# Patient Record
Sex: FEMALE | Race: WHITE | Employment: UNEMPLOYED | URBAN - METROPOLITAN AREA
[De-identification: names, ages, dates, MRNs, and addresses within clinical notes are randomized per-mention and may not be internally consistent; named-entity substitution may affect disease eponyms.]

---

## 2018-03-22 ENCOUNTER — HOSPITAL ENCOUNTER (OUTPATIENT)
Age: 53
Discharge: HOME | End: 2018-03-24
Payer: COMMERCIAL

## 2018-03-22 PROCEDURE — 87075 CULTR BACTERIA EXCEPT BLOOD: CPT

## 2018-03-22 PROCEDURE — 87070 CULTURE OTHR SPECIMN AEROBIC: CPT

## 2018-03-24 LAB
ANAEROBIC CULTURE: NORMAL
ORGANISM: ABNORMAL
WOUND/ABSCESS: ABNORMAL
WOUND/ABSCESS: ABNORMAL

## 2021-04-09 ENCOUNTER — INITIAL CONSULT (OUTPATIENT)
Dept: SURGERY | Age: 56
End: 2021-04-09
Payer: COMMERCIAL

## 2021-04-09 VITALS
TEMPERATURE: 97.9 F | HEART RATE: 57 BPM | WEIGHT: 214 LBS | BODY MASS INDEX: 42.01 KG/M2 | DIASTOLIC BLOOD PRESSURE: 76 MMHG | HEIGHT: 60 IN | SYSTOLIC BLOOD PRESSURE: 137 MMHG

## 2021-04-09 DIAGNOSIS — K59.09 OTHER CONSTIPATION: ICD-10-CM

## 2021-04-09 PROCEDURE — G8419 CALC BMI OUT NRM PARAM NOF/U: HCPCS | Performed by: SURGERY

## 2021-04-09 PROCEDURE — 99244 OFF/OP CNSLTJ NEW/EST MOD 40: CPT | Performed by: SURGERY

## 2021-04-09 PROCEDURE — G8427 DOCREV CUR MEDS BY ELIG CLIN: HCPCS | Performed by: SURGERY

## 2021-04-09 RX ORDER — LISINOPRIL 10 MG/1
TABLET ORAL
COMMUNITY
Start: 2021-04-07

## 2021-04-09 RX ORDER — ATORVASTATIN CALCIUM 40 MG/1
TABLET, FILM COATED ORAL
COMMUNITY
Start: 2021-03-12

## 2021-04-09 RX ORDER — FAMOTIDINE 20 MG/1
TABLET, FILM COATED ORAL
COMMUNITY
Start: 2021-04-06

## 2021-04-09 RX ORDER — OMEPRAZOLE 20 MG/1
CAPSULE, DELAYED RELEASE ORAL
COMMUNITY
Start: 2021-03-23

## 2021-04-09 RX ORDER — SODIUM CHLORIDE 9 MG/ML
INJECTION, SOLUTION INTRAVENOUS CONTINUOUS
Status: CANCELLED | OUTPATIENT
Start: 2021-04-09

## 2021-04-09 RX ORDER — MOMETASONE FUROATE AND FORMOTEROL FUMARATE DIHYDRATE 200; 5 UG/1; UG/1
AEROSOL RESPIRATORY (INHALATION)
COMMUNITY
Start: 2021-03-21

## 2021-04-09 NOTE — PROGRESS NOTES
Ben Gee  4/9/2021      [place] Office Consult    CHIEF COMPLAINT:  Abd pain, nausea    HISTORY OF PRESENT ILLNESS:  Ben Gee is a 63 y.o.  female had a ventral hernia repair by Dr. Darell Siu in 2015, CT scan 2017 for abdominal pain showed the hernia was well repaired, no recurrence, mesh in good position. She says she has chronic constipation pains, worse after eating. Says she throws up frequently, usually after taking pills. Takes Omeprazole daily for gastritis and acid reflux. Past Medical History: She has a past medical history of Arthritis, Carotid stenosis, left, COPD (chronic obstructive pulmonary disease) ([facility] H/O: pneumonia, Hyperlipidemia, Hypertension, Lupus ([facility] [address] Osteoporosis, PONV (postoperative nausea and vomiting), and Sleep apnea. Past Surgical History: She has a past surgical history that includes Hysterectomy; Incisional hernia repair; joint replacement; Tonsillectomy; Cholecystectomy; Colonoscopy; Endoscopy, colon, diagnostic; Cardiac surgery (2009); and hernia repair (2015). Home Medications  Prior to Visit Medications    Medication Sig Taking?  Authorizing Provider   atorvastatin (LIPITOR) 40 MG tablet  Yes Historical Provider, MD   diclofenac sodium (VOLTAREN) 1 % GEL USE AS DIRECTED THREE TIMES A DAY--TRANSDERMAL FOR 14 DAYS Yes Historical Provider, MD   famotidine (PEPCID) 20 MG tablet  Yes Historical Provider, MD   lisinopril (PRINIVIL;ZESTRIL) 10 MG tablet  Yes Historical Provider, MD Sayra Corcoran 200-5 MCG/ACT inhaler  Yes Historical Provider, MD   omeprazole (PRILOSEC) 20 MG delayed release capsule take 1 capsule by mouth once daily Yes Historical Provider, MD   pantoprazole (PROTONIX) 40 MG tablet Take 1 tablet by mouth daily Yes Zara Martinez,    vitamin D (ERGOCALCIFEROL) 72336 UNITS CAPS capsule Take 50,000 Units by mouth once a week Yes Historical Provider, MD   docusate sodium (COLACE) 100 MG capsule Take 100 mg by mouth 2 times daily Yes Historical Constipation, abdominal pain. Had a colonoscopy a few years ago. PLAN:   Fiber gummy 6 per day to prevent constipation pains. Colonoscopy    Signed: Dr. Daniela Sylvester M.D.     Send copy of consult to PCP, Jose Juan Sanders DO

## 2021-04-12 ENCOUNTER — TELEPHONE (OUTPATIENT)
Dept: SURGERY | Age: 56
End: 2021-04-12

## 2021-04-12 NOTE — TELEPHONE ENCOUNTER
Per Dr. Darshana Goddard, patient is scheduled for Colonoscopy at [facility] on 2021. Surgery scheduling form faxed with confirmation, surgeon's calendar updated. Dr. Darshana Goddard to enter orders. Follow up appointment scheduled. Miralax bowel prep instructions provided & discussed. Will check if pre-cert is required. Prior Authorization Form:      DEMOGRAPHICS:                     Patient Name:  Bindu Becerra  Patient :  1965            Insurance:  Payor: Tim Yeh / Plan: Jelena Alberto / Product Type: *No Product type* /   Insurance ID Number:    Payor/Plan Subscr  Sex Relation Sub.  Ins. ID Effective Group Num   1. [facility] 1965 Female Self [id number] 1/1/15 [facility] [address]         DIAGNOSIS & PROCEDURE:                       Procedure/Operation: Colonoscopy            CPT Code: 70487    Diagnosis:  Constipation    ICD10 Code: K59.09    Location:  [facility]    Surgeon:  Dr. Lyn Luis INFORMATION:                          Date: 2021    Time: TBD              Anesthesia:                                                         Status:  Outpatient        Special Comments:         Electronically signed by Conner Carbone on 2021 at 11:30 AM

## 2021-04-20 NOTE — PROGRESS NOTES
[facility] [facility]                                                                                                                    PRE OP INSTRUCTIONS FOR  Ben Soto        Date: 4/20/2021    Date of surgery: 4/21/2021   Arrival Time: Hospital will call you between 5pm and 7pm with your final arrival time for surgery    1. Do not eat or drink anything after 12 prior to surgery. This includes no water, chewing gum, mints or ice chips. 2. Take the following medications with a small sip of water on the morning of Surgery: inhalers, atenolol     3. Diabetics may take evening dose of insulin but none after midnight. If you feel symptomatic or low blood sugar morning of surgery drink 1-2 ounces of apple juice only. 4. Aspirin, Ibuprofen, Advil, Naproxen, Vitamin E and other Anti-inflammatory products should be stopped  before surgery  as directed by your physician. Take Tylenol only unless instructed otherwise by your surgeon. 5. Check with your Doctor regarding stopping Plavix, Coumadin, Lovenox, Eliquis, Effient, or other blood thinners. 6. Do not smoke,use illicit drugs and do not drink any alcoholic beverages 24 hours prior to surgery. 7. You may brush your teeth the morning of surgery. DO NOT SWALLOW WATER    8. You MUST make arrangements for a responsible adult to take you home after your surgery. You will not be allowed to leave alone or drive yourself home. It is strongly suggested someone stay with you the first 24 hrs. Your surgery will be cancelled if you do not have a ride home. 9. PEDIATRIC PATIENTS ONLY:  A parent/legal guardian must accompany a child scheduled for surgery and plan to stay at the hospital until the child is discharged. Please do not bring other children with you.     10. Please wear simple, loose fitting clothing to the hospital.  Peg Jungling not bring valuables (money, credit cards, checkbooks, etc.) Do not wear any makeup (including no eye makeup) or nail polish on your fingers or toes. 11. DO NOT wear any jewelry or piercings on day of surgery. All body piercing jewelry must be removed. 12. Shower the night before surgery with ___Antibacterial soap /KAYE WIPES________    13. TOTAL JOINT REPLACEMENT/HYSTERECTOMY PATIENTS ONLY---Remember to bring Blood Bank bracelet to the hospital on the day of surgery. 14. If you have a Living Will and Durable Power of  for Healthcare, please bring in a copy. 15. If appropriate bring crutches, inspirex, WALKER, CANE etc... 12. Notify your Surgeon if you develop any illness between now and surgery time, cough, cold, fever, sore throat, nausea, vomiting, etc.  Please notify your surgeon if you experience dizziness, shortness of breath or blurred vision between now & the time of your surgery. 17. If you have ___dentures, they will be removed before going to the OR; we will provide you a container. If you wear ___contact lenses or ___glasses, they will be removed; please bring a case for them. 18. To provide excellent care visitors will be limited to 2 in the room at any given time. 19. Please bring picture ID and insurance card. 20. Sleep apnea patients need to bring CPAP AND SETTINGS to hospital on day of surgery. 21. During flu season no children under the age of 15 are permitted in the hospital for the safety of all patients. 22. Other                  Please call AMBULATORY CARE if you have any further questions.    [facility]     [address]

## 2021-04-21 ENCOUNTER — HOSPITAL ENCOUNTER (OUTPATIENT)
Age: 56
Setting detail: OUTPATIENT SURGERY
Discharge: HOME | End: 2021-04-21
Attending: SURGERY | Admitting: SURGERY
Payer: COMMERCIAL

## 2021-04-21 ENCOUNTER — ANESTHESIA (OUTPATIENT)
Dept: ENDOSCOPY | Age: 56
End: 2021-04-21
Payer: COMMERCIAL

## 2021-04-21 ENCOUNTER — ANESTHESIA EVENT (OUTPATIENT)
Dept: ENDOSCOPY | Age: 56
End: 2021-04-21
Payer: COMMERCIAL

## 2021-04-21 VITALS
OXYGEN SATURATION: 96 % | SYSTOLIC BLOOD PRESSURE: 150 MMHG | RESPIRATION RATE: 17 BRPM | DIASTOLIC BLOOD PRESSURE: 85 MMHG

## 2021-04-21 VITALS
HEIGHT: 60 IN | OXYGEN SATURATION: 96 % | WEIGHT: 214 LBS | RESPIRATION RATE: 16 BRPM | TEMPERATURE: 97.8 F | BODY MASS INDEX: 42.01 KG/M2 | SYSTOLIC BLOOD PRESSURE: 114 MMHG | DIASTOLIC BLOOD PRESSURE: 70 MMHG | HEART RATE: 65 BPM

## 2021-04-21 DIAGNOSIS — Z01.818 PREOP TESTING: Primary | ICD-10-CM

## 2021-04-21 PROCEDURE — 7100000011 HC PHASE II RECOVERY - ADDTL 15 MIN: Performed by: SURGERY

## 2021-04-21 PROCEDURE — 3609027000 HC COLONOSCOPY: Performed by: SURGERY

## 2021-04-21 PROCEDURE — 2709999900 HC NON-CHARGEABLE SUPPLY: Performed by: SURGERY

## 2021-04-21 PROCEDURE — 45380 COLONOSCOPY AND BIOPSY: CPT | Performed by: SURGERY

## 2021-04-21 PROCEDURE — 2580000003 HC RX 258: Performed by: SURGERY

## 2021-04-21 PROCEDURE — 3700000001 HC ADD 15 MINUTES (ANESTHESIA): Performed by: SURGERY

## 2021-04-21 PROCEDURE — 6360000002 HC RX W HCPCS: Performed by: NURSE ANESTHETIST, CERTIFIED REGISTERED

## 2021-04-21 PROCEDURE — 7100000010 HC PHASE II RECOVERY - FIRST 15 MIN: Performed by: SURGERY

## 2021-04-21 PROCEDURE — 3700000000 HC ANESTHESIA ATTENDED CARE: Performed by: SURGERY

## 2021-04-21 RX ORDER — PROPOFOL 10 MG/ML
INJECTION, EMULSION INTRAVENOUS CONTINUOUS PRN
Status: DISCONTINUED | OUTPATIENT
Start: 2021-04-21 | End: 2021-04-21 | Stop reason: SDUPTHER

## 2021-04-21 RX ORDER — SODIUM CHLORIDE 9 MG/ML
INJECTION, SOLUTION INTRAVENOUS CONTINUOUS
Status: DISCONTINUED | OUTPATIENT
Start: 2021-04-21 | End: 2021-04-21 | Stop reason: HOSPADM

## 2021-04-21 RX ADMIN — PROPOFOL 120 MCG/KG/MIN: 10 INJECTION, EMULSION INTRAVENOUS at 14:37

## 2021-04-21 RX ADMIN — SODIUM CHLORIDE: 9 INJECTION, SOLUTION INTRAVENOUS at 14:32

## 2021-04-21 ASSESSMENT — PAIN - FUNCTIONAL ASSESSMENT: PAIN_FUNCTIONAL_ASSESSMENT: 0-10

## 2021-04-21 ASSESSMENT — LIFESTYLE VARIABLES: SMOKING_STATUS: 1

## 2021-04-21 NOTE — H&P
Ben Hopkins  4/21/2021      H&P    CHIEF COMPLAINT:  Abd pain, nausea    HISTORY OF PRESENT ILLNESS:  Ben Hopkins is a 63 y.o.  female had a ventral hernia repair by Dr. Jovita Griffin in 2015, CT scan 2017 for abdominal pain showed the hernia was well repaired, no recurrence, mesh in good position. She says she has chronic constipation pains, worse after eating. Says she throws up frequently, usually after taking pills. Takes Omeprazole daily for gastritis and acid reflux. Past Medical History: She has a past medical history of Arthritis, Carotid stenosis, left, COPD (chronic obstructive pulmonary disease) ([facility] [address] H/O: pneumonia, Hyperlipidemia, Hypertension, Lupus ([facility] [address] Osteoporosis, PONV (postoperative nausea and vomiting), and Sleep apnea. Past Surgical History: She has a past surgical history that includes Hysterectomy; Incisional hernia repair; joint replacement; Tonsillectomy; Cholecystectomy; Colonoscopy; Endoscopy, colon, diagnostic; Cardiac surgery (2009); and hernia repair (2015). Home Medications  Prior to Visit Medications    Medication Sig Taking?  Authorizing Provider   atorvastatin (LIPITOR) 40 MG tablet  Yes Historical Provider, MD   diclofenac sodium (VOLTAREN) 1 % GEL USE AS DIRECTED THREE TIMES A DAY--TRANSDERMAL FOR 14 DAYS Yes Historical Provider, MD   famotidine (PEPCID) 20 MG tablet  Yes Historical Provider, MD   lisinopril (PRINIVIL;ZESTRIL) 10 MG tablet  Yes Historical Provider, MD Larala Spark 200-5 MCG/ACT inhaler  Yes Historical Provider, MD   omeprazole (PRILOSEC) 20 MG delayed release capsule take 1 capsule by mouth once daily Yes Historical Provider, MD   pantoprazole (PROTONIX) 40 MG tablet Take 1 tablet by mouth daily Yes Odin Rodriguez,    vitamin D (ERGOCALCIFEROL) 43902 UNITS CAPS capsule Take 50,000 Units by mouth once a week Yes Historical Provider, MD   docusate sodium (COLACE) 100 MG capsule Take 100 mg by mouth 2 times daily Yes Historical Provider, MD budesonide-formoterol (SYMBICORT) 80-4.5 MCG/ACT AERO Inhale 2 puffs into the lungs 2 times daily Yes Historical Provider, MD   atenolol (TENORMIN) 25 MG tablet Take 25 mg by mouth daily. Yes Historical Provider, MD   albuterol (PROVENTIL HFA;VENTOLIN HFA) 108 (90 BASE) MCG/ACT inhaler Inhale 2 puffs into the lungs every 6 hours as needed. Yes Historical Provider, MD   alendronate (FOSAMAX) 70 MG tablet Take 70 mg by mouth every 7 days. Yes Historical Provider, MD   calcium-vitamin D (OSCAL) 250-125 MG-UNIT per tablet Take 1 tablet by mouth daily. Yes Historical Provider, MD       Allergies: Other   Social History:   TOBACCO:   reports that she has quit smoking. She smoked 1.00 pack per day. She quit smokeless tobacco use about 6 years ago. All smokers should join the free smoking cessation program and stop smoking before having surgery. ETOH:    reports no history of alcohol use. Problem Relation Age of Onset    Diabetes Mother     Cancer Father         Bone cancer       Review of Systems:  Psychiatric:  depression and anxiety  Respiratory: negative  Cardiovascular: negative  Gastrointestinal: negative  Musculoskeletal:negative  All others reviewed, negative    Physical Exam:   VITALS: Blood pressure 130/67, pulse 68, temperature 97.8 °F (36.6 °C), temperature source Temporal, resp. rate 16, height 5' (1.524 m), weight 214 lb (97.1 kg), SpO2 97 %, not currently breastfeeding.   General appearance: alert, appears stated age and cooperative, does not ambulate easily  Head: Normocephalic, without obvious abnormality, atraumatic  Eyes: PERRL  Ears/mouth/throat:  Ears clear, mouth normal, throat no redness  Neck: no adenopathy, no JVD, supple, symmetrical, trachea midline and thyroid not enlarged  Lungs: clear to auscultation bilaterally  Heart: regular rate and rhythm  Abdomen: obese, firm, no hernia palpable  Extremities: extremities normal, atraumatic, no cyanosis or edema  Skin: no open

## 2021-04-21 NOTE — ANESTHESIA POSTPROCEDURE EVALUATION
Department of Anesthesiology  Postprocedure Note    Patient: Cande Trujillo  MRN: [id number]  YOB: 1965  Date of evaluation: 4/21/2021  Time:  6:49 PM     Procedure Summary     Date: 04/21/21 Room / Location: [facility] / [facility]    Anesthesia Start: 3480 Anesthesia Stop: 1903    Procedure: COLONOSCOPY **DO NOT CHANGE TIME** TRANSPORTATION (N/A ) Diagnosis: (CONSTIPATION)    Surgeons: Estefany Walker MD Responsible Provider: Wallace Esparza MD    Anesthesia Type: MAC ASA Status: 3          Anesthesia Type: MAC    Narciso Phase I: Narciso Score: 10    Narciso Phase II: Narciso Score: 10    Last vitals: Reviewed and per EMR flowsheets.        Anesthesia Post Evaluation    Patient location during evaluation: bedside  Patient participation: complete - patient participated  Level of consciousness: awake and alert  Pain score: 0  Airway patency: patent  Nausea & Vomiting: no nausea and no vomiting  Complications: no  Cardiovascular status: hemodynamically stable  Respiratory status: acceptable  Hydration status: euvolemic

## 2021-04-21 NOTE — ANESTHESIA PRE PROCEDURE
Department of Anesthesiology  Preprocedure Note       Name:  Ben Real   Age:  63 y.o.  :  1965                                          MRN:  [id number]         Date:  2021      Surgeon: Angela Hebert):  Kay Goltz, MD    Procedure: Procedure(s):  COLONOSCOPY **DO NOT CHANGE TIME** TRANSPORTATION    Medications prior to admission:   Prior to Admission medications    Medication Sig Start Date End Date Taking? Authorizing Provider   atorvastatin (LIPITOR) 40 MG tablet  3/12/21  Yes Historical Provider, MD   diclofenac sodium (VOLTAREN) 1 % GEL USE AS DIRECTED THREE TIMES A DAY--TRANSDERMAL FOR 14 DAYS 3/19/21  Yes Historical Provider, MD   famotidine (PEPCID) 20 MG tablet  21  Yes Historical Provider, MD   lisinopril (PRINIVIL;ZESTRIL) 10 MG tablet  21  Yes Historical Provider, MD Sylvia Quintero 200-5 MCG/ACT inhaler  3/21/21  Yes Historical Provider, MD   omeprazole (PRILOSEC) 20 MG delayed release capsule take 1 capsule by mouth once daily 3/23/21  Yes Historical Provider, MD   pantoprazole (PROTONIX) 40 MG tablet Take 1 tablet by mouth daily 16  Yes Link Plush, DO   vitamin D (ERGOCALCIFEROL) 81122 UNITS CAPS capsule Take 50,000 Units by mouth once a week   Yes Historical Provider, MD   docusate sodium (COLACE) 100 MG capsule Take 100 mg by mouth 2 times daily   Yes Historical Provider, MD   budesonide-formoterol (SYMBICORT) 80-4.5 MCG/ACT AERO Inhale 2 puffs into the lungs 2 times daily   Yes Historical Provider, MD   atenolol (TENORMIN) 25 MG tablet Take 25 mg by mouth daily. Yes Historical Provider, MD   albuterol (PROVENTIL HFA;VENTOLIN HFA) 108 (90 BASE) MCG/ACT inhaler Inhale 2 puffs into the lungs every 6 hours as needed. Yes Historical Provider, MD   alendronate (FOSAMAX) 70 MG tablet Take 70 mg by mouth every 7 days. Yes Historical Provider, MD   calcium-vitamin D (OSCAL) 250-125 MG-UNIT per tablet Take 1 tablet by mouth daily.    Yes Historical Provider, MD       Current History:        Procedure Laterality Date    CARDIAC SURGERY  2009    heart catheterization    CHOLECYSTECTOMY      COLONOSCOPY      ENDOSCOPY, COLON, DIAGNOSTIC      HERNIA REPAIR  2015    HYSTERECTOMY      partial    INCISIONAL HERNIA REPAIR      laparoscopic repair of incisonal hernia with mesh    JOINT REPLACEMENT      right knee    TONSILLECTOMY         Social History:    Social History     Tobacco Use    Smoking status: Former Smoker     Packs/day: 1.00    Smokeless tobacco: Former User     Quit date: 2015   Substance Use Topics    Alcohol use: No                                Counseling given: Not Answered      Vital Signs (Current):   Vitals:    04/20/21 1255   Weight: 214 lb (97.1 kg)   Height: 5' (1.524 m)                                              BP Readings from Last 3 Encounters:   04/09/21 137/76   08/16/16 96/62   10/11/13 128/80       NPO Status:                                                                                 BMI:   Wt Readings from Last 3 Encounters:   04/09/21 214 lb (97.1 kg)   08/16/16 230 lb 2 oz (104.4 kg)   10/11/13 255 lb (115.7 kg)     Body mass index is 41.79 kg/m². CBC:   Lab Results   Component Value Date    WBC 5.3 01/04/2018    RBC 4.57 01/04/2018    HGB 13.4 01/04/2018    HCT 42.0 01/04/2018    MCV 91.9 01/04/2018    RDW 13.9 01/04/2018     01/04/2018       CMP:   Lab Results   Component Value Date     01/04/2018    K 4.5 01/04/2018     01/04/2018    CO2 25 01/04/2018    BUN 17 01/04/2018    CREATININE 0.9 01/04/2018    GFRAA >60 01/04/2018    LABGLOM >60 01/04/2018    GLUCOSE 90 01/04/2018    GLUCOSE 82 11/14/2011    PROT 7.3 01/04/2018    CALCIUM 9.3 01/04/2018    BILITOT 0.5 01/04/2018    ALKPHOS 82 01/04/2018    AST 15 01/04/2018    ALT 14 01/04/2018       POC Tests: No results for input(s): POCGLU, POCNA, POCK, POCCL, POCBUN, POCHEMO, POCHCT in the last 72 hours.     Coags: No results found for: PROTIME, INR, APTT    HCG (If Applicable): No results found for: PREGTESTUR, PREGSERUM, HCG, HCGQUANT     ABGs: No results found for: PHART, PO2ART, JBW2CWR, VFK7EQW, BEART, [id number]     Type & Screen (If Applicable):  No results found for: LABABO, LABRH    Drug/Infectious Status (If Applicable):  No results found for: HIV, HEPCAB    COVID-19 Screening (If Applicable): No results found for: COVID19        Anesthesia Evaluation  Patient summary reviewed   history of anesthetic complications: PONV. Airway: Mallampati: III  TM distance: >3 FB   Neck ROM: full  Mouth opening: > = 3 FB Dental:      Comment: Dentition intact, patient denies any loose teeth. Pulmonary:   (+) pneumonia:  COPD:  sleep apnea:  rhonchi,  decreased breath sounds,  wheezes ( slight wheezing),  current smoker ( 0.5 - 1.5 PPD x 40 years )          Patient smoked on day of surgery. Cardiovascular:  Exercise tolerance: poor (<4 METS),   (+) hypertension:, hyperlipidemia        Rhythm: regular  Rate: normal                    Neuro/Psych:   Negative Neuro/Psych ROS              GI/Hepatic/Renal:   (+) morbid obesity          Endo/Other:    (+) : arthritis: OA., .                 Abdominal:   (+) obese ( Super morbid obesity),         Vascular: negative vascular ROS.  + PVD, aortic or cerebral ( Carotid stenosis), . Anesthesia Plan      MAC     ASA 3       Induction: intravenous. Anesthetic plan and risks discussed with patient. Plan discussed with CRNA.                   Edita David MD   4/21/2021

## 2021-04-21 NOTE — OP NOTE
Ben Zimmerman    Operative Report    DATE OF PROCEDURE: 4/21/2021    SURGEON: Dr. Farhad Farias: none    PREOPERATIVE DIAGNOSES:   Constipation    POSTOPERATIVE DIAGNOSES:   Colonoscopy 4/21/21 showed large, soft external hemorrhoids and extensive diverticula from the rectum to the cecum. No polyps or tumors. OPERATION:   Colonoscopy to the cecum    ANESTHESIA: LMAC. BLOOD LOSS: none  SPECIMEN: none    History and consent: This is a 63y.o. year old female with constipation pains. I have discussed with the patient the indication, alternatives, and the possible risks and/or complications of the colonoscopy and the anesthesia. The patient appears to understand and agrees to proceed. Mag Citrate was given two days ago to start the bowels moving, then a Myralax bowel prep was done yesterday until the bowels were clear. Monitoring and Safety:    The patient was placed on a cardiac monitor and vital signs, pulse oximetry and level of consciousness were continuously evaluated throughout the procedure. The patient was closely monitored until recovery from the medications was complete and the patient had returned to baseline status. Anaesthesia was present at all times during the procedure. OPERATIONS:  The patient was placed on the table and sedated by anaesthesia. An anal exam was done, large, soft external hemorrhoidal complexes were present, no thrombosis or bleeding. The lubricated scope was passed into the rectum and retroflexed which showed small internal hemorrhoids. The scope was passed up through the sigmoid which had extensive large diverticula. The scope was pass all the way around to the cecum where diverticula present everywhere. The scope was slowly withdrawn, each area was examined again on the way out. No polyps were seen. The scope was removed. The patient tolerated the procedure well.      Complications: none    Plan:  Keep the bowels soft with fiber. Will need a repeat colonoscopy in 10 years.       Electronically signed by Dr. Fran Alonso M.D.

## 2021-05-07 ENCOUNTER — OFFICE VISIT (OUTPATIENT)
Dept: SURGERY | Age: 56
End: 2021-05-07
Payer: COMMERCIAL

## 2021-05-07 VITALS
WEIGHT: 214 LBS | BODY MASS INDEX: 42.01 KG/M2 | DIASTOLIC BLOOD PRESSURE: 69 MMHG | HEIGHT: 60 IN | TEMPERATURE: 98.3 F | SYSTOLIC BLOOD PRESSURE: 119 MMHG | HEART RATE: 63 BPM

## 2021-05-07 DIAGNOSIS — K59.04 CHRONIC IDIOPATHIC CONSTIPATION: Primary | ICD-10-CM

## 2021-05-07 PROCEDURE — G8417 CALC BMI ABV UP PARAM F/U: HCPCS | Performed by: SURGERY

## 2021-05-07 PROCEDURE — 3017F COLORECTAL CA SCREEN DOC REV: CPT | Performed by: SURGERY

## 2021-05-07 PROCEDURE — G8427 DOCREV CUR MEDS BY ELIG CLIN: HCPCS | Performed by: SURGERY

## 2021-05-07 PROCEDURE — 1036F TOBACCO NON-USER: CPT | Performed by: SURGERY

## 2021-05-07 PROCEDURE — 99213 OFFICE O/P EST LOW 20 MIN: CPT | Performed by: SURGERY

## 2021-05-07 NOTE — PROGRESS NOTES
Ben Stafford  5/7/2021      [facility] Office visit    Ben Stafford is a 63 y.o.  female post Colonoscopy 4/21/21 which showed large, soft external hemorrhoids and extensive diverticula from the rectum to the cecum. No polyps or tumors. History:  ventral hernia repair by Dr. General Lockwood in 2015, CT scan 2017 for abdominal pain showed the hernia was well repaired, no recurrence, mesh in good position. She says she has chronic constipation pains, worse after eating. Says she throws up frequently, usually after taking pills. Takes Omeprazole daily for gastritis and acid reflux. Past Medical History: She has a past medical history of Arthritis, Carotid stenosis, left, COPD (chronic obstructive pulmonary disease) ([facility] [address] H/O: pneumonia, Hyperlipidemia, Hypertension, Lupus ([facility] [address] Osteoporosis, PONV (postoperative nausea and vomiting), and Sleep apnea. Past Surgical History: She has a past surgical history that includes Hysterectomy; Incisional hernia repair; joint replacement; Tonsillectomy; Cholecystectomy; Colonoscopy; Endoscopy, colon, diagnostic; Cardiac surgery (2009); hernia repair (2015); and Colonoscopy (N/A, 4/21/2021). Home Medications  Prior to Visit Medications    Medication Sig Taking?  Authorizing Provider   atorvastatin (LIPITOR) 40 MG tablet  Yes Historical Provider, MD   diclofenac sodium (VOLTAREN) 1 % GEL USE AS DIRECTED THREE TIMES A DAY--TRANSDERMAL FOR 14 DAYS Yes Historical Provider, MD   famotidine (PEPCID) 20 MG tablet  Yes Historical Provider, MD   lisinopril (PRINIVIL;ZESTRIL) 10 MG tablet  Yes Historical Provider, MD Yuly Arenas 52 200-5 MCG/ACT inhaler  Yes Historical Provider, MD   omeprazole (PRILOSEC) 20 MG delayed release capsule take 1 capsule by mouth once daily Yes Historical Provider, MD   pantoprazole (PROTONIX) 40 MG tablet Take 1 tablet by mouth daily Yes Finesse Parents, DO   vitamin D (ERGOCALCIFEROL) 63684 UNITS CAPS capsule Take 50,000 Units by mouth once a week Yes Historical Provider, MD   docusate sodium (COLACE) 100 MG capsule Take 100 mg by mouth 2 times daily Yes Historical Provider, MD   budesonide-formoterol (SYMBICORT) 80-4.5 MCG/ACT AERO Inhale 2 puffs into the lungs 2 times daily Yes Historical Provider, MD   atenolol (TENORMIN) 25 MG tablet Take 25 mg by mouth daily. Yes Historical Provider, MD   albuterol (PROVENTIL HFA;VENTOLIN HFA) 108 (90 BASE) MCG/ACT inhaler Inhale 2 puffs into the lungs every 6 hours as needed. Yes Historical Provider, MD   alendronate (FOSAMAX) 70 MG tablet Take 70 mg by mouth every 7 days. Yes Historical Provider, MD   calcium-vitamin D (OSCAL) 250-125 MG-UNIT per tablet Take 1 tablet by mouth daily. Yes Historical Provider, MD       Allergies: Other   Social History:   TOBACCO:   reports that she has quit smoking. She smoked 1.00 pack per day. She quit smokeless tobacco use about 6 years ago. All smokers should join the free smoking cessation program and stop smoking before having surgery. ETOH:    reports no history of alcohol use. Problem Relation Age of Onset    Diabetes Mother     Cancer Father         Bone cancer       Review of Systems:  Psychiatric:  depression and anxiety  Respiratory: negative  Cardiovascular: negative  Gastrointestinal: negative  Musculoskeletal:negative  All others reviewed, negative    Physical Exam:   VITALS: Blood pressure 119/69, pulse 63, temperature 98.3 °F (36.8 °C), temperature source Temporal, height 5' (1.524 m), weight 214 lb (97.1 kg), not currently breastfeeding.   General appearance: alert, appears stated age and cooperative, does not ambulate easily  Head: Normocephalic, without obvious abnormality, atraumatic  Eyes: PERRL  Ears/mouth/throat:  Ears clear, mouth normal, throat no redness  Neck: no adenopathy, no JVD, supple, symmetrical, trachea midline and thyroid not enlarged  Lungs: clear to auscultation bilaterally  Heart: regular rate and rhythm  Abdomen: obese, firm, no hernia palpable  Extremities: extremities normal, atraumatic, no cyanosis or edema  Skin: no open wounds    ASSESSMENT:   Diverticulosis from cecum to rectum, no infection. Chronic constipation and gas causing abdominal pain. No hernias. PLAN:   Fiber gummy 6 per day and Myralax to prevent constipation pains, keep the bowels soft and moving daily. Signed: Dr. Zarina Mederos M.D.     Send copy of consult to PCP, Adam Carmona DO

## 2023-12-14 ENCOUNTER — HOSPITAL ENCOUNTER (OUTPATIENT)
Dept: RADIOLOGY | Facility: HOSPITAL | Age: 58
Discharge: HOME | End: 2023-12-14
Payer: COMMERCIAL

## 2023-12-14 DIAGNOSIS — Z96.651 PRESENCE OF RIGHT ARTIFICIAL KNEE JOINT: ICD-10-CM

## 2023-12-14 DIAGNOSIS — M17.11 UNILATERAL PRIMARY OSTEOARTHRITIS, RIGHT KNEE: ICD-10-CM

## 2023-12-14 DIAGNOSIS — M25.561 PAIN IN RIGHT KNEE: ICD-10-CM

## 2023-12-14 PROCEDURE — 73564 X-RAY EXAM KNEE 4 OR MORE: CPT | Mod: RIGHT SIDE | Performed by: RADIOLOGY

## 2023-12-14 PROCEDURE — 73564 X-RAY EXAM KNEE 4 OR MORE: CPT | Mod: RT

## 2024-03-06 ENCOUNTER — APPOINTMENT (OUTPATIENT)
Dept: RADIOLOGY | Facility: HOSPITAL | Age: 59
End: 2024-03-06
Payer: COMMERCIAL

## 2024-03-06 ENCOUNTER — HOSPITAL ENCOUNTER (EMERGENCY)
Facility: HOSPITAL | Age: 59
Discharge: HOME | End: 2024-03-06
Attending: EMERGENCY MEDICINE
Payer: COMMERCIAL

## 2024-03-06 ENCOUNTER — HOSPITAL ENCOUNTER (OUTPATIENT)
Dept: CARDIOLOGY | Facility: HOSPITAL | Age: 59
Discharge: HOME | End: 2024-03-06
Payer: COMMERCIAL

## 2024-03-06 VITALS
HEIGHT: 60 IN | TEMPERATURE: 98 F | HEART RATE: 93 BPM | DIASTOLIC BLOOD PRESSURE: 65 MMHG | OXYGEN SATURATION: 96 % | SYSTOLIC BLOOD PRESSURE: 118 MMHG | RESPIRATION RATE: 16 BRPM | WEIGHT: 216 LBS | BODY MASS INDEX: 42.41 KG/M2

## 2024-03-06 DIAGNOSIS — R10.31 RIGHT LOWER QUADRANT ABDOMINAL PAIN: Primary | ICD-10-CM

## 2024-03-06 DIAGNOSIS — J18.9 PNEUMONIA OF RIGHT LUNG DUE TO INFECTIOUS ORGANISM, UNSPECIFIED PART OF LUNG: ICD-10-CM

## 2024-03-06 LAB
ALBUMIN SERPL BCP-MCNC: 4.4 G/DL (ref 3.4–5)
ALP SERPL-CCNC: 61 U/L (ref 33–110)
ALT SERPL W P-5'-P-CCNC: 19 U/L (ref 7–45)
ANION GAP SERPL CALC-SCNC: 16 MMOL/L (ref 10–20)
AST SERPL W P-5'-P-CCNC: 13 U/L (ref 9–39)
BILIRUB SERPL-MCNC: 0.7 MG/DL (ref 0–1.2)
BUN SERPL-MCNC: 20 MG/DL (ref 6–23)
CALCIUM SERPL-MCNC: 9.3 MG/DL (ref 8.6–10.3)
CHLORIDE SERPL-SCNC: 103 MMOL/L (ref 98–107)
CO2 SERPL-SCNC: 23 MMOL/L (ref 21–32)
CREAT SERPL-MCNC: 0.8 MG/DL (ref 0.5–1.05)
CRP SERPL-MCNC: 0.67 MG/DL
EGFRCR SERPLBLD CKD-EPI 2021: 85 ML/MIN/1.73M*2
ERYTHROCYTE [DISTWIDTH] IN BLOOD BY AUTOMATED COUNT: 14.6 % (ref 11.5–14.5)
ERYTHROCYTE [SEDIMENTATION RATE] IN BLOOD BY WESTERGREN METHOD: 19 MM/H (ref 0–30)
GLUCOSE SERPL-MCNC: 143 MG/DL (ref 74–99)
HCT VFR BLD AUTO: 49.4 % (ref 36–46)
HGB BLD-MCNC: 15.9 G/DL (ref 12–16)
MCH RBC QN AUTO: 31 PG (ref 26–34)
MCHC RBC AUTO-ENTMCNC: 32.2 G/DL (ref 32–36)
MCV RBC AUTO: 96 FL (ref 80–100)
NRBC BLD-RTO: 0 /100 WBCS (ref 0–0)
PLATELET # BLD AUTO: 171 X10*3/UL (ref 150–450)
POTASSIUM SERPL-SCNC: 3.9 MMOL/L (ref 3.5–5.3)
PROT SERPL-MCNC: 7.6 G/DL (ref 6.4–8.2)
RBC # BLD AUTO: 5.13 X10*6/UL (ref 4–5.2)
SODIUM SERPL-SCNC: 138 MMOL/L (ref 136–145)
WBC # BLD AUTO: 12.5 X10*3/UL (ref 4.4–11.3)

## 2024-03-06 PROCEDURE — 2500000004 HC RX 250 GENERAL PHARMACY W/ HCPCS (ALT 636 FOR OP/ED): Mod: SE

## 2024-03-06 PROCEDURE — 96375 TX/PRO/DX INJ NEW DRUG ADDON: CPT | Mod: 59

## 2024-03-06 PROCEDURE — 2500000004 HC RX 250 GENERAL PHARMACY W/ HCPCS (ALT 636 FOR OP/ED): Mod: SE | Performed by: EMERGENCY MEDICINE

## 2024-03-06 PROCEDURE — 85652 RBC SED RATE AUTOMATED: CPT | Performed by: EMERGENCY MEDICINE

## 2024-03-06 PROCEDURE — 2550000001 HC RX 255 CONTRASTS: Mod: SE | Performed by: EMERGENCY MEDICINE

## 2024-03-06 PROCEDURE — 96376 TX/PRO/DX INJ SAME DRUG ADON: CPT | Mod: 59

## 2024-03-06 PROCEDURE — 74177 CT ABD & PELVIS W/CONTRAST: CPT

## 2024-03-06 PROCEDURE — 93005 ELECTROCARDIOGRAM TRACING: CPT

## 2024-03-06 PROCEDURE — 71260 CT THORAX DX C+: CPT | Performed by: RADIOLOGY

## 2024-03-06 PROCEDURE — 96365 THER/PROPH/DIAG IV INF INIT: CPT | Mod: 59

## 2024-03-06 PROCEDURE — 85027 COMPLETE CBC AUTOMATED: CPT | Performed by: EMERGENCY MEDICINE

## 2024-03-06 PROCEDURE — 74177 CT ABD & PELVIS W/CONTRAST: CPT | Performed by: RADIOLOGY

## 2024-03-06 PROCEDURE — 86140 C-REACTIVE PROTEIN: CPT | Performed by: EMERGENCY MEDICINE

## 2024-03-06 PROCEDURE — 36415 COLL VENOUS BLD VENIPUNCTURE: CPT | Performed by: EMERGENCY MEDICINE

## 2024-03-06 PROCEDURE — 99285 EMERGENCY DEPT VISIT HI MDM: CPT | Mod: 25

## 2024-03-06 PROCEDURE — 2500000002 HC RX 250 W HCPCS SELF ADMINISTERED DRUGS (ALT 637 FOR MEDICARE OP, ALT 636 FOR OP/ED): Mod: SE | Performed by: EMERGENCY MEDICINE

## 2024-03-06 PROCEDURE — 80053 COMPREHEN METABOLIC PANEL: CPT | Performed by: EMERGENCY MEDICINE

## 2024-03-06 RX ORDER — ONDANSETRON 4 MG/1
4 TABLET, ORALLY DISINTEGRATING ORAL EVERY 8 HOURS PRN
Qty: 30 TABLET | Refills: 0 | Status: SHIPPED | OUTPATIENT
Start: 2024-03-06

## 2024-03-06 RX ORDER — ONDANSETRON HYDROCHLORIDE 2 MG/ML
4 INJECTION, SOLUTION INTRAVENOUS ONCE
Status: COMPLETED | OUTPATIENT
Start: 2024-03-06 | End: 2024-03-06

## 2024-03-06 RX ORDER — AMOXICILLIN AND CLAVULANATE POTASSIUM 875; 125 MG/1; MG/1
1 TABLET, FILM COATED ORAL EVERY 12 HOURS
Qty: 20 TABLET | Refills: 0 | Status: SHIPPED | OUTPATIENT
Start: 2024-03-06 | End: 2024-03-16

## 2024-03-06 RX ORDER — ONDANSETRON HYDROCHLORIDE 2 MG/ML
INJECTION, SOLUTION INTRAVENOUS
Status: COMPLETED
Start: 2024-03-06 | End: 2024-03-06

## 2024-03-06 RX ORDER — CEFTRIAXONE 1 G/50ML
1 INJECTION, SOLUTION INTRAVENOUS ONCE
Status: COMPLETED | OUTPATIENT
Start: 2024-03-06 | End: 2024-03-06

## 2024-03-06 RX ORDER — AZITHROMYCIN 250 MG/1
500 TABLET, FILM COATED ORAL ONCE
Status: COMPLETED | OUTPATIENT
Start: 2024-03-06 | End: 2024-03-06

## 2024-03-06 RX ORDER — KETOROLAC TROMETHAMINE 10 MG/1
10 TABLET, FILM COATED ORAL EVERY 6 HOURS PRN
Qty: 20 TABLET | Refills: 0 | Status: SHIPPED | OUTPATIENT
Start: 2024-03-06 | End: 2024-03-11

## 2024-03-06 RX ORDER — KETOROLAC TROMETHAMINE 30 MG/ML
30 INJECTION, SOLUTION INTRAMUSCULAR; INTRAVENOUS ONCE
Status: COMPLETED | OUTPATIENT
Start: 2024-03-06 | End: 2024-03-06

## 2024-03-06 RX ADMIN — CEFTRIAXONE 1 G: 1 INJECTION, SOLUTION INTRAVENOUS at 04:23

## 2024-03-06 RX ADMIN — KETOROLAC TROMETHAMINE 30 MG: 30 INJECTION, SOLUTION INTRAMUSCULAR; INTRAVENOUS at 02:12

## 2024-03-06 RX ADMIN — ONDANSETRON 4 MG: 2 INJECTION INTRAMUSCULAR; INTRAVENOUS at 02:12

## 2024-03-06 RX ADMIN — AZITHROMYCIN DIHYDRATE 500 MG: 250 TABLET, FILM COATED ORAL at 04:23

## 2024-03-06 RX ADMIN — ONDANSETRON HYDROCHLORIDE 4 MG: 2 INJECTION, SOLUTION INTRAVENOUS at 05:22

## 2024-03-06 RX ADMIN — ONDANSETRON 4 MG: 2 INJECTION INTRAMUSCULAR; INTRAVENOUS at 05:22

## 2024-03-06 RX ADMIN — IOHEXOL 75 ML: 350 INJECTION, SOLUTION INTRAVENOUS at 03:38

## 2024-03-06 ASSESSMENT — PAIN DESCRIPTION - DESCRIPTORS
DESCRIPTORS: ACHING
DESCRIPTORS: ACHING

## 2024-03-06 ASSESSMENT — PAIN DESCRIPTION - ORIENTATION
ORIENTATION: RIGHT
ORIENTATION: RIGHT

## 2024-03-06 ASSESSMENT — COLUMBIA-SUICIDE SEVERITY RATING SCALE - C-SSRS
6. HAVE YOU EVER DONE ANYTHING, STARTED TO DO ANYTHING, OR PREPARED TO DO ANYTHING TO END YOUR LIFE?: NO
1. IN THE PAST MONTH, HAVE YOU WISHED YOU WERE DEAD OR WISHED YOU COULD GO TO SLEEP AND NOT WAKE UP?: NO
2. HAVE YOU ACTUALLY HAD ANY THOUGHTS OF KILLING YOURSELF?: NO

## 2024-03-06 ASSESSMENT — PAIN - FUNCTIONAL ASSESSMENT
PAIN_FUNCTIONAL_ASSESSMENT: 0-10
PAIN_FUNCTIONAL_ASSESSMENT: 0-10

## 2024-03-06 ASSESSMENT — PAIN DESCRIPTION - PAIN TYPE: TYPE: ACUTE PAIN

## 2024-03-06 ASSESSMENT — PAIN DESCRIPTION - LOCATION
LOCATION: ABDOMEN
LOCATION: ABDOMEN

## 2024-03-06 ASSESSMENT — PAIN SCALES - GENERAL
PAINLEVEL_OUTOF10: 8
PAINLEVEL_OUTOF10: 10 - WORST POSSIBLE PAIN

## 2024-03-06 NOTE — ED PROVIDER NOTES
HPI   Chief Complaint   Patient presents with    Abdominal Pain     Pt states she has had abdominal pain for 2-3 days, nausea and vomiting, denies any diarrhea. Pt states she has a history of hernias.        Patient is tearful on exam.  She says that she has had abdominal pain for 3 days and tonight she started vomiting.  She denies any diarrhea frequency urgency or dysuria.  No recent illness.  Her abdomen is hurting her in the suprapubic area in the right lower quadrant.  She has no gallbladder but still has her appendix.  She has had 8 hernia surgery on her abdomen.                        Jany Coma Scale Score: 15                     Patient History   No past medical history on file.  No past surgical history on file.  No family history on file.  Social History     Tobacco Use    Smoking status: Not on file    Smokeless tobacco: Not on file   Substance Use Topics    Alcohol use: Not on file    Drug use: Not on file       Physical Exam   ED Triage Vitals [03/06/24 0159]   Temperature Heart Rate Respirations BP   36.7 °C (98 °F) 93 16 122/52      Pulse Ox Temp Source Heart Rate Source Patient Position   98 % Temporal Monitor Sitting      BP Location FiO2 (%)     Right arm --       Physical Exam  Vitals and nursing note reviewed.   HENT:      Head: Normocephalic and atraumatic.      Right Ear: Tympanic membrane and ear canal normal.      Left Ear: Tympanic membrane and ear canal normal.      Nose: Nose normal.      Mouth/Throat:      Mouth: Mucous membranes are moist.   Cardiovascular:      Rate and Rhythm: Normal rate.   Pulmonary:      Effort: Pulmonary effort is normal.      Breath sounds: Normal breath sounds.   Abdominal:      General: Abdomen is flat.      Palpations: Abdomen is soft.   Musculoskeletal:         General: Normal range of motion.      Cervical back: Normal range of motion.   Skin:     General: Skin is warm and dry.   Neurological:      General: No focal deficit present.      Mental Status: She  is alert.   Psychiatric:      Comments: Tearful         ED Course & MDM   ED Course as of 03/06/24 0501   Wed Mar 06, 2024   0204 EKG interpreted by me: Sinus rhythm with rate of 86.  Intervals and axes are normal. [KW]      ED Course User Index  [KW] Dung Carter MD         Diagnoses as of 03/06/24 0501   Right lower quadrant abdominal pain   Pneumonia of right lung due to infectious organism, unspecified part of lung       Medical Decision Making  We worked the patient up with a battery of lab test as well as scans of her chest abdomen and pelvis.  There was no intra-abdominal pathology but CT chest does demonstrate a right upper lobe infiltrate that looks to be pneumonia.  We have started her on ceftriaxone and first dose of Zithromax.  She should follow-up with her doctor in the next 2 to 3 days if not improving.        Procedure  Procedures     Dung Carter MD  03/06/24 0501

## 2024-03-10 LAB
ATRIAL RATE: 86 BPM
P AXIS: 67 DEGREES
P OFFSET: 189 MS
P ONSET: 137 MS
PR INTERVAL: 168 MS
Q ONSET: 221 MS
QRS COUNT: 14 BEATS
QRS DURATION: 74 MS
QT INTERVAL: 358 MS
QTC CALCULATION(BAZETT): 428 MS
QTC FREDERICIA: 403 MS
R AXIS: 55 DEGREES
T AXIS: -65 DEGREES
T OFFSET: 400 MS
VENTRICULAR RATE: 86 BPM

## 2024-03-15 ENCOUNTER — OFFICE VISIT (OUTPATIENT)
Dept: SURGERY | Age: 59
End: 2024-03-15

## 2024-03-15 ENCOUNTER — TELEPHONE (OUTPATIENT)
Dept: SURGERY | Age: 59
End: 2024-03-15

## 2024-03-15 VITALS
WEIGHT: 214 LBS | HEIGHT: 60 IN | SYSTOLIC BLOOD PRESSURE: 142 MMHG | HEART RATE: 65 BPM | BODY MASS INDEX: 42.01 KG/M2 | DIASTOLIC BLOOD PRESSURE: 89 MMHG | TEMPERATURE: 97.7 F

## 2024-03-15 DIAGNOSIS — K59.09 OTHER CONSTIPATION: Primary | ICD-10-CM

## 2024-03-15 NOTE — PROGRESS NOTES
Ben Manzanares  3/15/2024      [place] Office visit    Ben Manzanares is a 58 y.o.  female with chronic abdominal pain, CT scan in [place], CT scan showed diverticulosis and constipation, no hernias, no infections. It also showed possible pneumonia.  Colonoscopy 4/21/21 showed large, soft external hemorrhoids and extensive diverticula from the rectum to the cecum. No polyps or tumors.    History:  ventral hernia repair by Dr. Valle in 2015, CT scan 2017 for abdominal pain showed the hernia was well repaired, no recurrence, mesh in good position. She says she has chronic constipation pains, worse after eating. Says she throws up frequently, usually after taking pills. Takes Omeprazole daily for gastritis and acid reflux.     Past Medical History: She has a past medical history of Arthritis, Carotid stenosis, left, COPD (chronic obstructive pulmonary disease) ([facility]ly [facility]), H/O: pneumonia, Hyperlipidemia, Hypertension, Lupus (Former[facility]), Osteoporosis, PONV (postoperative nausea and vomiting), and Sleep apnea.     Past Surgical History: She has a past surgical history that includes Hysterectomy; Incisional hernia repair; joint replacement; Tonsillectomy; Cholecystectomy; Colonoscopy; Endoscopy, colon, diagnostic; Cardiac surgery (2009); hernia repair (2015); and Colonoscopy (N/A, 4/21/2021).     Home Medications  Prior to Visit Medications    Medication Sig Taking? Authorizing Provider   atorvastatin (LIPITOR) 40 MG tablet   Demario Isabel MD   diclofenac sodium (VOLTAREN) 1 % GEL USE AS DIRECTED THREE TIMES A DAY--TRANSDERMAL FOR 14 DAYS  Demario Isabel MD   famotidine (PEPCID) 20 MG tablet   Demario Isabel MD   lisinopril (PRINIVIL;ZESTRIL) 10 MG tablet   Demario Isabel MD   DULERA 200-5 MCG/ACT inhaler   Demario Isabel MD   omeprazole (PRILOSEC) 20 MG delayed release capsule take 1 capsule by mouth once daily  Demario Isabel MD   pantoprazole (PROTONIX) 40 MG tablet Take 1  Detail Level: Generalized Detail Level: Detailed

## 2024-03-15 NOTE — PATIENT INSTRUCTIONS
Fiber gummy 6 per day and Myralax to prevent constipation pains, keep the bowels soft and moving daily.

## 2024-03-15 NOTE — TELEPHONE ENCOUNTER
Patient to call the office to schedule 2 year follow up with Dr. Solis.   Office contact information provided.  Electronically signed by Paz Villa RN on 3/15/2024 at 10:28 AM

## 2024-09-13 ENCOUNTER — APPOINTMENT (OUTPATIENT)
Dept: RADIOLOGY | Facility: HOSPITAL | Age: 59
End: 2024-09-13
Payer: COMMERCIAL

## 2024-09-13 ENCOUNTER — HOSPITAL ENCOUNTER (INPATIENT)
Facility: HOSPITAL | Age: 59
LOS: 2 days | Discharge: HOME HEALTH | End: 2024-09-15
Attending: EMERGENCY MEDICINE | Admitting: STUDENT IN AN ORGANIZED HEALTH CARE EDUCATION/TRAINING PROGRAM
Payer: COMMERCIAL

## 2024-09-13 DIAGNOSIS — R53.1 GENERALIZED WEAKNESS: ICD-10-CM

## 2024-09-13 DIAGNOSIS — M25.552 PAIN OF LEFT HIP: Primary | ICD-10-CM

## 2024-09-13 DIAGNOSIS — M54.50 ACUTE LEFT-SIDED LOW BACK PAIN WITHOUT SCIATICA: ICD-10-CM

## 2024-09-13 LAB
ALBUMIN SERPL BCP-MCNC: 4.2 G/DL (ref 3.4–5)
ALP SERPL-CCNC: 56 U/L (ref 33–136)
ALT SERPL W P-5'-P-CCNC: 35 U/L (ref 7–45)
ANION GAP SERPL CALC-SCNC: 11 MMOL/L (ref 10–20)
APPEARANCE UR: ABNORMAL
AST SERPL W P-5'-P-CCNC: 18 U/L (ref 9–39)
BACTERIA #/AREA URNS AUTO: ABNORMAL /HPF
BASOPHILS # BLD AUTO: 0.04 X10*3/UL (ref 0–0.1)
BASOPHILS NFR BLD AUTO: 0.5 %
BILIRUB SERPL-MCNC: 0.8 MG/DL (ref 0–1.2)
BILIRUB UR STRIP.AUTO-MCNC: NEGATIVE MG/DL
BUN SERPL-MCNC: 16 MG/DL (ref 6–23)
CALCIUM SERPL-MCNC: 9.2 MG/DL (ref 8.6–10.3)
CHLORIDE SERPL-SCNC: 106 MMOL/L (ref 98–107)
CO2 SERPL-SCNC: 26 MMOL/L (ref 21–32)
COLOR UR: ABNORMAL
CREAT SERPL-MCNC: 0.72 MG/DL (ref 0.5–1.05)
CRP SERPL-MCNC: 0.63 MG/DL
EGFRCR SERPLBLD CKD-EPI 2021: >90 ML/MIN/1.73M*2
EOSINOPHIL # BLD AUTO: 0.26 X10*3/UL (ref 0–0.7)
EOSINOPHIL NFR BLD AUTO: 3.5 %
ERYTHROCYTE [DISTWIDTH] IN BLOOD BY AUTOMATED COUNT: 13.5 % (ref 11.5–14.5)
ERYTHROCYTE [SEDIMENTATION RATE] IN BLOOD BY WESTERGREN METHOD: 16 MM/H (ref 0–30)
GLUCOSE SERPL-MCNC: 93 MG/DL (ref 74–99)
GLUCOSE UR STRIP.AUTO-MCNC: NEGATIVE MG/DL
HCT VFR BLD AUTO: 41.9 % (ref 36–46)
HGB BLD-MCNC: 14.1 G/DL (ref 12–16)
HOLD SPECIMEN: NORMAL
IMM GRANULOCYTES # BLD AUTO: 0.02 X10*3/UL (ref 0–0.7)
IMM GRANULOCYTES NFR BLD AUTO: 0.3 % (ref 0–0.9)
KETONES UR STRIP.AUTO-MCNC: NEGATIVE MG/DL
LEUKOCYTE ESTERASE UR QL STRIP.AUTO: NEGATIVE
LYMPHOCYTES # BLD AUTO: 1.44 X10*3/UL (ref 1.2–4.8)
LYMPHOCYTES NFR BLD AUTO: 19.4 %
MCH RBC QN AUTO: 31.5 PG (ref 26–34)
MCHC RBC AUTO-ENTMCNC: 33.7 G/DL (ref 32–36)
MCV RBC AUTO: 94 FL (ref 80–100)
MONOCYTES # BLD AUTO: 0.54 X10*3/UL (ref 0.1–1)
MONOCYTES NFR BLD AUTO: 7.3 %
NEUTROPHILS # BLD AUTO: 5.13 X10*3/UL (ref 1.2–7.7)
NEUTROPHILS NFR BLD AUTO: 69 %
NITRITE UR QL STRIP.AUTO: NEGATIVE
NRBC BLD-RTO: 0 /100 WBCS (ref 0–0)
PH UR STRIP.AUTO: 7 [PH]
PLATELET # BLD AUTO: 142 X10*3/UL (ref 150–450)
POTASSIUM SERPL-SCNC: 4.2 MMOL/L (ref 3.5–5.3)
PROT SERPL-MCNC: 7 G/DL (ref 6.4–8.2)
PROT UR STRIP.AUTO-MCNC: NEGATIVE MG/DL
RBC # BLD AUTO: 4.48 X10*6/UL (ref 4–5.2)
RBC # UR STRIP.AUTO: ABNORMAL /UL
RBC #/AREA URNS AUTO: ABNORMAL /HPF
SODIUM SERPL-SCNC: 139 MMOL/L (ref 136–145)
SP GR UR STRIP.AUTO: 1
SQUAMOUS #/AREA URNS AUTO: ABNORMAL /HPF
UROBILINOGEN UR STRIP.AUTO-MCNC: <2 MG/DL
WBC # BLD AUTO: 7.4 X10*3/UL (ref 4.4–11.3)
WBC #/AREA URNS AUTO: ABNORMAL /HPF

## 2024-09-13 PROCEDURE — G0378 HOSPITAL OBSERVATION PER HR: HCPCS

## 2024-09-13 PROCEDURE — 2500000001 HC RX 250 WO HCPCS SELF ADMINISTERED DRUGS (ALT 637 FOR MEDICARE OP): Mod: IPSPLIT | Performed by: STUDENT IN AN ORGANIZED HEALTH CARE EDUCATION/TRAINING PROGRAM

## 2024-09-13 PROCEDURE — 85652 RBC SED RATE AUTOMATED: CPT | Performed by: EMERGENCY MEDICINE

## 2024-09-13 PROCEDURE — 86140 C-REACTIVE PROTEIN: CPT | Performed by: EMERGENCY MEDICINE

## 2024-09-13 PROCEDURE — 80053 COMPREHEN METABOLIC PANEL: CPT | Performed by: EMERGENCY MEDICINE

## 2024-09-13 PROCEDURE — 36415 COLL VENOUS BLD VENIPUNCTURE: CPT | Performed by: EMERGENCY MEDICINE

## 2024-09-13 PROCEDURE — 1210000001 HC SEMI-PRIVATE ROOM DAILY: Mod: IPSPLIT

## 2024-09-13 PROCEDURE — 9420000001 HC RT PATIENT EDUCATION 5 MIN: Mod: IPSPLIT

## 2024-09-13 PROCEDURE — 85025 COMPLETE CBC W/AUTO DIFF WBC: CPT | Performed by: EMERGENCY MEDICINE

## 2024-09-13 PROCEDURE — 99285 EMERGENCY DEPT VISIT HI MDM: CPT

## 2024-09-13 PROCEDURE — 72131 CT LUMBAR SPINE W/O DYE: CPT

## 2024-09-13 PROCEDURE — 2500000004 HC RX 250 GENERAL PHARMACY W/ HCPCS (ALT 636 FOR OP/ED): Mod: IPSPLIT | Performed by: STUDENT IN AN ORGANIZED HEALTH CARE EDUCATION/TRAINING PROGRAM

## 2024-09-13 PROCEDURE — 2500000005 HC RX 250 GENERAL PHARMACY W/O HCPCS: Mod: SE | Performed by: EMERGENCY MEDICINE

## 2024-09-13 PROCEDURE — 2500000005 HC RX 250 GENERAL PHARMACY W/O HCPCS: Mod: IPSPLIT | Performed by: STUDENT IN AN ORGANIZED HEALTH CARE EDUCATION/TRAINING PROGRAM

## 2024-09-13 PROCEDURE — 73700 CT LOWER EXTREMITY W/O DYE: CPT | Mod: LEFT SIDE | Performed by: RADIOLOGY

## 2024-09-13 PROCEDURE — 81001 URINALYSIS AUTO W/SCOPE: CPT | Performed by: EMERGENCY MEDICINE

## 2024-09-13 PROCEDURE — 2500000001 HC RX 250 WO HCPCS SELF ADMINISTERED DRUGS (ALT 637 FOR MEDICARE OP): Mod: SE | Performed by: EMERGENCY MEDICINE

## 2024-09-13 PROCEDURE — 94760 N-INVAS EAR/PLS OXIMETRY 1: CPT | Mod: IPSPLIT

## 2024-09-13 PROCEDURE — 72131 CT LUMBAR SPINE W/O DYE: CPT | Mod: FOREIGN READ | Performed by: RADIOLOGY

## 2024-09-13 PROCEDURE — 2500000002 HC RX 250 W HCPCS SELF ADMINISTERED DRUGS (ALT 637 FOR MEDICARE OP, ALT 636 FOR OP/ED): Mod: IPSPLIT | Performed by: STUDENT IN AN ORGANIZED HEALTH CARE EDUCATION/TRAINING PROGRAM

## 2024-09-13 PROCEDURE — 73700 CT LOWER EXTREMITY W/O DYE: CPT | Mod: LT

## 2024-09-13 RX ORDER — TRAMADOL HYDROCHLORIDE 50 MG/1
50 TABLET ORAL EVERY 6 HOURS PRN
Status: DISCONTINUED | OUTPATIENT
Start: 2024-09-13 | End: 2024-09-13

## 2024-09-13 RX ORDER — NYSTATIN 100000 [USP'U]/G
1 POWDER TOPICAL 3 TIMES DAILY PRN
Status: DISCONTINUED | OUTPATIENT
Start: 2024-09-13 | End: 2024-09-15 | Stop reason: HOSPADM

## 2024-09-13 RX ORDER — POLYETHYLENE GLYCOL 3350, SODIUM SULFATE, SODIUM CHLORIDE, POTASSIUM CHLORIDE, ASCORBIC ACID, SODIUM ASCORBATE 140-9-5.2G
1 KIT ORAL DAILY PRN
COMMUNITY

## 2024-09-13 RX ORDER — IBUPROFEN 400 MG/1
400 TABLET ORAL 3 TIMES DAILY
COMMUNITY
Start: 2024-09-11

## 2024-09-13 RX ORDER — ACETAMINOPHEN 325 MG/1
650 TABLET ORAL EVERY 4 HOURS PRN
Status: DISCONTINUED | OUTPATIENT
Start: 2024-09-13 | End: 2024-09-14

## 2024-09-13 RX ORDER — FLUTICASONE PROPIONATE 50 MCG
2 SPRAY, SUSPENSION (ML) NASAL NIGHTLY
COMMUNITY

## 2024-09-13 RX ORDER — ENOXAPARIN SODIUM 100 MG/ML
40 INJECTION SUBCUTANEOUS EVERY 24 HOURS
Status: DISCONTINUED | OUTPATIENT
Start: 2024-09-13 | End: 2024-09-15 | Stop reason: HOSPADM

## 2024-09-13 RX ORDER — ATORVASTATIN CALCIUM 40 MG/1
40 TABLET, FILM COATED ORAL DAILY
Status: DISCONTINUED | OUTPATIENT
Start: 2024-09-14 | End: 2024-09-15 | Stop reason: HOSPADM

## 2024-09-13 RX ORDER — FERROUS SULFATE, DRIED 160(50) MG
1 TABLET, EXTENDED RELEASE ORAL DAILY
COMMUNITY

## 2024-09-13 RX ORDER — TRAMADOL HYDROCHLORIDE 50 MG/1
50 TABLET ORAL EVERY 8 HOURS PRN
COMMUNITY

## 2024-09-13 RX ORDER — ERGOCALCIFEROL 1.25 MG/1
1.25 CAPSULE ORAL WEEKLY
COMMUNITY

## 2024-09-13 RX ORDER — OMEPRAZOLE 20 MG/1
20 CAPSULE, DELAYED RELEASE ORAL DAILY
COMMUNITY

## 2024-09-13 RX ORDER — LISINOPRIL 10 MG/1
10 TABLET ORAL DAILY
COMMUNITY

## 2024-09-13 RX ORDER — TAMSULOSIN HYDROCHLORIDE 0.4 MG/1
0.4 CAPSULE ORAL DAILY
COMMUNITY

## 2024-09-13 RX ORDER — DICYCLOMINE HYDROCHLORIDE 20 MG/1
20 TABLET ORAL
COMMUNITY

## 2024-09-13 RX ORDER — ALENDRONATE SODIUM 70 MG/1
70 TABLET ORAL
COMMUNITY

## 2024-09-13 RX ORDER — LISINOPRIL 10 MG/1
10 TABLET ORAL DAILY
Status: DISCONTINUED | OUTPATIENT
Start: 2024-09-14 | End: 2024-09-15 | Stop reason: HOSPADM

## 2024-09-13 RX ORDER — LIDOCAINE 560 MG/1
1 PATCH PERCUTANEOUS; TOPICAL; TRANSDERMAL DAILY
Status: DISCONTINUED | OUTPATIENT
Start: 2024-09-13 | End: 2024-09-15 | Stop reason: HOSPADM

## 2024-09-13 RX ORDER — TRIAMCINOLONE ACETONIDE 1 MG/G
1 CREAM TOPICAL 2 TIMES DAILY
COMMUNITY

## 2024-09-13 RX ORDER — FLUTICASONE FUROATE AND VILANTEROL 100; 25 UG/1; UG/1
1 POWDER RESPIRATORY (INHALATION)
Status: DISCONTINUED | OUTPATIENT
Start: 2024-09-14 | End: 2024-09-15 | Stop reason: HOSPADM

## 2024-09-13 RX ORDER — ATORVASTATIN CALCIUM 40 MG/1
40 TABLET, FILM COATED ORAL DAILY
COMMUNITY

## 2024-09-13 RX ORDER — DOCUSATE SODIUM 100 MG/1
100 CAPSULE, LIQUID FILLED ORAL 2 TIMES DAILY
COMMUNITY

## 2024-09-13 RX ORDER — POLYETHYLENE GLYCOL 3350 17 G/17G
17 POWDER, FOR SOLUTION ORAL DAILY PRN
Status: DISCONTINUED | OUTPATIENT
Start: 2024-09-13 | End: 2024-09-15 | Stop reason: HOSPADM

## 2024-09-13 RX ORDER — MOMETASONE FUROATE AND FORMOTEROL FUMARATE DIHYDRATE 50; 5 UG/1; UG/1
2 AEROSOL RESPIRATORY (INHALATION)
COMMUNITY

## 2024-09-13 RX ORDER — ALBUTEROL SULFATE 0.83 MG/ML
2.5 SOLUTION RESPIRATORY (INHALATION) EVERY 6 HOURS PRN
Status: DISCONTINUED | OUTPATIENT
Start: 2024-09-13 | End: 2024-09-13

## 2024-09-13 RX ORDER — MORPHINE SULFATE 2 MG/ML
2 INJECTION, SOLUTION INTRAMUSCULAR; INTRAVENOUS
Status: DISCONTINUED | OUTPATIENT
Start: 2024-09-13 | End: 2024-09-15 | Stop reason: HOSPADM

## 2024-09-13 RX ORDER — FLUTICASONE PROPIONATE 50 MCG
2 SPRAY, SUSPENSION (ML) NASAL NIGHTLY
Status: DISCONTINUED | OUTPATIENT
Start: 2024-09-13 | End: 2024-09-15 | Stop reason: HOSPADM

## 2024-09-13 RX ORDER — ALBUTEROL SULFATE 90 UG/1
2 INHALANT RESPIRATORY (INHALATION) EVERY 2 HOUR PRN
Status: DISCONTINUED | OUTPATIENT
Start: 2024-09-13 | End: 2024-09-15 | Stop reason: HOSPADM

## 2024-09-13 RX ORDER — ONDANSETRON HYDROCHLORIDE 2 MG/ML
4 INJECTION, SOLUTION INTRAVENOUS EVERY 8 HOURS PRN
Status: DISCONTINUED | OUTPATIENT
Start: 2024-09-13 | End: 2024-09-15 | Stop reason: HOSPADM

## 2024-09-13 RX ORDER — PHENAZOPYRIDINE HYDROCHLORIDE 200 MG/1
200 TABLET, FILM COATED ORAL 2 TIMES DAILY
COMMUNITY

## 2024-09-13 RX ORDER — ARMODAFINIL 150 MG/1
150 TABLET ORAL DAILY
COMMUNITY

## 2024-09-13 RX ORDER — FAMOTIDINE 20 MG/1
20 TABLET, FILM COATED ORAL DAILY
COMMUNITY

## 2024-09-13 RX ORDER — TRAMADOL HYDROCHLORIDE 50 MG/1
50 TABLET ORAL EVERY 6 HOURS PRN
Status: DISCONTINUED | OUTPATIENT
Start: 2024-09-13 | End: 2024-09-14

## 2024-09-13 RX ORDER — ONDANSETRON 4 MG/1
4 TABLET, FILM COATED ORAL EVERY 6 HOURS PRN
Status: DISCONTINUED | OUTPATIENT
Start: 2024-09-13 | End: 2024-09-15 | Stop reason: HOSPADM

## 2024-09-13 RX ORDER — ACETAMINOPHEN 500 MG
1000 TABLET ORAL EVERY 6 HOURS PRN
COMMUNITY

## 2024-09-13 RX ORDER — TAMSULOSIN HYDROCHLORIDE 0.4 MG/1
0.4 CAPSULE ORAL DAILY
Status: DISCONTINUED | OUTPATIENT
Start: 2024-09-14 | End: 2024-09-15 | Stop reason: HOSPADM

## 2024-09-13 RX ORDER — ATENOLOL 25 MG/1
25 TABLET ORAL DAILY
Status: DISCONTINUED | OUTPATIENT
Start: 2024-09-14 | End: 2024-09-15 | Stop reason: HOSPADM

## 2024-09-13 RX ORDER — PANTOPRAZOLE SODIUM 40 MG/1
40 TABLET, DELAYED RELEASE ORAL
Status: DISCONTINUED | OUTPATIENT
Start: 2024-09-14 | End: 2024-09-15 | Stop reason: HOSPADM

## 2024-09-13 RX ORDER — ATENOLOL 25 MG/1
25 TABLET ORAL DAILY
COMMUNITY

## 2024-09-13 RX ADMIN — TRAMADOL HYDROCHLORIDE 50 MG: 50 TABLET, COATED ORAL at 21:45

## 2024-09-13 RX ADMIN — LIDOCAINE 1 PATCH: 4 PATCH TOPICAL at 11:20

## 2024-09-13 RX ADMIN — FLUTICASONE PROPIONATE 2 SPRAY: 50 SPRAY, METERED NASAL at 22:11

## 2024-09-13 RX ADMIN — TRAMADOL HYDROCHLORIDE 50 MG: 50 TABLET ORAL at 11:20

## 2024-09-13 RX ADMIN — Medication 2 L/MIN: at 21:58

## 2024-09-13 RX ADMIN — ENOXAPARIN SODIUM 40 MG: 40 INJECTION SUBCUTANEOUS at 22:11

## 2024-09-13 SDOH — SOCIAL STABILITY: SOCIAL INSECURITY: ARE YOU OR HAVE YOU BEEN THREATENED OR ABUSED PHYSICALLY, EMOTIONALLY, OR SEXUALLY BY ANYONE?: NO

## 2024-09-13 SDOH — SOCIAL STABILITY: SOCIAL INSECURITY: DO YOU FEEL UNSAFE GOING BACK TO THE PLACE WHERE YOU ARE LIVING?: NO

## 2024-09-13 SDOH — SOCIAL STABILITY: SOCIAL INSECURITY: ARE THERE ANY APPARENT SIGNS OF INJURIES/BEHAVIORS THAT COULD BE RELATED TO ABUSE/NEGLECT?: NO

## 2024-09-13 SDOH — SOCIAL STABILITY: SOCIAL INSECURITY: HAS ANYONE EVER THREATENED TO HURT YOUR FAMILY OR YOUR PETS?: NO

## 2024-09-13 SDOH — SOCIAL STABILITY: SOCIAL INSECURITY: HAVE YOU HAD ANY THOUGHTS OF HARMING ANYONE ELSE?: NO

## 2024-09-13 SDOH — SOCIAL STABILITY: SOCIAL INSECURITY: HAVE YOU HAD THOUGHTS OF HARMING ANYONE ELSE?: NO

## 2024-09-13 SDOH — SOCIAL STABILITY: SOCIAL INSECURITY: ABUSE: ADULT

## 2024-09-13 SDOH — SOCIAL STABILITY: SOCIAL INSECURITY: DO YOU FEEL ANYONE HAS EXPLOITED OR TAKEN ADVANTAGE OF YOU FINANCIALLY OR OF YOUR PERSONAL PROPERTY?: NO

## 2024-09-13 SDOH — SOCIAL STABILITY: SOCIAL INSECURITY: DOES ANYONE TRY TO KEEP YOU FROM HAVING/CONTACTING OTHER FRIENDS OR DOING THINGS OUTSIDE YOUR HOME?: NO

## 2024-09-13 ASSESSMENT — COGNITIVE AND FUNCTIONAL STATUS - GENERAL
DRESSING REGULAR LOWER BODY CLOTHING: A LITTLE
TURNING FROM BACK TO SIDE WHILE IN FLAT BAD: A LITTLE
WALKING IN HOSPITAL ROOM: A LOT
DRESSING REGULAR UPPER BODY CLOTHING: A LITTLE
MOVING FROM LYING ON BACK TO SITTING ON SIDE OF FLAT BED WITH BEDRAILS: A LITTLE
HELP NEEDED FOR BATHING: A LITTLE
PATIENT BASELINE BEDBOUND: NO
MOVING TO AND FROM BED TO CHAIR: A LITTLE
PERSONAL GROOMING: A LITTLE
MOBILITY SCORE: 16
STANDING UP FROM CHAIR USING ARMS: A LITTLE
DAILY ACTIVITIY SCORE: 19
PERSONAL GROOMING: A LITTLE
HELP NEEDED FOR BATHING: A LITTLE
MOVING TO AND FROM BED TO CHAIR: A LITTLE
DRESSING REGULAR LOWER BODY CLOTHING: A LITTLE
MOVING FROM LYING ON BACK TO SITTING ON SIDE OF FLAT BED WITH BEDRAILS: A LITTLE
MOBILITY SCORE: 15
CLIMB 3 TO 5 STEPS WITH RAILING: TOTAL
TOILETING: A LITTLE
STANDING UP FROM CHAIR USING ARMS: A LITTLE
DRESSING REGULAR UPPER BODY CLOTHING: A LITTLE
WALKING IN HOSPITAL ROOM: A LOT
CLIMB 3 TO 5 STEPS WITH RAILING: A LOT
TURNING FROM BACK TO SIDE WHILE IN FLAT BAD: A LITTLE
TOILETING: A LITTLE
DAILY ACTIVITIY SCORE: 19

## 2024-09-13 ASSESSMENT — ACTIVITIES OF DAILY LIVING (ADL)
GROOMING: NEEDS ASSISTANCE
HEARING - LEFT EAR: FUNCTIONAL
TOILETING: NEEDS ASSISTANCE
WALKS IN HOME: NEEDS ASSISTANCE
ADEQUATE_TO_COMPLETE_ADL: YES
PATIENT'S MEMORY ADEQUATE TO SAFELY COMPLETE DAILY ACTIVITIES?: YES
DRESSING YOURSELF: NEEDS ASSISTANCE
BATHING: NEEDS ASSISTANCE
FEEDING YOURSELF: NEEDS ASSISTANCE
JUDGMENT_ADEQUATE_SAFELY_COMPLETE_DAILY_ACTIVITIES: YES
HEARING - RIGHT EAR: FUNCTIONAL
LACK_OF_TRANSPORTATION: NO

## 2024-09-13 ASSESSMENT — PATIENT HEALTH QUESTIONNAIRE - PHQ9
2. FEELING DOWN, DEPRESSED OR HOPELESS: NOT AT ALL
1. LITTLE INTEREST OR PLEASURE IN DOING THINGS: NOT AT ALL
SUM OF ALL RESPONSES TO PHQ9 QUESTIONS 1 & 2: 0

## 2024-09-13 ASSESSMENT — PAIN - FUNCTIONAL ASSESSMENT
PAIN_FUNCTIONAL_ASSESSMENT: 0-10

## 2024-09-13 ASSESSMENT — LIFESTYLE VARIABLES
HOW OFTEN DO YOU HAVE A DRINK CONTAINING ALCOHOL: NEVER
AUDIT-C TOTAL SCORE: 0
SKIP TO QUESTIONS 9-10: 1
AUDIT-C TOTAL SCORE: 0
HOW OFTEN DO YOU HAVE 6 OR MORE DRINKS ON ONE OCCASION: NEVER
HOW MANY STANDARD DRINKS CONTAINING ALCOHOL DO YOU HAVE ON A TYPICAL DAY: PATIENT DOES NOT DRINK

## 2024-09-13 ASSESSMENT — PAIN SCALES - GENERAL
PAINLEVEL_OUTOF10: 10 - WORST POSSIBLE PAIN
PAINLEVEL_OUTOF10: 5 - MODERATE PAIN
PAINLEVEL_OUTOF10: 0 - NO PAIN

## 2024-09-13 ASSESSMENT — PAIN DESCRIPTION - DESCRIPTORS: DESCRIPTORS: ACHING;SHARP

## 2024-09-13 NOTE — ED NOTES
Patient assisted to bedside commode and back to bed after completion. Patient verbalizes severe weakness in left leg and pain.  She states that she does not feel she can care for herself at home and her  is incapable of helping care for her.  She also verbalizes that she was unable to get her prescriptions she received from another hospital the previous because she has no transportation.  I spoke with Dr. Trinidad in the ED and relayed the information I received from the patient.  Patient falls risk updated.     Chuy Villasenor RN  09/13/24 [id number]

## 2024-09-13 NOTE — ED TRIAGE NOTES
Low back pain into left hip and leg since Saturday.  To [facility] ER x2. Prescribed ibuprofen TID, did not  from the pharmacy yet. Took home tramadol today then called 911 to come to  for evaluation

## 2024-09-13 NOTE — CARE PLAN
The patient's goals for the shift include      The clinical goals for the shift include      Patient came from ED with request of placement. Labs, cultures, etc came back ok with platelets being 142 as an abnormal. Admitted from ER and is in bed with call light within reach.

## 2024-09-13 NOTE — ED PROVIDER NOTES
[facility]   ED  Provider Note  9/13/2024 10:22 AM  AC03/AC03      Chief Complaint   Patient presents with    Hip Pain        History of Present Illness:   Nataliia Pandya is a 59 y.o. female presenting to the ED for left lower back and left lateral hip pain, beginning 3 days ago.  The complaint has been persistent, severe in severity, and worsened by movement patient was discharged from [facility] yesterday.  She has ongoing pain that she had during her hospitalization there.  She was prescribed pain medications but has not filled the prescriptions.   The pain is described as sharp and radiates down to her left lateral hip.  She denies loss of bowel or bladder control.  She does not have a fever.  She did have a fall on Saturday with some injury to that area as well.  X-rays left femur  showed degenerative osteoarthritis with no evidence of fracture or dislocation.  CT abdomen pelvis showed diverticulosis with no evidence of diverticulitis.  I do not see imaging studies of the back.  She was discharged home prescriptions for tramadol and ibuprofen which has not been filled.  Patient is tearful and appears to be in pain.      Review of Systems:   Pertinent positives and review of systems as noted above.  Remaining 10 review of systems is negative or noncontributory to today's episode of care.  Review of Systems       --------------------------------------------- PAST HISTORY ---------------------------------------------  Past Medical History: COPD, hypertension, lupus, obstructive sleep apnea, GERD, recent fall, obesity    Past Surgical History: History reviewed. No pertinent surgical history.     Social History:   Social History     Social History Narrative    Not on file        Family History: family history is not on file. Unless otherwise noted, family history is non contributory    Patient's Medications   New Prescriptions    No medications on file   Previous Medications    ALBUTEROL 90 MCG/ACTUATION AEROSOL POWDR  BREATH ACTIVATED INHALER    Inhale 2 puffs every 4 hours if needed for wheezing.    ATENOLOL (TENORMIN) 25 MG TABLET    Take by mouth once daily.    ATORVASTATIN (LIPITOR) 40 MG TABLET    Take 1 tablet (40 mg) by mouth once daily.    IBUPROFEN 400 MG TABLET    Take 1 tablet (400 mg) by mouth 3 times a day.    LISINOPRIL 10 MG TABLET    Take 1 tablet (10 mg) by mouth once daily.    MOMETASONE-FORMOTEROL (DULERA) 50-5 MCG/ACTUATION HFA AEROSOL INHALER INHALER    Inhale 2 puffs 2 times a day.    ONDANSETRON ODT (ZOFRAN-ODT) 4 MG DISINTEGRATING TABLET    Take 1 tablet (4 mg) by mouth every 8 hours if needed for nausea or vomiting.   Modified Medications    No medications on file   Discontinued Medications    No medications on file      The patient’s home medications have been reviewed.    Allergies: Patient has no known allergies.    -------------------------------------------------- RESULTS -------------------------------------------------  All laboratory and radiology results have been personally reviewed by myself   LABS:  Labs Reviewed   URINALYSIS WITH REFLEX MICROSCOPIC - Abnormal       Result Value    Color, Urine Straw      Appearance, Urine Hazy (*)     Specific Gravity, Urine 1.005      pH, Urine 7.0      Protein, Urine NEGATIVE      Glucose, Urine NEGATIVE      Blood, Urine SMALL (1+) (*)     Ketones, Urine NEGATIVE      Bilirubin, Urine NEGATIVE      Urobilinogen, Urine <2.0      Nitrite, Urine NEGATIVE      Leukocyte Esterase, Urine NEGATIVE     MICROSCOPIC ONLY, URINE - Abnormal    WBC, Urine 1-5      RBC, Urine 1-2      Squamous Epithelial Cells, Urine 1-9 (SPARSE)      Bacteria, Urine 3+ (*)    CBC WITH AUTO DIFFERENTIAL   COMPREHENSIVE METABOLIC PANEL   SEDIMENTATION RATE, AUTOMATED   C-REACTIVE PROTEIN   GRAY TOP         RADIOLOGY:  Interpreted by Radiologist.  CT lumbar spine wo IV contrast   Final Result   Dextroscoliosis and multilevel degenerative changes as described.  No   acute fracture or  malalignment.  Findings would better evaluated with   MRI.   Signed by Chuy Jones DO      CT hip left wo IV contrast   Final Result   1.Mild degenerative arthrosis of the left hip and SI joint.   2.No evidence of fracture.   Signed by Alana Skelton MD          No results found for this or any previous visit (from the past 4464 hour(s)).  ------------------------- NURSING NOTES AND VITALS REVIEWED ---------------------------   The nursing notes within the ED encounter and vital signs as below have been reviewed.   /77   Pulse 75   Resp 20   Ht 1.524 m (5')   Wt 92.1 kg (203 lb)   SpO2 97%   BMI 39.65 kg/m²   Oxygen Saturation Interpretation: Normal      ---------------------------------------------------PHYSICAL EXAM--------------------------------------  Physical Exam   Constitutional/General: Alert,  well appearing, non toxic in NAD  Head: Normocephalic and atraumatic  Eyes: PERRL, EOMI, conjunctiva normal, sclera non icteric  Mouth: Oropharynx clear, handling secretions, no trismus, no asymmetry of the posterior oropharynx or uvular edema  Neck: Supple, full ROM, non tender to palpation in the midline, no stridor, no crepitus, no meningeal signs  Respiratory: Lungs clear to auscultation bilaterally, no wheezes, rales, or rhonchi. Not in respiratory distress  Cardiovascular:  Regular rate. Regular rhythm. No murmurs, gallops, or rubs. 2+ distal pulses  Chest: No chest wall tenderness  GI:  Abdomen Soft, Non tender, Non distended.  +BS. No organomegaly, no palpable masses,  No rebound, guarding, or rigidity.   Musculoskeletal: Moves all extremities x 4. Warm and well perfused, no clubbing, cyanosis, or edema. Capillary refill <3 seconds there is has palpation over the left lateral hip.  There is no obvious deformity or dislocation.  There is no erythema of the skin.  There is mild tenderness of the left sciatic notch region as well.  There is no central lumbar tenderness to  palpation.  Integument: skin warm and dry. No rashes.   Lymphatic: no lymphadenopathy noted  Neurologic: No focal deficits, symmetric strength 5/5 in the upper and lower extremities bilaterally  Psychiatric: Normal Affect    Procedures    ------------------------------ ED COURSE/MEDICAL DECISION MAKING----------------------  Diagnoses as of 09/13/24 1505   Pain of left hip   Acute left-sided low back pain without sciatica      Patient's x-rays do not show any acute fracture or dislocation of the lumbar spine or hip.  She has inability to walk due to the pain in these areas.  She was discharged from [facility] ago has not yet picked up her pain medications to control this pain.  Patient unable to walk or care for self at home due to this pain.  I will consult Dr. Keita and arrange for further care and treatment.  I will suggest consider nursing home placement if she cannot be improved with hospital care.      Medical Decision Making:   Admit, patient unable to care for self at home.  She will require nursing home long-term placement.  She require hospital mission in order to qualify for nursing of admission.    Diagnoses as of 09/13/24 1505   Pain of left hip   Acute left-sided low back pain without sciatica      Counseling:   The emergency provider has spoken with the patient and discussed today’s results, in addition to providing specific details for the plan of care and counseling regarding the diagnosis and prognosis.  Questions are answered at this time and they are agreeable with the plan.      --------------------------------- IMPRESSION AND DISPOSITION ---------------------------------        IMPRESSION  1. Pain of left hip    2. Acute left-sided low back pain without sciatica        DISPOSITION  Disposition: Admit to med/surg floor  Patient condition is fair      Billing Provider Critical Care Time: 0 minutes     Chavo Trinidad MD  09/13/24 1389

## 2024-09-14 LAB
ALBUMIN SERPL BCP-MCNC: 3.7 G/DL (ref 3.4–5)
ANION GAP SERPL CALC-SCNC: 12 MMOL/L (ref 10–20)
BUN SERPL-MCNC: 12 MG/DL (ref 6–23)
CALCIUM SERPL-MCNC: 9 MG/DL (ref 8.6–10.3)
CHLORIDE SERPL-SCNC: 104 MMOL/L (ref 98–107)
CO2 SERPL-SCNC: 27 MMOL/L (ref 21–32)
CREAT SERPL-MCNC: 0.64 MG/DL (ref 0.5–1.05)
EGFRCR SERPLBLD CKD-EPI 2021: >90 ML/MIN/1.73M*2
ERYTHROCYTE [DISTWIDTH] IN BLOOD BY AUTOMATED COUNT: 13.7 % (ref 11.5–14.5)
GLUCOSE SERPL-MCNC: 83 MG/DL (ref 74–99)
HCT VFR BLD AUTO: 41.1 % (ref 36–46)
HGB BLD-MCNC: 13.6 G/DL (ref 12–16)
MAGNESIUM SERPL-MCNC: 2.09 MG/DL (ref 1.6–2.4)
MCH RBC QN AUTO: 31.3 PG (ref 26–34)
MCHC RBC AUTO-ENTMCNC: 33.1 G/DL (ref 32–36)
MCV RBC AUTO: 95 FL (ref 80–100)
NRBC BLD-RTO: 0 /100 WBCS (ref 0–0)
PHOSPHATE SERPL-MCNC: 3.8 MG/DL (ref 2.5–4.9)
PLATELET # BLD AUTO: 154 X10*3/UL (ref 150–450)
POTASSIUM SERPL-SCNC: 3.8 MMOL/L (ref 3.5–5.3)
RBC # BLD AUTO: 4.34 X10*6/UL (ref 4–5.2)
SODIUM SERPL-SCNC: 139 MMOL/L (ref 136–145)
WBC # BLD AUTO: 8.1 X10*3/UL (ref 4.4–11.3)

## 2024-09-14 PROCEDURE — 99222 1ST HOSP IP/OBS MODERATE 55: CPT | Performed by: STUDENT IN AN ORGANIZED HEALTH CARE EDUCATION/TRAINING PROGRAM

## 2024-09-14 PROCEDURE — 83735 ASSAY OF MAGNESIUM: CPT | Mod: IPSPLIT | Performed by: STUDENT IN AN ORGANIZED HEALTH CARE EDUCATION/TRAINING PROGRAM

## 2024-09-14 PROCEDURE — 36415 COLL VENOUS BLD VENIPUNCTURE: CPT | Mod: IPSPLIT | Performed by: STUDENT IN AN ORGANIZED HEALTH CARE EDUCATION/TRAINING PROGRAM

## 2024-09-14 PROCEDURE — 80069 RENAL FUNCTION PANEL: CPT | Mod: IPSPLIT | Performed by: STUDENT IN AN ORGANIZED HEALTH CARE EDUCATION/TRAINING PROGRAM

## 2024-09-14 PROCEDURE — 1210000001 HC SEMI-PRIVATE ROOM DAILY: Mod: IPSPLIT

## 2024-09-14 PROCEDURE — 2500000005 HC RX 250 GENERAL PHARMACY W/O HCPCS: Mod: IPSPLIT | Performed by: STUDENT IN AN ORGANIZED HEALTH CARE EDUCATION/TRAINING PROGRAM

## 2024-09-14 PROCEDURE — 97165 OT EVAL LOW COMPLEX 30 MIN: CPT | Mod: GO,IPSPLIT

## 2024-09-14 PROCEDURE — 94664 DEMO&/EVAL PT USE INHALER: CPT | Mod: 59,IPSPLIT

## 2024-09-14 PROCEDURE — 85027 COMPLETE CBC AUTOMATED: CPT | Mod: IPSPLIT | Performed by: STUDENT IN AN ORGANIZED HEALTH CARE EDUCATION/TRAINING PROGRAM

## 2024-09-14 PROCEDURE — 94760 N-INVAS EAR/PLS OXIMETRY 1: CPT | Mod: MUEWO,IPSPLIT

## 2024-09-14 PROCEDURE — 2500000002 HC RX 250 W HCPCS SELF ADMINISTERED DRUGS (ALT 637 FOR MEDICARE OP, ALT 636 FOR OP/ED): Mod: IPSPLIT | Performed by: STUDENT IN AN ORGANIZED HEALTH CARE EDUCATION/TRAINING PROGRAM

## 2024-09-14 PROCEDURE — G0378 HOSPITAL OBSERVATION PER HR: HCPCS

## 2024-09-14 PROCEDURE — 2500000001 HC RX 250 WO HCPCS SELF ADMINISTERED DRUGS (ALT 637 FOR MEDICARE OP): Mod: IPSPLIT | Performed by: STUDENT IN AN ORGANIZED HEALTH CARE EDUCATION/TRAINING PROGRAM

## 2024-09-14 PROCEDURE — 94640 AIRWAY INHALATION TREATMENT: CPT | Mod: IPSPLIT

## 2024-09-14 PROCEDURE — 2500000004 HC RX 250 GENERAL PHARMACY W/ HCPCS (ALT 636 FOR OP/ED): Mod: IPSPLIT | Performed by: STUDENT IN AN ORGANIZED HEALTH CARE EDUCATION/TRAINING PROGRAM

## 2024-09-14 PROCEDURE — 97161 PT EVAL LOW COMPLEX 20 MIN: CPT | Mod: GP,IPSPLIT

## 2024-09-14 PROCEDURE — 9420000001 HC RT PATIENT EDUCATION 5 MIN: Mod: IPSPLIT

## 2024-09-14 RX ORDER — GABAPENTIN 300 MG/1
300 CAPSULE ORAL 3 TIMES DAILY
COMMUNITY

## 2024-09-14 RX ORDER — OXYCODONE HYDROCHLORIDE 5 MG/1
5 TABLET ORAL EVERY 4 HOURS PRN
Status: DISCONTINUED | OUTPATIENT
Start: 2024-09-14 | End: 2024-09-15 | Stop reason: HOSPADM

## 2024-09-14 RX ORDER — ACETAMINOPHEN 325 MG/1
975 TABLET ORAL 3 TIMES DAILY
Status: DISCONTINUED | OUTPATIENT
Start: 2024-09-14 | End: 2024-09-15 | Stop reason: HOSPADM

## 2024-09-14 RX ADMIN — PANTOPRAZOLE SODIUM 40 MG: 40 TABLET, DELAYED RELEASE ORAL at 06:03

## 2024-09-14 RX ADMIN — LIDOCAINE 1 PATCH: 4 PATCH TOPICAL at 08:24

## 2024-09-14 RX ADMIN — TAMSULOSIN HYDROCHLORIDE 0.4 MG: 0.4 CAPSULE ORAL at 08:24

## 2024-09-14 RX ADMIN — ATENOLOL 25 MG: 25 TABLET ORAL at 08:24

## 2024-09-14 RX ADMIN — FLUTICASONE PROPIONATE 2 SPRAY: 50 SPRAY, METERED NASAL at 08:42

## 2024-09-14 RX ADMIN — FLUTICASONE PROPIONATE 2 SPRAY: 50 SPRAY, METERED NASAL at 22:03

## 2024-09-14 RX ADMIN — ACETAMINOPHEN 650 MG: 325 TABLET ORAL at 08:24

## 2024-09-14 RX ADMIN — LISINOPRIL 10 MG: 10 TABLET ORAL at 08:24

## 2024-09-14 RX ADMIN — ATORVASTATIN CALCIUM 40 MG: 40 TABLET, FILM COATED ORAL at 08:24

## 2024-09-14 RX ADMIN — ACETAMINOPHEN 975 MG: 325 TABLET ORAL at 17:51

## 2024-09-14 RX ADMIN — ENOXAPARIN SODIUM 40 MG: 40 INJECTION SUBCUTANEOUS at 22:02

## 2024-09-14 RX ADMIN — FLUTICASONE FUROATE AND VILANTEROL TRIFENATATE 1 PUFF: 100; 25 POWDER RESPIRATORY (INHALATION) at 10:00

## 2024-09-14 ASSESSMENT — PAIN SCALES - GENERAL
PAINLEVEL_OUTOF10: 0 - NO PAIN
PAINLEVEL_OUTOF10: 0 - NO PAIN
PAINLEVEL_OUTOF10: 10 - WORST POSSIBLE PAIN
PAINLEVEL_OUTOF10: 10 - WORST POSSIBLE PAIN
PAINLEVEL_OUTOF10: 3

## 2024-09-14 ASSESSMENT — COGNITIVE AND FUNCTIONAL STATUS - GENERAL
TURNING FROM BACK TO SIDE WHILE IN FLAT BAD: A LITTLE
WALKING IN HOSPITAL ROOM: A LITTLE
DRESSING REGULAR UPPER BODY CLOTHING: A LITTLE
CLIMB 3 TO 5 STEPS WITH RAILING: A LOT
PERSONAL GROOMING: A LITTLE
HELP NEEDED FOR BATHING: A LITTLE
DRESSING REGULAR LOWER BODY CLOTHING: A LITTLE
CLIMB 3 TO 5 STEPS WITH RAILING: A LOT
TURNING FROM BACK TO SIDE WHILE IN FLAT BAD: A LITTLE
TOILETING: A LITTLE
TOILETING: A LITTLE
DRESSING REGULAR UPPER BODY CLOTHING: A LITTLE
DAILY ACTIVITIY SCORE: 19
HELP NEEDED FOR BATHING: A LITTLE
MOBILITY SCORE: 18
STANDING UP FROM CHAIR USING ARMS: A LITTLE
DRESSING REGULAR LOWER BODY CLOTHING: A LITTLE
PERSONAL GROOMING: A LITTLE
WALKING IN HOSPITAL ROOM: A LOT
MOVING TO AND FROM BED TO CHAIR: A LITTLE
STANDING UP FROM CHAIR USING ARMS: A LITTLE
DAILY ACTIVITIY SCORE: 19
MOVING FROM LYING ON BACK TO SITTING ON SIDE OF FLAT BED WITH BEDRAILS: A LITTLE
MOBILITY SCORE: 16
MOVING TO AND FROM BED TO CHAIR: A LITTLE

## 2024-09-14 ASSESSMENT — PAIN - FUNCTIONAL ASSESSMENT
PAIN_FUNCTIONAL_ASSESSMENT: 0-10

## 2024-09-14 ASSESSMENT — ACTIVITIES OF DAILY LIVING (ADL)
BATHING_ASSISTANCE: MINIMAL
ADL_ASSISTANCE: INDEPENDENT

## 2024-09-14 NOTE — PROGRESS NOTES
Occupational Therapy    Evaluation    Patient Name: Nataliia Pandya  MRN: [id number]  Department: [facility] 3  Room: [address] [place] [address]  Today's Date: 9/14/2024  Time Calculation  Start Time: 0900  Stop Time: 0930  Time Calculation (min): 30 min    Assessment  IP OT Assessment  OT Assessment: Pt is a 60 y/o female presenting for a skilled OT evaluation following admission to the hospital for deconditioning following fall. Pt displayed decreased endurance and safety with ADLs and transfers. Pt would benefit from OT at the low vs mod intensity to increase safety and return to PLOF. If pain managed well, patient should have no issues returning home. If pain unable to be managed and patient displaying inability to cope with symptoms, may benefit from short term rehab stay.  Prognosis: Good  Barriers to Discharge: Decreased caregiver support  Evaluation/Treatment Tolerance: Patient limited by fatigue, Patient limited by pain  End of Session Communication: Bedside nurse  End of Session Patient Position: Up in chair, Alarm on  Plan:  Treatment Interventions: ADL retraining, Functional transfer training, Endurance training, Neuromuscular reeducation  OT Frequency: 3 times per week  OT Discharge Recommendations: Low intensity level of continued care, Moderate intensity level of continued care  Equipment Recommended upon Discharge:  (TBD; most likely can return home using quad cane)  OT Recommended Transfer Status: Assist of 1  OT - OK to Discharge: Yes  Based on completed evaluation and care plan recommendations, no barriers to discharge to next site of care.    Subjective   Current Problem:  1. Pain of left hip        2. Acute left-sided low back pain without sciatica          General:  General  Reason for Referral: ADLs/safety following fall  Referred By: Franco Keita DO  Past Medical History Relevant to Rehab: Pt presented to ED wiuth increased L hip and low back pain. Pt has presented to [facility] ED x2 for the same complaint. Pt reports  fall approx 3 days ago with increased pain since. No fx malalignment found on imiging. (PMH: CPOD, HTN, lupus, CHRISTIAN, GERD obesity)  Prior to Session Communication: Bedside nurse  Patient Position Received: Up in chair, Alarm on (direct handoff from PT)  General Comment: Pt pleseant and cooperative throughout session though easily becoming emotional. Pt left with all needs in reach following session.  Precautions:  Medical Precautions: Fall precautions  Vital Sign (Past 2hrs)        Date/Time Vitals Session Patient Position Pulse Resp SpO2 BP MAP (mmHg)    09/14/24 0845 --  --  --  --  96 %  --  --                 Pain:  Pain Assessment  Pain Assessment: 0-10  0-10 (Numeric) Pain Score: 10 - Worst possible pain (Pt not displaying clinical signs of pain throughout session. Able to flex and extend hip; only reporting high amounts of pain.)  Pain Type: Acute pain, Chronic pain  Pain Location: Hip (low back)  Pain Orientation: Left  Pain Interventions: Repositioned, Relaxation technique  Response to Interventions: patient reporting no change in pain; nursing aware    Objective   Cognition:  Overall Cognitive Status: Within Functional Limits     Home Living:  Type of Home: House  Lives With: Spouse  Home Adaptive Equipment: Quad cane (rollator)  Home Layout: One level, Able to live on main level with bedroom/bathroom  Home Access: Ramped entrance  Bathroom Shower/Tub: Walk-in shower  Bathroom Toilet: Low-rise  Bathroom Equipment: Shower chair with back, Bedside commode  Home Living Comments: sleeps in regualar bed; reports she was using bedside commode at home d/t difficulty getting off of the commode.     Prior Function:  Level of [place]: Independent with ADLs and functional transfers, Independent with homemaking with ambulation  Receives Help From:  (spouse PRN though he has multiple health conditions himself)  ADL Assistance: Independent  Homemaking Assistance: Independent  Ambulatory Assistance: Independent (with  quad cane)  Vocational: Retired  Hand Dominance: Right    IADL History:  Homemaking Responsibilities: Yes (primary for all)   Responsibility: Secondary (sometimes babysits grandchildren)  Current License: No  Mode of Transportation:  ( drives; dtr currently has car to get to and from work)    ADL:  Eating Assistance: Independent  Grooming Assistance: Stand by  Bathing Assistance: Minimal  UE Dressing Assistance: Stand by  LE Dressing Assistance: Stand by  LE Dressing Deficit: Increased time to complete, Supervision/safety, Requires assistive device for steadying, Verbal cueing (educated on use of reacher for ease of donning over L foot. able to complete at EOB without issue; anticipate requiring increased assistance for socks and shoes at this time.)  Toileting Assistance with Device: Stand by  Functional Assistance:  (pt unable to complete IADLs at home at this time d/t decreased functional status d/t pain.)    Activity Tolerance:  Endurance: Tolerates less than 10 min exercise, no significant change in vital signs    Bed Mobility/Transfers:   Transfers  Transfer: Yes  Transfer 1  Transfer From 1: Chair with arms to  Transfer to 1: Stand, Sit  Technique 1: Sit to stand, Stand to sit  Transfer Device 1: Walker  Transfer Level of Assistance 1: Close supervision  Trials/Comments 1: slow to rise  Transfers 2  Transfer From 2: Bed to  Transfer to 2: Stand  Technique 2: Sit to stand, Stand to sit  Transfer Device 2: Walker  Transfer Level of Assistance 2: Contact guard  Trials/Comments 2: lowered surface requiring rocking technique to facilitate. Able to complete with no issues after emotions calmed.    Functional Mobility:  Functional Mobility  Functional Mobility Performed: Yes  Functional Mobility 1  Surface 1: Level tile  Device 1: Rolling walker  Assistance 1: Close supervision  Comments 1: pt requiring heavy VC d/t not being familiar with how to ambualte with FWW, will trial cane next visit though  this therapist beleives she would be safe to urilize cane at this time if used properly.    Sitting Balance:  Static Sitting Balance  Static Sitting-Level of Assistance: Modified Independent  Dynamic Sitting Balance  Dynamic Sitting-Level of Assistance: Close supervision    Standing Balance:  Static Standing Balance  Static Standing-Level of Assistance: Close supervision  Dynamic Standing Balance  Dynamic Standing-Level of Assistance: Contact guard     IADL's:   Homemaking Responsibilities: Yes (primary for all)   Responsibility: Secondary (sometimes babysits grandchildren)  Current License: No  Mode of Transportation:  ( drives; dtr currently has car to get to and from work)    Sensation:  Sensation Comment: pt reports carpel tunnel in R hand causing intermitant numbness and tingling in fingers, no other sensation deficits identified.     Coordination:  Movements are Fluid and Coordinated: Yes     Hand Function:  Hand Function  Gross Grasp: Functional  Coordination: Functional    Extremities:   RUE   RUE :  (pt becoming emotional during MMT and not participating fully; per PT report and visual functional assessment WFL) and LUE   LUE:  (pt becoming emotional during MMT and not participating fully; per PT report and visual functional assessment WFL)    Outcome Measures:   [facility] Daily Activity  Putting on and taking off regular lower body clothing: A little  Bathing (including washing, rinsing, drying): A little  Putting on and taking off regular upper body clothing: A little  Toileting, which includes using toilet, bedpan or urinal: A little  Taking care of personal grooming such as brushing teeth: A little  Eating Meals: None  Daily Activity - Total Score: 19    Education Documentation  ADL Training, taught by Jeannette Heaton OT at 9/14/2024 10:04 AM.  Learner: Patient  Readiness: Acceptance  Method: Explanation  Response: Verbalizes Understanding, Needs Reinforcement  Comment: safety with  ADLS, use of reacher; use of call bell    Education Comments  No comments found.      Goals:   Encounter Problems       Encounter Problems (Active)       ADLs       Patient will participate in bathing with modified independence while seated on shower chair using PRN adaptive equipment.         Start:  09/14/24    Expected End:  09/28/24            Patient will participate in all tolieting activities with modified independence using a regular toilet seat.        Start:  09/14/24    Expected End:  09/28/24            Patient will participate in lower body dressing with modified independence while sitting EOB using PRN adaptive equipment.          Start:  09/14/24    Expected End:  09/28/24               BALANCE       Patient will maintain balance MOD I with all ADL/IADL activities to increase safety at home.        Start:  09/14/24    Expected End:  09/28/24               MOBILITY       Patient will functionally ambulate household distances with modified independence using the least restrictive device to be able to participate safely in ADL/IADL tasks.         Start:  09/14/24    Expected End:  09/28/24               TRANSFERS       Patient will participate in bed mobility with modified independence to increase safety with mobility at home.         Start:  09/14/24    Expected End:  09/28/24            Patient will participate in sit to stand transfers with modified independence using least restrictive device to increase safety with ADLs.        Start:  09/14/24    Expected End:  09/28/24

## 2024-09-14 NOTE — CONSULTS
COPD Consult     [facility]  Room 314    1.Do you have any home inhalers?   Patient states yes, one everyday (Dulera) one as needed (Albuterol MDI). Patient knows to rinse mouth after use, and believes she gets relief after use.     2. When were you diagnosed with COPD?   Patient is unsure the exact date but knows she has had it for a while now.    3. Any previous PFTs?   Patient sates yes, she gets them every 6-12 months, her last one according to her charts was 4/10/24. Patient understands the important of PFTs.     4. Do you have a pulmonary doctor?   Patient states yes, her doctor is Waqas Leija MD ([phone]). Her last appointment was 8/30/24.     5. Do you currently smoke or vape or have you ever?  Patient states yes, she smokes 1ppd for a long time. Patient does not plan to quit at this time. Smoking education given and class information given.     6. Do you have a primary care doctor?   Patient states yes, Sandip Schmitz DO ([phone]). Patient does not know her last appointment.     7. Do you have any home O2 or CPAP/BiPAP?   Patient states yes, she uses CPAP every night. Her settings are auto pap 4-8. Patients machine is with [employer]. Patient also uses 2L bleed in for her home CPAP with [employer]. Patient said she has had a 6mwt recently and an ABG done, she is unsure the exact date.     This RT to see patient for a COPD consult. The patient was given a COPD booklet with educational materials regarding pulmonary issues. Smoking cessation education review with patient declining at this time. Materials given to patient if she decides she would like to quit. [employer] support group information given to patient. Patient was diagnosed with COPD a while ago and she states she is keeping up with her pulmonologist to manage her disease. We discussed the mediations she is on and how they help, patient has no further questions on medication. Patient states she uses her CPAP every night and it helps her  sleep much better. Her current settings are autopap 4-8 with [employer]. She is currently requiring O2 with her CPAP at night, 2L with [employer]. Patient was receptive to all information given.       Errol Gomez, RRT

## 2024-09-14 NOTE — CARE PLAN
The patient's goals for the shift include      The clinical goals for the shift include Pt will report decrease in pain this shift    Over the shift, the patient had an uneventful night  VSS  Afebrile  Medicated x 1 for c/o left hip pain with oral medication with good effect  Turns well in bed  Purewick with small amount out (+) urinary  incontinence  Home BiPap used overnight  Call light in reach  Safety measures maintained

## 2024-09-14 NOTE — CARE PLAN
The patient's goals for the shift include      The clinical goals for the shift include Pt will report decrease in pain this shift    The patient reported more pain during shift. Lower back area. Patient did transfer with standby assist to bathroom a couple of times. Patient visited by friend. In bed with call bell within reach.

## 2024-09-14 NOTE — PROGRESS NOTES
Physical Therapy    Physical Therapy Evaluation    Patient Name: Nataliia Pandya  MRN: [id number]  Department: [facility]  Room: [address] [place] [address]  Today's Date: 9/14/2024   Time Calculation  Start Time: 0845  Stop Time: 0911  Time Calculation (min): 26 min    Assessment/Plan   PT Assessment:The patient was evaluated by PT and presents with significant functional mobility deficits interfering with the ability to perform baseline mobility tasks. Recommendation is for skilled PT to address these deficits and work to return patient to prior level of function.   PT Assessment Results: Decreased range of motion, Obesity, Pain  Rehab Prognosis: Good  Evaluation/Treatment Tolerance: Patient limited by pain  Medical Staff Made Aware: Yes  Barriers to Participation: Comorbidities  End of Session Communication: Bedside nurse  End of Session Patient Position: Up in chair, Alarm on  IP OR SWING BED PT PLAN  Inpatient or Swing Bed: Inpatient  PT Plan  PT Plan: Ongoing PT  PT Frequency: 3 times per week  PT Discharge Recommendations: Low intensity level of continued care (Once pain under control)  PT Recommended Transfer Status: Assist x1  PT - OK to Discharge: Yes  Based on completed evaluation and care plan recommendations, no barriers to discharge to next site of care      Subjective   General Visit Information:  General  Reason for Referral: Assess mobility s/p admission woth a fall and L hip pain  Referred By: Dr. Keita  Past Medical History Relevant to Rehab: COPD, hypertension, hyperlipidemia, diverticulitis, osteoarthritis, lupus, sleep apnea, constipation, GERD (HPI: patient had fall at home, was seen in ER at [facility] and after fracture was ruled out, was discharged home with pain meds. The patient was not able to fill prescriptions and came [place] [facility] [place] continued L hip pain.)  Prior to Session Communication: Bedside nurse  Patient Position Received: Bed, 2 rail up, Alarm on  General Comment: Intermittently tearful 2/2 pain. Just  medicated per nursing.    Home Living:  Home Living  Type of Home: House  Lives With: Spouse  Home Adaptive Equipment: Quad cane (rollator)  Home Layout: Able to live on main level with bedroom/bathroom  Home Access: Ramped entrance  Bathroom Accessibility: No issues    Prior Level of Function:  Prior Function Per Pt/Caregiver Report  Level of Moran: Independent with ADLs and functional transfers, Independent with homemaking with ambulation    Precautions:  falls     Vital Signs (Past 2hrs)        Date/Time Vitals Session Patient Position Pulse Resp SpO2 BP MAP (mmHg)    09/14/24 0730 --  --  75  18  93 %  152/78  --     09/14/24 0800 --  --  --  --  93 %  --  --     09/14/24 0845 --  --  --  --  96 %  --  --                   Objective   Pain:  Pain Assessment  Pain Assessment: 0-10  0-10 (Numeric) Pain Score: 10 - Worst possible pain  Pain Location: Hip  Pain Orientation: Left, Outer    Cognition:  Cognition  Overall Cognitive Status: Within Functional Limits    General Assessments:   Activity Tolerance  Endurance: Tolerates less than 10 min exercise, no significant change in vital signs    Sensation  Light Touch: No apparent deficits    Strength  Strength Comments: B UE's and RLE WFL. L hip n/t 2/2 patient intolerance. NM intact distally.     Static Sitting Balance  Static Sitting-Level of Assistance: Modified independent  Dynamic Sitting Balance  Dynamic Sitting-Level of Assistance: Close supervision    Static Standing Balance  Static Standing-Level of Assistance: Close supervision  Dynamic Standing Balance  Dynamic Standing-Level of Assistance: Close supervision    Functional Assessments:  Bed Mobility  Bed Mobility: Yes  Bed Mobility 1  Bed Mobility 1: Supine to sitting  Level of Assistance 1: Contact guard    Transfers  Transfer: Yes  Transfer 1  Technique 1: Sit to stand, Stand to sit  Transfer Device 1: Walker  Transfer Level of Assistance 1: Close supervision    Ambulation/Gait  Training  Ambulation/Gait Training Performed: Yes  Ambulation/Gait Training 1  Surface 1: Level tile  Device 1: Rolling walker  Assistance 1: Close supervision  Quality of Gait 1: Diminished heel strike, Antalgic  Comments/Distance (ft) 1: 2x12 ft (to BR)    Outcome Measures:  [facility] Basic Mobility  Turning from your back to your side while in a flat bed without using bedrails: None  Moving from lying on your back to sitting on the side of a flat bed without using bedrails: A little  Moving to and from bed to chair (including a wheelchair): A little  Standing up from a chair using your arms (e.g. wheelchair or bedside chair): A little  To walk in hospital room: A little  Climbing 3-5 steps with railing: A lot  Basic Mobility - Total Score: 18    Encounter Problems       Encounter Problems (Active)       PT Problem       The patient will perform their required bed mobility tasks with Odin assist to allow increased ability to mobilize post discharge.        Start:  09/14/24    Expected End:  09/21/24            The patient will perform all functional transfers with Odin assist for improved completion of essential mobility tasks.        Start:  09/14/24    Expected End:  09/21/24            The patient will ambulate 50 ft with Rolling walker/rollator and Odin assist to restore ability to safely participate in household distance ambulation.        Start:  09/14/24    Expected End:  09/14/24               Pain - Adult              Education Documentation  Body Mechanics, taught by Trell Cortes, PT at 9/14/2024  9:26 AM.  Learner: Patient  Readiness: Acceptance  Method: Explanation  Response: Verbalizes Understanding  Comment: Patient taught proper mechanics for supine to sit fo pain avoidance.    Education Comments  No comments found.

## 2024-09-15 VITALS
TEMPERATURE: 98.2 F | BODY MASS INDEX: 40.12 KG/M2 | RESPIRATION RATE: 16 BRPM | OXYGEN SATURATION: 93 % | HEART RATE: 70 BPM | HEIGHT: 60 IN | WEIGHT: 204.37 LBS | DIASTOLIC BLOOD PRESSURE: 71 MMHG | SYSTOLIC BLOOD PRESSURE: 105 MMHG

## 2024-09-15 PROCEDURE — 99239 HOSP IP/OBS DSCHRG MGMT >30: CPT | Performed by: STUDENT IN AN ORGANIZED HEALTH CARE EDUCATION/TRAINING PROGRAM

## 2024-09-15 PROCEDURE — 9420000001 HC RT PATIENT EDUCATION 5 MIN: Mod: IPSPLIT

## 2024-09-15 PROCEDURE — 2500000002 HC RX 250 W HCPCS SELF ADMINISTERED DRUGS (ALT 637 FOR MEDICARE OP, ALT 636 FOR OP/ED): Mod: IPSPLIT | Performed by: STUDENT IN AN ORGANIZED HEALTH CARE EDUCATION/TRAINING PROGRAM

## 2024-09-15 PROCEDURE — G0378 HOSPITAL OBSERVATION PER HR: HCPCS

## 2024-09-15 PROCEDURE — 94760 N-INVAS EAR/PLS OXIMETRY 1: CPT | Mod: IPSPLIT

## 2024-09-15 PROCEDURE — 2500000001 HC RX 250 WO HCPCS SELF ADMINISTERED DRUGS (ALT 637 FOR MEDICARE OP): Mod: IPSPLIT | Performed by: STUDENT IN AN ORGANIZED HEALTH CARE EDUCATION/TRAINING PROGRAM

## 2024-09-15 PROCEDURE — 94640 AIRWAY INHALATION TREATMENT: CPT | Mod: IPSPLIT

## 2024-09-15 PROCEDURE — 2500000005 HC RX 250 GENERAL PHARMACY W/O HCPCS: Mod: IPSPLIT | Performed by: STUDENT IN AN ORGANIZED HEALTH CARE EDUCATION/TRAINING PROGRAM

## 2024-09-15 PROCEDURE — 94660 CPAP INITIATION&MGMT: CPT | Mod: IPSPLIT

## 2024-09-15 RX ADMIN — ACETAMINOPHEN 975 MG: 325 TABLET ORAL at 00:05

## 2024-09-15 RX ADMIN — TAMSULOSIN HYDROCHLORIDE 0.4 MG: 0.4 CAPSULE ORAL at 08:42

## 2024-09-15 RX ADMIN — ATORVASTATIN CALCIUM 40 MG: 40 TABLET, FILM COATED ORAL at 08:42

## 2024-09-15 RX ADMIN — PANTOPRAZOLE SODIUM 40 MG: 40 TABLET, DELAYED RELEASE ORAL at 06:18

## 2024-09-15 RX ADMIN — ACETAMINOPHEN 975 MG: 325 TABLET ORAL at 08:42

## 2024-09-15 RX ADMIN — ATENOLOL 25 MG: 25 TABLET ORAL at 08:42

## 2024-09-15 RX ADMIN — LIDOCAINE 1 PATCH: 4 PATCH TOPICAL at 08:41

## 2024-09-15 RX ADMIN — ACETAMINOPHEN 975 MG: 325 TABLET ORAL at 14:22

## 2024-09-15 RX ADMIN — LISINOPRIL 10 MG: 10 TABLET ORAL at 08:42

## 2024-09-15 RX ADMIN — FLUTICASONE FUROATE AND VILANTEROL TRIFENATATE 1 PUFF: 100; 25 POWDER RESPIRATORY (INHALATION) at 09:23

## 2024-09-15 ASSESSMENT — PAIN SCALES - GENERAL
PAINLEVEL_OUTOF10: 5 - MODERATE PAIN
PAINLEVEL_OUTOF10: 5 - MODERATE PAIN

## 2024-09-15 ASSESSMENT — PAIN - FUNCTIONAL ASSESSMENT: PAIN_FUNCTIONAL_ASSESSMENT: 0-10

## 2024-09-15 NOTE — CARE PLAN
The patient's goals for the shift include      The clinical goals for the shift include Patient will tolerate ambulating throughout shift and possible discharge.    Patient met goals for shift. Walked the halls with staff and walker. Received a shower. Ambulation was tolerated. Patient got the go ahead for discharge. Discussed education, appointments and medications with patient. Removed IV. Patient's  picked up patient and went home.

## 2024-09-15 NOTE — H&P
[facility]    Nataliia Pandya is a 59 y.o. female PMHx significant for COPD, HTN, HLD, diverticulitis, osteoarthritis, SLE, CHRISTIAN, chronic constipation, & GERDwho presented to [facility] ED due to worsening left hip pain and weakness. The patient reports that the symptoms began 4 days ago following a fall from her bed while turning over. She was evaluated in the ED on 9/7, where she was diagnosed with nephrolithiasis, which she believes has since passed after completing a full course of antibiotics. However, her hip pain has progressively worsened, exacerbated by ambulation and movement of the left leg, and currently rated as 8/10 in severity. The pain is relieved with rest. She denies any associated fever, chills, chest pain, dyspnea, syncope, loss of consciousness, upper respiratory symptoms, nausea, vomiting, abdominal pain, or changes in bowel habits.    Notably, the patient was discharged from [facility] yesterday after being evaluated for the same issue, at which time outpatient physical and occupational therapy was recommended. A femur X-ray taken at that time revealed no acute fractures.    12 Point ROS negative unless noted in above [facility]    ED Course   Vitals - /74 (BP Location: Left arm, Patient Position: Lying)   Pulse 64   Temp 36.1 °C (97 °F) (Temporal)   Resp 16   Wt 92.7 kg (204 lb 5.9 oz)   SpO2 98%   Labs - No results found for this or any previous visit (from the past 24 hour(s)).  Interventions -   Medications   lidocaine 4 % patch 1 patch (0 patches transdermal Medication Removed 9/14/24 2146)   nystatin (Mycostatin) 100,000 unit/gram powder 1 Application (has no administration in time range)   ondansetron (Zofran) tablet 4 mg (has no administration in time range)   morphine injection 2 mg (has no administration in time range)   atenolol (Tenormin) tablet 25 mg (25 mg oral Given 9/14/24 0824)   atorvastatin (Lipitor) tablet 40 mg (40 mg oral Given 9/14/24 0824)    fluticasone (Flonase) nasal spray 2 spray (2 sprays Each Nostril Given 9/14/24 2203)   lisinopril tablet 10 mg (10 mg oral Given 9/14/24 0824)   fluticasone furoate-vilanteroL (Breo Ellipta) 100-25 mcg/dose inhaler 1 puff (1 puff inhalation Given 9/14/24 1000)   pantoprazole (ProtoNix) EC tablet 40 mg (40 mg oral Given 9/15/24 0618)   tamsulosin (Flomax) 24 hr capsule 0.4 mg (0.4 mg oral Given 9/14/24 0824)   enoxaparin (Lovenox) syringe 40 mg (40 mg subcutaneous Given 9/14/24 2202)   ondansetron (Zofran) injection 4 mg (has no administration in time range)   polyethylene glycol (Glycolax, Miralax) packet 17 g (has no administration in time range)   oxygen (O2) therapy (2 L/min inhalation Start 9/13/24 2158)   albuterol 90 mcg/actuation inhaler 2 puff (has no administration in time range)   oxyCODONE (Roxicodone) immediate release tablet 5 mg (has no administration in time range)   acetaminophen (Tylenol) tablet 975 mg (975 mg oral Given 9/15/24 0005)       Past Medical History   History reviewed. No pertinent past medical history.     Surgical History   History reviewed. No pertinent surgical history.    Family History   No family history on file.    Social History   She reports that she has been smoking cigarettes. She has never used smokeless tobacco. She reports that she does not use drugs. No history on file for alcohol use.    Allergies   Patient has no known allergies.    Medications     Current Facility-Administered Medications:     acetaminophen (Tylenol) tablet 975 mg, 975 mg, oral, TID, Franco Keita DO, 975 mg at 09/15/24 0005    albuterol 90 mcg/actuation inhaler 2 puff, 2 puff, inhalation, q2h PRN, Franco Keita DO    atenolol (Tenormin) tablet 25 mg, 25 mg, oral, Daily, Franco Keita DO, 25 mg at 09/14/24 0824    atorvastatin (Lipitor) tablet 40 mg, 40 mg, oral, Daily, Franco Keita DO, 40 mg at 09/14/24 0824    enoxaparin (Lovenox) syringe 40 mg, 40 mg, subcutaneous, q24h, Franco Keita,  , 40 mg at 09/14/24 2202    fluticasone (Flonase) nasal spray 2 spray, 2 spray, Each Nostril, Nightly, Franco Keita DO, 2 spray at 09/14/24 2203    fluticasone furoate-vilanteroL (Breo Ellipta) 100-25 mcg/dose inhaler 1 puff, 1 puff, inhalation, Daily, Franco Keita DO, 1 puff at 09/14/24 1000    lidocaine 4 % patch 1 patch, 1 patch, transdermal, Daily, Franco Keita DO, 1 patch at 09/14/24 0824    lisinopril tablet 10 mg, 10 mg, oral, Daily, Franco Keita DO, 10 mg at 09/14/24 0824    morphine injection 2 mg, 2 mg, intravenous, q3h PRN, Franco Keita DO    nystatin (Mycostatin) 100,000 unit/gram powder 1 Application, 1 Application, Topical, TID PRN, Franco Keita DO    ondansetron (Zofran) injection 4 mg, 4 mg, intravenous, q8h PRN, Franco Keita DO    ondansetron (Zofran) tablet 4 mg, 4 mg, oral, q6h PRN, Franco Keita DO    oxyCODONE (Roxicodone) immediate release tablet 5 mg, 5 mg, oral, q4h PRN, Franco Keita DO    oxygen (O2) therapy, , inhalation, Continuous PRN - O2/gases, Franco Keita DO, 2 L/min at 09/13/24 2158    pantoprazole (ProtoNix) EC tablet 40 mg, 40 mg, oral, Daily before breakfast, Franco Keita DO, 40 mg at 09/15/24 0618    polyethylene glycol (Glycolax, Miralax) packet 17 g, 17 g, oral, Daily PRN, Franco Keita DO    tamsulosin (Flomax) 24 hr capsule 0.4 mg, 0.4 mg, oral, Daily, Franco Keita DO, 0.4 mg at 09/14/24 0824    Objective   Vital Signs:  Blood pressure 122/74, pulse 64, temperature 36.1 °C (97 °F), temperature source Temporal, resp. rate 16, height 1.524 m (5'), weight 92.7 kg (204 lb 5.9 oz), SpO2 98%.    Physical Exam  Vitals and nursing note reviewed.   Constitutional:       General: She is not in acute distress.     Appearance: She is obese.   HENT:      Head: Normocephalic and atraumatic.      Mouth/Throat:      Mouth: Mucous membranes are moist.   Eyes:      Extraocular Movements: Extraocular movements intact.      Conjunctiva/sclera: Conjunctivae  normal.   Cardiovascular:      Rate and Rhythm: Normal rate and regular rhythm.      Pulses: Normal pulses.      Heart sounds: Normal heart sounds.   Pulmonary:      Effort: Pulmonary effort is normal.      Breath sounds: Normal breath sounds.   Abdominal:      General: Abdomen is flat. Bowel sounds are normal.      Palpations: Abdomen is soft.   Musculoskeletal:         General: Tenderness present.   Skin:     General: Skin is warm.      Findings: No bruising.   Neurological:      Mental Status: She is alert and oriented to person, place, and time.      Motor: Weakness present.      Gait: Gait abnormal.   Psychiatric:         Mood and Affect: Mood normal.         Behavior: Behavior normal.         Thought Content: Thought content normal.         Judgment: Judgment normal.         Wt Readings from Last 6 Encounters:   09/13/24 92.7 kg (204 lb 5.9 oz)   03/06/24 98 kg (216 lb)       I/Os:    Intake/Output Summary (Last 24 hours) at 9/15/2024 0736  Last data filed at 9/14/2024 2000  Gross per 24 hour   Intake 1200 ml   Output --   Net 1200 ml       Labs:   No results found for this or any previous visit (from the past 24 hour(s)).    Imaging:  CT hip left wo IV contrast    Result Date: 9/13/2024  STUDY: CT Extremity; Completed Time:  09/13/2024, 11:58 AM. INDICATION: Left lateral hip pain status post fall. COMPARISON: CT AP: 05/14/21. ACCESSION NUMBER(S): [id number] ORDERING CLINICIAN: MANUEL FISHER TECHNIQUE:  Thin section axial images were obtained through the left hip without intravenous contrast.  Orthogonal reconstructed images were obtained and reviewed.  Automated mA/kV exposure control was utilized and patient examination was performed in strict accordance with principles of ALARA. FINDINGS:  There is mild degenerative arthrosis of the left hip joint.  There is mild degenerative arthrosis of the left SI joint.  There is no evidence of fracture.  The adductor muscles, the hamstring muscles, the quadriceps  muscle, the gluteus muscles show no definite abnormality.  There is no evidence of joint effusion.  There is no abnormal mass in the pelvis.    1.Mild degenerative arthrosis of the left hip and SI joint. 2.No evidence of fracture. Signed by Alana Skelton MD    CT lumbar spine wo IV contrast    Result Date: 9/13/2024  STUDY: CT Lumbar Spine without IV Contrast; 9/13/24 at 11:58 AM INDICATION: Back pain, lateral hip pain post fall. COMPARISON: None available. Correlation CT AP 5/14/21. ACCESSION NUMBER(S): [id number] ORDERING CLINICIAN: MANUEL FISHER TECHNIQUE:  CT of the lumbar spine was performed without intravenous or intrathecal contrast.  Sagittal and coronal reconstructions were generated.  Automated mA/kV exposure control was utilized and patient examination was performed in strict accordance with principles of ALARA. FINDINGS: The alignment is anatomic.  There is no fracture or traumatic subluxation.  There is dextroscoliosis of the lumbar spine.  There is no acute fracture or malalignment.  This multilevel disc space narrowing and endplate degenerative changes with moderate to advanced facet arthrosis greater at the lower lumbar spine which appears greater at L4-5.  There are small Schmorl's node deformities greater at the inferior endplate of L1.  There are anterior marginal osteophytes.  Posterior elements are intact. Evaluation of the central canal is limited due to lack of intrathecal contrast. At L1-2, there is left foraminal disc protrusion and facet arthrosis. Central canal is patent with advanced left neural foraminal stenosis. At L2-3, there is left paracentral foraminal disc protrusion that contributes to left lateral recess stenosis.  There is left greater than right facet arthrosis.  There is advanced left neural foraminal stenosis. At L3-4, there is mild annular disc bulge and left greater than right facet arthrosis.  There is mild narrowing of central canal with moderate left neural  foraminal stenosis. At L4-5, there is broad-based disc bulge and facet arthrosis.  Central canal is patent.  There is advanced right and moderate left neural foraminal stenosis. At L5-S1, there is mild annular disc bulge and mild facet arthrosis. There is moderate right and advanced left neural foraminal stenosis. Visualized portions of the sacrum and pelvis are intact with mild degenerative changes of the sacroiliac joints. There is fatty liver morphology.  Gallbladder surgically absent. There is sigmoid colon diverticulosis without evidence of diverticulitis. The paravertebral soft tissues are within normal limits.  The visualized abdomen is unremarkable.    Dextroscoliosis and multilevel degenerative changes as described.  No acute fracture or malalignment.  Findings would better evaluated with MRI. Signed by Chuy Jones DO     Assessment & Plan    Nataliia JOLANTA Pandya is a 59 y.o. female admitted to [facility] for management of:    Ambulatory dysfunction  Left hip pain 2/2 fall  - CT left hip with no acute findings  - PT/OT, fall precautions  - Pain control: Tylenol 975 mg q8h, Oxycodone 5 mg + Morphine 2 mg prn    Chronic Medical Conditions  #HTN: Lisinopril 10 mg, Atenolol 25 mg   #COPD: Dulera equivalent. Albuterol PRN  #CHRISTIAN: BIPAP at night. Armodafinil not available   #GERD: Protonix 40 mg  #HLD: Atorvastatin 40 mg  #Prior Left Ureteric Stone: urology follow up    Disposition: Patient will likely require placement due to ambulatory dysfunction. Estimated LOS > 2 midnights.    Franco Keita DO  Internal Medicine    I spent a total of 55 minutes on the date of the service which included preparing to see the patient, face-to-face patient care, completing clinical documentation, obtaining and/or reviewing separately obtained history, performing a medically appropriate examination, counseling and educating the patient/family/caregiver, ordering medications, tests, or procedures, independently interpreting  results (not separately reported), and communicating results to the patient/family/caregiver.

## 2024-09-16 ENCOUNTER — TELEPHONE (OUTPATIENT)
Dept: HOME HEALTH SERVICES | Facility: HOME HEALTH | Age: 59
End: 2024-09-16
Payer: COMMERCIAL

## 2024-09-16 ENCOUNTER — PATIENT OUTREACH (OUTPATIENT)
Dept: CARE COORDINATION | Facility: CLINIC | Age: 59
End: 2024-09-16
Payer: COMMERCIAL

## 2024-09-16 ENCOUNTER — DOCUMENTATION (OUTPATIENT)
Dept: HOME HEALTH SERVICES | Facility: HOME HEALTH | Age: 59
End: 2024-09-16
Payer: COMMERCIAL

## 2024-09-16 ENCOUNTER — HOME HEALTH ADMISSION (OUTPATIENT)
Dept: HOME HEALTH SERVICES | Facility: HOME HEALTH | Age: 59
End: 2024-09-16
Payer: COMMERCIAL

## 2024-09-16 SDOH — ECONOMIC STABILITY: GENERAL: WOULD YOU LIKE HELP WITH ANY OF THE FOLLOWING NEEDS?: I DONT NEED HELP WITH ANY OF THESE

## 2024-09-16 NOTE — SIGNIFICANT EVENT
09/16/24 1318   Social Determinants of Health- Help Requested   Would you like help with any of the following needs? I dont need help with any of these

## 2024-09-16 NOTE — PROGRESS NOTES
Outreach call to patient to support a smooth transition of care from recent admission.  Spoke with patient, reviewed discharge medications, discharge instructions, assessed social needs, and provided education on importance of follow-up appointment with provider.  Will continue to monitor through transition period.   Engagement  Call Start Time: 1317 (9/16/2024  1:17 PM)    Medications  Medications reviewed with patient/caregiver?: Yes (9/16/2024  1:17 PM)  Is the patient having any side effects they believe may be caused by any medication additions or changes?: No (9/16/2024  1:17 PM)  Does the patient have all medications ordered at discharge?: Yes (9/16/2024  1:17 PM)  Is the patient taking all medications as directed (includes completed medication regime)?: Yes (9/16/2024  1:17 PM)    Appointments  Does the patient have a primary care provider?: Yes (9/16/2024  1:17 PM)  Care Management Interventions: Educated patient on importance of making appointment; Advised patient to make appointment (9/16/2024  1:17 PM)    Patient Teaching  Does the patient have access to their discharge instructions?: Yes (9/16/2024  1:17 PM)  What is the patient's perception of their health status since discharge?: Improving (9/16/2024  1:17 PM)  Is the patient/caregiver able to teach back the hierarchy of who to call/visit for symptoms/problems? PCP, Specialist, Home Health nurse, Urgent Care, ED, 911: Yes (9/16/2024  1:17 PM)    Wrap Up  Call End Time: 1318 (9/16/2024  1:17 PM)      Federica Otero RN [facility]  [phone]

## 2024-09-16 NOTE — DISCHARGE SUMMARY
Discharge Diagnosis  Pain of left hip    Issues Requiring Follow-Up  PT/OT [facility]    Test Results Pending At Discharge  Pending Labs       No current pending labs.            Hospital Course  Nataliia Pandya is a 59 y.o. female PMHx significant for COPD, HTN, HLD, diverticulitis, osteoarthritis, SLE, CHRISTIAN, chronic constipation, & GERDwho presented to [facility] ED due to worsening left hip pain and weakness. The patient reports that the symptoms began 4 days ago following a fall from her bed while turning over. She was evaluated in the ED on 9/7, where she was diagnosed with nephrolithiasis, which she believes has since passed after completing a full course of antibiotics. However, her hip pain has progressively worsened, exacerbated by ambulation and movement of the left leg, and currently rated as 8/10 in severity. The pain is relieved with rest. She denies any associated fever, chills, chest pain, dyspnea, syncope, loss of consciousness, upper respiratory symptoms, nausea, vomiting, abdominal pain, or changes in bowel habits.     Notably, the patient was discharged from [facility] ([facility]) yesterday after being evaluated for the same issue, at which time outpatient physical and occupational therapy was recommended. A femur X-ray taken at that time revealed no acute fractures.    Nataliia Pandya is a 59 y.o. female admitted to [facility] for management of:     Ambulatory dysfunction  Left hip pain 2/2 fall  - CT left hip with no acute findings  - PT/OT, fall precautions  - Pain control: Tylenol 975 mg q8h, Oxycodone 5 mg + Morphine 2 mg prn    Patient discharged home with order for home care PT/OT.    More than 30 minutes were spent in coordinating patient discharge.    Pertinent Physical Exam At Time of Discharge  Physical Exam  Vitals and nursing note reviewed.   Constitutional:       General: She is not in acute distress.     Appearance: She is obese.   HENT:      Head: Normocephalic and  atraumatic.      Mouth/Throat:      Mouth: Mucous membranes are moist.   Eyes:      Extraocular Movements: Extraocular movements intact.      Conjunctiva/sclera: Conjunctivae normal.   Cardiovascular:      Rate and Rhythm: Normal rate and regular rhythm.      Pulses: Normal pulses.      Heart sounds: Normal heart sounds.   Pulmonary:      Effort: Pulmonary effort is normal.      Breath sounds: Normal breath sounds.   Abdominal:      General: Abdomen is flat. Bowel sounds are normal.      Palpations: Abdomen is soft.   Musculoskeletal:         General: Tenderness present.   Skin:     General: Skin is warm.      Findings: No bruising.   Neurological:      Mental Status: She is alert and oriented to person, place, and time.      Motor: Weakness present.      Gait: Gait abnormal.   Psychiatric:         Mood and Affect: Mood normal.         Behavior: Behavior normal.         Thought Content: Thought content normal.         Judgment: Judgment normal.     Home Medications     Medication List      CONTINUE taking these medications     acetaminophen 500 mg tablet; Commonly known as: Tylenol   albuterol 90 mcg/actuation aerosol powdr breath activated inhaler   alendronate 70 mg tablet; Commonly known as: Fosamax   armodafinil 150 mg tablet; Commonly known as: Nuvigil   atenolol 25 mg tablet; Commonly known as: Tenormin   atorvastatin 40 mg tablet; Commonly known as: Lipitor   dicyclomine 20 mg tablet; Commonly known as: Bentyl   docusate sodium 100 mg capsule; Commonly known as: Colace   Dulera 50-5 mcg/actuation HFA aerosol inhaler inhaler; Generic drug:   mometasone-formoterol   ergocalciferol 1.25 MG (70287 UT) capsule; Commonly known as: Vitamin   D-2   famotidine 20 mg tablet; Commonly known as: Pepcid   fluticasone 50 mcg/actuation nasal spray; Commonly known as: Flonase   gabapentin 300 mg capsule; Commonly known as: Neurontin   ibuprofen 400 mg tablet   lisinopril 10 mg tablet   omeprazole 20 mg DR capsule; Commonly  known as: PriLOSEC   ondansetron ODT 4 mg disintegrating tablet; Commonly known as:   Zofran-ODT; Take 1 tablet (4 mg) by mouth every 8 hours if needed for   nausea or vomiting.   Oyster Shell Calcium-Vit D3 500 mg-5 mcg (200 unit) tablet; Generic   drug: calcium carbonate-vitamin D3   phenazopyridine 200 mg tablet; Commonly known as: Pyridium   Plenvu 140-9-5.2 gram powder in packet, sequential; Generic drug:   [id number] [id number]-KCl-[id number]-C   tamsulosin 0.4 mg 24 hr capsule; Commonly known as: Flomax   traMADol 50 mg tablet; Commonly known as: Ultram   triamcinolone 0.1 % cream; Commonly known as: Kenalog       Outpatient Follow-Up  No future appointments.    Franco Keita DO

## 2024-09-16 NOTE — TELEPHONE ENCOUNTER
Good morning,     Received a [facility] referral for SN/PT/OT for patient. I am not seeing the need for SN? Please amend HHC referral for PT/OT only. If there is a SN need please reply to telephone encounter.     Thank you,   Intake department

## 2024-09-16 NOTE — HH CARE COORDINATION
Home Care received a Referral for Physical Therapy and Occupational Therapy. We have processed the referral for a Start of Care on 09/17-09/18.     If you have any questions or concerns, please feel free to contact us at [phone]. Follow the prompts, enter your five digit zip code, and you will be directed to your care team on E[facility] 2.

## 2024-09-18 ENCOUNTER — HOME CARE VISIT (OUTPATIENT)
Dept: HOME HEALTH SERVICES | Facility: HOME HEALTH | Age: 59
End: 2024-09-18
Payer: COMMERCIAL

## 2024-09-18 PROCEDURE — G0152 HHCP-SERV OF OT,EA 15 MIN: HCPCS

## 2024-09-18 PROCEDURE — G0299 HHS/HOSPICE OF RN EA 15 MIN: HCPCS

## 2024-09-18 SDOH — ECONOMIC STABILITY: HOUSING INSECURITY: EVIDENCE OF SMOKING MATERIAL: 1

## 2024-09-18 SDOH — HEALTH STABILITY: MENTAL HEALTH: SMOKING IN HOME: 1

## 2024-09-18 ASSESSMENT — ENCOUNTER SYMPTOMS
PAIN LOCATION - PAIN QUALITY: ACHY
LOWEST PAIN SEVERITY IN PAST 24 HOURS: 1/10
HIGHEST PAIN SEVERITY IN PAST 24 HOURS: 10/10
SUBJECTIVE PAIN PROGRESSION: UNCHANGED
PAIN LOCATION - PAIN FREQUENCY: CONSTANT
PERSON REPORTING PAIN: PATIENT
PAIN: 1
PAIN LOCATION: LEFT HIP
PAIN LOCATION - PAIN SEVERITY: 5/10
PAIN SEVERITY GOAL: 0/10
PAIN LOCATION - RELIEVING FACTORS: REST, TYLENOL

## 2024-09-18 ASSESSMENT — ACTIVITIES OF DAILY LIVING (ADL)
CURRENT_FUNCTION: SUPERVISION
BATHING ASSESSED: 1
PHYSICAL TRANSFERS ASSESSED: 1
BATHING_CURRENT_FUNCTION: SUPERVISION
PHYSICAL_TRANSFERS_DEVICES: QUAD CANE
DRESSING_UB_CURRENT_FUNCTION: INDEPENDENT
DRESSING_LB_CURRENT_FUNCTION: INDEPENDENT
TOILETING: 1
GROOMING_CURRENT_FUNCTION: INDEPENDENT
TOILETING: INDEPENDENT
GROOMING ASSESSED: 1

## 2024-09-19 ENCOUNTER — HOME CARE VISIT (OUTPATIENT)
Dept: HOME HEALTH SERVICES | Facility: HOME HEALTH | Age: 59
End: 2024-09-19
Payer: COMMERCIAL

## 2024-09-19 VITALS
HEART RATE: 76 BPM | SYSTOLIC BLOOD PRESSURE: 130 MMHG | OXYGEN SATURATION: 98 % | RESPIRATION RATE: 18 BRPM | DIASTOLIC BLOOD PRESSURE: 71 MMHG | TEMPERATURE: 97.5 F

## 2024-09-19 PROCEDURE — G0151 HHCP-SERV OF PT,EA 15 MIN: HCPCS

## 2024-09-19 ASSESSMENT — ENCOUNTER SYMPTOMS
LOWEST PAIN SEVERITY IN PAST 24 HOURS: 0/10
SUBJECTIVE PAIN PROGRESSION: UNCHANGED
OCCASIONAL FEELINGS OF UNSTEADINESS: 1
PAIN: 1
PAIN LOCATION: LEFT HIP
HIGHEST PAIN SEVERITY IN PAST 24 HOURS: 10/10
PAIN SEVERITY GOAL: 0/10
PERSON REPORTING PAIN: PATIENT

## 2024-09-19 ASSESSMENT — ACTIVITIES OF DAILY LIVING (ADL)
AMBULATION ASSISTANCE ON FLAT SURFACES: 1
AMBULATION_DISTANCE/DURATION_TOLERATED: 35

## 2024-09-19 NOTE — CASE COMMUNICATION
Patient seen for PT evaluation seen s/p hospital stay with pt agreeable to PT services as jonathan POC. pt continues to have intermittent LLE hip pain resulting in limited mobility. pt goal to go to [facility] OP services after completion of home PT.

## 2024-09-20 VITALS
TEMPERATURE: 98.1 F | HEART RATE: 70 BPM | SYSTOLIC BLOOD PRESSURE: 120 MMHG | RESPIRATION RATE: 20 BRPM | BODY MASS INDEX: 39.85 KG/M2 | HEIGHT: 60 IN | DIASTOLIC BLOOD PRESSURE: 78 MMHG | WEIGHT: 203 LBS | OXYGEN SATURATION: 97 %

## 2024-09-20 ASSESSMENT — ENCOUNTER SYMPTOMS
PAIN SEVERITY GOAL: 0/10
OCCASIONAL FEELINGS OF UNSTEADINESS: 0
MUSCLE WEAKNESS: 1
APPETITE LEVEL: GOOD
PAIN LOCATION - PAIN QUALITY: SORE, ACHING, THROBBING
PAIN LOCATION - PAIN DURATION: CHRONIC
HIGHEST PAIN SEVERITY IN PAST 24 HOURS: 4/10
DEPRESSION: 0
FREQUENCY: 1
SHORTNESS OF BREATH: 1
LIMITED RANGE OF MOTION: 1
PAIN LOCATION - PAIN FREQUENCY: FREQUENT
LOWEST PAIN SEVERITY IN PAST 24 HOURS: 1/10
LOSS OF SENSATION IN FEET: 0
PAIN LOCATION: LEFT HIP
PAIN LOCATION - RELIEVING FACTORS: REST, TYLENOL
FATIGUES EASILY: 1
PAIN: 1
PERSON REPORTING PAIN: PATIENT
DYSPNEA ACTIVITY LEVEL: AFTER AMBULATING 10 - 20 FT
PAIN LOCATION - PAIN SEVERITY: 4/10
CHANGE IN APPETITE: UNCHANGED

## 2024-09-20 ASSESSMENT — ACTIVITIES OF DAILY LIVING (ADL)
TOILETING: 1
AMBULATION ASSISTANCE: TWO PERSON
CURRENT_FUNCTION: TWO PERSON
BATHING ASSESSED: 1
TOILETING: TWO PERSON
BATHING_CURRENT_FUNCTION: TWO PERSON
OASIS_M1830: 03
AMBULATION ASSISTANCE: 1
PHYSICAL TRANSFERS ASSESSED: 1
ENTERING_EXITING_HOME: TWO PERSON

## 2024-09-20 NOTE — CASE COMMUNICATION
Patient admitted to homecare at this time. Agreeable to skilled nursing, physical and occupational therapies.    She states no need for a nurse and that she came home from hospital only wanting to work with therapy, but after talking with her, she is agreeable to nurse getting her started, providing education, reviewing medications and disease teaching, symptom management.    So long as there are no further issues, nursing will discharg e at the next visit, and after patient gets started with therapy    Patient lives at home with her . He is her primary caregiver, and able to assist with her care in the home. He does have his own medical issues and disabilities, but is able to provide care.  He drives, patient does not. They also use [employer] transportation services from time to time.     patient's  receives meal assistance/delivery, but patient s tates she does not qualify due to age, and regardless of circumstance, but she does not wish for us to call and inquire.    no needs for social work.    patient uses walker or cane - mostly cane while in the home, when she leaves, she uses walker.  They have a ramp outside the home.  They have a walk in shower and shower chair if needed.    Patient states she has no falls, but does have unsteady gait.    The home is cluttered, swapnil, an d there are throw rugs present throughout. I discussed safety and infection control.    They also have animals in the home.    There is a small /narrow walking area from living area to other parts of the home.    Patient used to see pain management for her left hip, but she states they want to do cortisone injections which has worked before, but her insurance will not cover it, so she no longer goes there.    She has rosacea , which she  uses cream for.  She has scratches to her bilateral forearms from new puppy. no open areas. superficial scratches.    To therapy - once patient is done with homecare, she would like to  go to [facility] outpatient therapy in [place]. Please assist her wtih getting set up there.

## 2024-09-20 NOTE — CASE COMMUNICATION
The referring doctor is the only doctor listed here to contact. He doesnt wish to be contacted after the patient is referred to homecare.     Please add Dr. Sandip Schmitz in as attending so that we can have him notified if needed, as well as send the plan of care to the correct physician for signing.

## 2024-09-23 ENCOUNTER — HOME CARE VISIT (OUTPATIENT)
Dept: HOME HEALTH SERVICES | Facility: HOME HEALTH | Age: 59
End: 2024-09-23
Payer: COMMERCIAL

## 2024-09-23 PROCEDURE — G0299 HHS/HOSPICE OF RN EA 15 MIN: HCPCS

## 2024-09-24 ENCOUNTER — HOME CARE VISIT (OUTPATIENT)
Dept: HOME HEALTH SERVICES | Facility: HOME HEALTH | Age: 59
End: 2024-09-24
Payer: COMMERCIAL

## 2024-09-24 VITALS
DIASTOLIC BLOOD PRESSURE: 80 MMHG | HEART RATE: 72 BPM | TEMPERATURE: 97.5 F | OXYGEN SATURATION: 95 % | RESPIRATION RATE: 14 BRPM | SYSTOLIC BLOOD PRESSURE: 124 MMHG

## 2024-09-24 PROCEDURE — G0157 HHC PT ASSISTANT EA 15: HCPCS | Mod: CQ

## 2024-09-24 ASSESSMENT — ENCOUNTER SYMPTOMS
PAIN LOCATION - PAIN QUALITY: ACHING
MUSCLE WEAKNESS: 1
PAIN LOCATION - EXACERBATING FACTORS: ACTIVITY, POSITIONING, TIME OF DAY
PAIN LOCATION - PAIN QUALITY: ACHING
LIMITED RANGE OF MOTION: 1
PAIN LOCATION: LEFT HIP
PERSON REPORTING PAIN: PATIENT
PAIN LOCATION: BACK
PAIN LOCATION - PAIN SEVERITY: 6/10
PAIN LOCATION - PAIN FREQUENCY: FREQUENT
CHANGE IN APPETITE: UNCHANGED
PAIN LOCATION - PAIN FREQUENCY: FREQUENT
APPETITE LEVEL: GOOD
PAIN LOCATION - PAIN SEVERITY: 6/10
PAIN: 1
PAIN LOCATION - PAIN DURATION: CHRONIC
PAIN LOCATION - PAIN DURATION: CHRONIC

## 2024-09-24 ASSESSMENT — ACTIVITIES OF DAILY LIVING (ADL)
AMBULATION ASSISTANCE: TWO PERSON
CURRENT_FUNCTION: TWO PERSON

## 2024-09-24 NOTE — CASE COMMUNICATION
Patient discharged from skilled nursing as planned and requested by the patient, so that she can have therapy only in the home.    Unable to add Dr. Schmitz here, because he has yet to be added here by the office. Please add him here so that other disciplines can include him in communications. Thanks!    otherwise, I did not add the referring doctor listed here, because he is not following the patient for this homecare episode.

## 2024-09-26 ENCOUNTER — HOME CARE VISIT (OUTPATIENT)
Dept: HOME HEALTH SERVICES | Facility: HOME HEALTH | Age: 59
End: 2024-09-26
Payer: COMMERCIAL

## 2024-09-26 VITALS
HEART RATE: 76 BPM | DIASTOLIC BLOOD PRESSURE: 80 MMHG | RESPIRATION RATE: 14 BRPM | SYSTOLIC BLOOD PRESSURE: 126 MMHG | TEMPERATURE: 97.7 F

## 2024-09-26 PROCEDURE — G0157 HHC PT ASSISTANT EA 15: HCPCS | Mod: CQ

## 2024-09-26 ASSESSMENT — ENCOUNTER SYMPTOMS
PAIN LOCATION - PAIN FREQUENCY: FREQUENT
PAIN LOCATION - PAIN QUALITY: ACHING
PAIN LOCATION - RELIEVING FACTORS: TYLENOL
PAIN LOCATION: LEFT HIP
PAIN LOCATION - PAIN DURATION: CHRONIC
PAIN: 1
PAIN LOCATION - PAIN SEVERITY: 5/10
PAIN LOCATION - EXACERBATING FACTORS: ACTIVITY, TIME OF DAY
PERSON REPORTING PAIN: PATIENT

## 2024-09-27 ENCOUNTER — HOME CARE VISIT (OUTPATIENT)
Dept: HOME HEALTH SERVICES | Facility: HOME HEALTH | Age: 59
End: 2024-09-27
Payer: COMMERCIAL

## 2024-09-27 PROCEDURE — G0152 HHCP-SERV OF OT,EA 15 MIN: HCPCS

## 2024-09-27 ASSESSMENT — ENCOUNTER SYMPTOMS
PAIN LOCATION - PAIN QUALITY: ACHY
PAIN LOCATION - EXACERBATING FACTORS: OA
LOWEST PAIN SEVERITY IN PAST 24 HOURS: 2/10
PAIN LOCATION - RELIEVING FACTORS: REST, PAIN MEDS
SUBJECTIVE PAIN PROGRESSION: UNCHANGED
HIGHEST PAIN SEVERITY IN PAST 24 HOURS: 7/10
PAIN: 1
PAIN LOCATION - PAIN SEVERITY: 5/10
PERSON REPORTING PAIN: PATIENT
PAIN SEVERITY GOAL: 0/10
PAIN LOCATION: LEFT KNEE
PAIN LOCATION - PAIN FREQUENCY: INTERMITTENT

## 2024-10-01 ENCOUNTER — HOME CARE VISIT (OUTPATIENT)
Dept: HOME HEALTH SERVICES | Facility: HOME HEALTH | Age: 59
End: 2024-10-01
Payer: COMMERCIAL

## 2024-10-01 VITALS
SYSTOLIC BLOOD PRESSURE: 126 MMHG | OXYGEN SATURATION: 96 % | TEMPERATURE: 98 F | DIASTOLIC BLOOD PRESSURE: 80 MMHG | HEART RATE: 71 BPM | RESPIRATION RATE: 14 BRPM

## 2024-10-01 PROCEDURE — G0157 HHC PT ASSISTANT EA 15: HCPCS | Mod: CQ

## 2024-10-01 ASSESSMENT — ENCOUNTER SYMPTOMS
PAIN LOCATION - RELIEVING FACTORS: REST, MEDICATION
HIGHEST PAIN SEVERITY IN PAST 24 HOURS: 7/10
PAIN SEVERITY GOAL: 0/10
PAIN LOCATION: LEFT KNEE
PAIN LOCATION - PAIN SEVERITY: 5/10
LOWEST PAIN SEVERITY IN PAST 24 HOURS: 3/10
PAIN LOCATION - PAIN QUALITY: ACHING, THROBBING
PAIN LOCATION - EXACERBATING FACTORS: ACTIVITY, TIME OF DAY
PAIN LOCATION - PAIN DURATION: CHRONIC
PAIN: 1
PAIN LOCATION - PAIN FREQUENCY: FREQUENT
PERSON REPORTING PAIN: PATIENT

## 2024-10-01 NOTE — CASE COMMUNICATION
Patient feels ready to transition to outpatient therapy next week. At end of POC this week and is in agreement with plan to discharge from home PT this week and transition to outpatient therapy next week. NOMNC reviewed and signed electronically with end date of 10/4/2024.  Will call PCP office and request script for outpatient therapy be faxed to [facility]. Patient recommended to call clinic in 1-2 days to see if script has  been sent in and to set up first outpatient visit for sometime week of 10/7.

## 2024-10-03 ENCOUNTER — HOME CARE VISIT (OUTPATIENT)
Dept: HOME HEALTH SERVICES | Facility: HOME HEALTH | Age: 59
End: 2024-10-03
Payer: COMMERCIAL

## 2024-10-03 PROCEDURE — G0151 HHCP-SERV OF PT,EA 15 MIN: HCPCS

## 2024-10-03 SDOH — HEALTH STABILITY: PHYSICAL HEALTH: EXERCISE TYPE: HEP

## 2024-10-03 ASSESSMENT — ACTIVITIES OF DAILY LIVING (ADL)
HOME_HEALTH_OASIS: 00
AMBULATION ASSISTANCE ON FLAT SURFACES: 1
OASIS_M1830: 01

## 2024-10-03 ASSESSMENT — ENCOUNTER SYMPTOMS
PAIN: 1
HIGHEST PAIN SEVERITY IN PAST 24 HOURS: 5/10
LOWEST PAIN SEVERITY IN PAST 24 HOURS: 0/10
PAIN SEVERITY GOAL: 0/10
SUBJECTIVE PAIN PROGRESSION: UNCHANGED
PERSON REPORTING PAIN: PATIENT
PAIN LOCATION: LEFT LEG

## 2024-10-03 NOTE — CASE COMMUNICATION
patient discharged from PT services and home health services with pt starting op PT at [facility] next week. pt agreeable to dc at this time

## 2024-10-07 ENCOUNTER — PATIENT OUTREACH (OUTPATIENT)
Dept: CARE COORDINATION | Facility: CLINIC | Age: 59
End: 2024-10-07
Payer: COMMERCIAL

## 2024-10-07 NOTE — PROGRESS NOTES
Outreach call to fu on PCP visit after dc, spoke with the pt and she has had her fu appt. Pt had no other questions or concerns.   Federica Otero RN [facility] [place]  [phone]

## 2024-10-14 ENCOUNTER — PATIENT OUTREACH (OUTPATIENT)
Dept: CARE COORDINATION | Facility: CLINIC | Age: 59
End: 2024-10-14
Payer: COMMERCIAL

## 2024-10-14 NOTE — PROGRESS NOTES
Outreach call to patient to follow up after 30 days of hospital discharge. Spoke with the pt and she states she has everything she needs. Pt had no questions or concerns.   Federica Otero RN [facility] [place]  [phone]

## 2025-03-20 ENCOUNTER — APPOINTMENT (OUTPATIENT)
Dept: ORTHOPEDIC SURGERY | Facility: CLINIC | Age: 60
End: 2025-03-20
Payer: COMMERCIAL

## 2025-03-20 ENCOUNTER — HOSPITAL ENCOUNTER (OUTPATIENT)
Dept: RADIOLOGY | Facility: CLINIC | Age: 60
Discharge: HOME | End: 2025-03-20
Payer: COMMERCIAL

## 2025-03-20 VITALS — BODY MASS INDEX: 39.85 KG/M2 | WEIGHT: 203 LBS | HEIGHT: 60 IN

## 2025-03-20 DIAGNOSIS — M25.562 LEFT KNEE PAIN, UNSPECIFIED CHRONICITY: ICD-10-CM

## 2025-03-20 DIAGNOSIS — M25.562 LEFT KNEE PAIN, UNSPECIFIED CHRONICITY: Primary | ICD-10-CM

## 2025-03-20 PROCEDURE — 73564 X-RAY EXAM KNEE 4 OR MORE: CPT | Mod: LT

## 2025-03-20 PROCEDURE — 73562 X-RAY EXAM OF KNEE 3: CPT | Mod: RT

## 2025-03-20 PROCEDURE — 99205 OFFICE O/P NEW HI 60 MIN: CPT | Performed by: ORTHOPAEDIC SURGERY

## 2025-03-20 PROCEDURE — 3008F BODY MASS INDEX DOCD: CPT | Performed by: ORTHOPAEDIC SURGERY

## 2025-03-20 RX ORDER — CEFAZOLIN SODIUM 2 G/100ML
2 INJECTION, SOLUTION INTRAVENOUS ONCE
OUTPATIENT
Start: 2025-03-20 | End: 2025-03-20

## 2025-03-20 RX ORDER — CELECOXIB 400 MG/1
400 CAPSULE ORAL ONCE
OUTPATIENT
Start: 2025-03-20 | End: 2025-03-20

## 2025-03-20 RX ORDER — ACETAMINOPHEN 325 MG/1
975 TABLET ORAL ONCE
OUTPATIENT
Start: 2025-03-20 | End: 2025-03-20

## 2025-03-20 ASSESSMENT — PAIN - FUNCTIONAL ASSESSMENT: PAIN_FUNCTIONAL_ASSESSMENT: 0-10

## 2025-03-20 ASSESSMENT — PAIN SCALES - GENERAL: PAINLEVEL_OUTOF10: 5 - MODERATE PAIN

## 2025-03-20 NOTE — PROGRESS NOTES
This is a consultation from Dr. Andrea primary care provider on file. for   Chief Complaint   Patient presents with    Left Knee - Pain       This is a 59 y.o. female who presents for evaluation of left knee pain.  Patient states has had left knee pain for many years, this is a longstanding chronic problem for her to get worse over the years.  It has been severely exacerbated last few weeks, deep sharp stabbing pain over the anterior medial knee.  Very difficult for her to stand on it and walk.  She has had an extensive trial of nonsurgical management occluding use of anti-inflammatories physical therapy activity modification use of assistive devices cortisone injections.  Despite that she has severe and worsening pain which Baker quality of life and activities of daily living she is almost entirely dependent on a walker to get around.  Left knee exam: skin intact no lacerations or abrations. no effusion. nttp joint line. negative log roll negative patellar grind. ROM 0-120. stable to varus and valgus stress at 0 and 30 degrees. negative lachman negative posterior drawer negative checo. 5/5 ehl/fhl/gs/ta. silt s/s/sp/dp/t. 2+ dp/pt        Physical Exam    There has been no interval change in this patient's past medical, surgical, medications, allergies, family history or social history since the most recent visit to a provider within our department. 14 point review of systems was performed, reviewed, and negative except for pertinent positives documented in the history of present illness.     Constitutional: well developed, well nourished female in no acute distress  Psychiatric: normal mood, appropriate affect  Eyes: sclera anicteric  HENT: normocephalic/atraumatic  CV: regular rate and rhythm   Respiratory: non labored breathing  Integumentary: no rash  Neurological: moves all extremities    Left knee exam: skin intact no lacerations or abrations.  1+ effusion.  Tender medial joint line. negative log roll negative  patellar grind. ROM 5-100, slight varus deformity. stable to varus and valgus stress at 0 and 30 degrees. negative lachman negative posterior drawer negative checo. 5/5 ehl/fhl/gs/ta. silt s/s/sp/dp/t. 2+ dp/pt        Imaging:  Xrays were ordered by me, they were reviewed and independently interpreted by me today, they show severe degenerative disease left knee bone-on-bone arthritis subchondral sclerosis osteophyte formation Kellgren-Jeff grade 4.  X-rays from today were reviewed, I reviewed the AP lateral PA flexion and sunrise views.    Procedures      Impression/Plan: This is a 59 y.o. 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  Patient elected proceed with left total knee.    I had an extensive discussion with the patient regarding her condition and possible treatment options. Nonsurgical treatment for left knee arthritis includes activity modification, weight loss, use of a cane or other assistive device, pain medications and nonsteroidal anti-inflammatory medications, joint injections, and physical therapy. The patient has attempted non-surgical treatment for this condition during the past year for greater than 6 months and it has failed. We discussed the risks benefits and alternatives of total knee arthroplasty. Benefits of joint replacement include: Relief of pain, improvement of function, and improved quality of life. Alternatives include observation and watchful waiting, and the nonsurgical modalities noted above.     Discussed with the patient that during total knee arthroplasty prosthetic resurfacing of the patella may or may not be performed at the discretion of thesurgeon during surgery. In the event that prosthetic patellar resurfacing is not performed, nonprosthetic arthroplasty will be performed with removal of bone spurs, circumferential synovectomy and electrocautery. Patient was informed that anterior knee pain and  patellofemoral crepitus can potentially occur after knee surgery with or without prosthetic patellar resurfacing.     One specific issue for this patient is smoking. Smoking increases the risk of complications including wound healing and infection. We discussed that reducing smoking will mitigate these risks and that smoking cessation is recommended. We discussed strategies for smoking cessation and that the patient should discuss with her primary care physician strategies for additional assistance with smoking cessation. The patient is aware of the risks and would still like to proceed.        One specific concern for this patient is their obesity.  The patient has an elevated BMI which places them at increased risk of complications related to total joint surgery especially infection. The patient has attempted to lose weight by various methods and has had limited success, but is still trying .We discussed a weight loss program and recommended the patient make an appointment with our comprehensive weight loss clinic. The patient is aware of this increased risk, still like to proceed        We discussed the risks of complications as well as the risks of morbidity and mortality related to surgical treatment with total joint replacement. We reviewed the fact that total joint replacement is major elective surgery with significant associated surgical and procedural risk factors as detailed below.   Risks include: Pain, blood loss, damage to nearby anatomical structures including but not limited to nerves or blood vessels muscles tendons and bone, failure surgery to ameliorate symptoms, persistence of pain surrounding the affected joint, mechanical failure of the prosthesis including but not limited to loosening of the prosthesis from bone ,breakage of the prosthesis and dislocation of the prosthesis, change in length or appearance of a limb, infection possibly necessitating further surgery, removal of the prosthesis, or  "limb amputation, the need for additional surgery for other reasons, blood clots, amputation, and death. No guarantees were implied or given.  All questions were answered and the patient voiced their understanding.     A complete set of surgical instructions was given to the patient. The patient was educated regarding preoperative nutrition, preparation of their home for postoperative rehabilitation, choosing a care partner, medical and dental clearance, the cessation of medications that can cause bleeding or presenting to risk for infection, chlorhexidine bath, nasal swab and decontamination, post operative follow up, and need for medical equipment. Patient was also educated regarding the possibility of same day surgery and related criteria and protocols. The patient will attend our joint class further education, and thereafter they will return for a preoperative visit. A presurgery education booklet was also given to the patient.     surgical plan: Left total knee  implants: DePuy  approach: Standard aspirin  DVT ppx aspirin  drugs to stop none  allergy to abx none  post operative abx: ancef +/- vanc (per protocol)  special clearance needed cardiology    BMI Readings from Last 1 Encounters:   03/20/25 39.65 kg/m²      Lab Results   Component Value Date    CREATININE 0.64 09/14/2024     Tobacco Use: High Risk (3/20/2025)    Patient History     Smoking Tobacco Use: Every Day     Smokeless Tobacco Use: Never     Passive Exposure: Not on file      Computed MELD 3.0 unavailable. One or more values for this score either were not found within the given timeframe or did not fit some other criterion.  Computed MELD-Na unavailable. One or more values for this score either were not found within the given timeframe or did not fit some other criterion.       No results found for: \"HGBA1C\"  No results found for: \"STAPHMRSASCR\"  "

## 2025-03-28 ENCOUNTER — TELEPHONE (OUTPATIENT)
Dept: ORTHOPEDIC SURGERY | Facility: CLINIC | Age: 60
End: 2025-03-28
Payer: COMMERCIAL

## 2025-03-28 NOTE — TELEPHONE ENCOUNTER
UPCOMING LT TKA 05/19/25  Patient called and stated that she currently has catheter in and she was wondering if this safe for her surgery , she also wants to see if its possible to move her joint class because she doesn't think she will be able to make it because of the catheter.

## 2025-04-17 ENCOUNTER — APPOINTMENT (OUTPATIENT)
Dept: ORTHOPEDIC SURGERY | Facility: CLINIC | Age: 60
End: 2025-04-17
Payer: COMMERCIAL

## 2025-04-17 NOTE — PREADMISSION SCREENING NOTE
"Pre Surgery Discharge Planning :    Procedure: Left knee replacement      Date of procedure: 5/19/2025      Address you will be discharged to:  [address] [place] [id number]     Care Partner:  Clint ()    Current mobility status: no mobility aides used       Stairs into house: \"Handicap ramp\"    Stairs inside house:     DME: 4 prong cane, rolator      Joint Class: 4/17/2025      Same Day Surgery: Yes          "

## 2025-04-27 NOTE — PROGRESS NOTES
HISTORY OF PRESENT ILLNESS:  Nataliia Pandya is a 59 y.o. female who presents today as a new patient. She was seeing someone at the clinic for her urinary retention. She is bothered by her urinary symptoms. She can feel that she does not fully empty her bladder and she has urinary incontinence as well. She does have urgency and frequency. She denies any issues with her bowels. She does not have a history of breast cancer.          Past Medical History  She has no past medical history on file.    Surgical History  She has no past surgical history on file.     Social History  She reports that she has been smoking cigarettes. She has never used smokeless tobacco. She reports that she does not use drugs. No history on file for alcohol use.    Family History  Family History[1]     Allergies  Patient has no known allergies.      A comprehensive 10+ review of systems was negative except for: see hpi                          PHYSICAL EXAMINATION:  BP Readings from Last 3 Encounters:   10/01/24 126/80   09/26/24 126/80   09/24/24 124/80      Wt Readings from Last 3 Encounters:   03/20/25 92.1 kg (203 lb)   09/18/24 92.1 kg (203 lb)   09/13/24 92.7 kg (204 lb 5.9 oz)      BMI: Estimated body mass index is 39.65 kg/m² as calculated from the following:    Height as of 3/20/25: 1.524 m (5').    Weight as of 3/20/25: 92.1 kg (203 lb).  BSA: Estimated body surface area is 1.97 meters squared as calculated from the following:    Height as of 3/20/25: 1.524 m (5').    Weight as of 3/20/25: 92.1 kg (203 lb).  HEENT: Normocephalic, atraumatic, PER EOMI, nonicteric, trachea normal, thyroid normal, oropharynx normal.  CARDIAC: regular rate & rhythm, S1 & S2 normal.  No heaves, thrills, gallops or murmurs.  LUNGS: Clear to auscultation, no spinal or CV tenderness.  EXTREMITIES: No evidence of cyanosis, clubbing or edema.        Pelvic:  Chaperone for pelvic exam:   Genitourinary:  normal external genitalia, Bartholin's glands, Maricao's  glands negative,   Urethra normal meatus, non-tender, no periurethral mass  Vaginal mucosa  normal  Adnexae  negative nontender, no masses  Atrophy positive    CST negative  Pelvic floor muscle contraction  4/5    POP-Q (in supine position):       Aa -1     Ba -1     C -6              gh 3     pb 3     tvl 8              Ap -2     Bp -2     D     Rectal: no hemorrhoids, fissures or masses.      Assessment:  59 y.o. female presents as a new patient with prolapse, mixed urinary incontinence, possible UTI. Hx of hysterectomy        Prolapse:  -Not bothered by this currently       LEVON:  -discussed mechanism of UUI and GORDO, and treatment options for both including PFT, pessary, sling for GORDO and PFT, pharmacotherapy and third-line therapy for OAB      GORDO:  We discussed the sling procedure in depth with her there is a long-term success rate of 70-80% complete continence and up to 90% significant improvement up to 10 years after surgery, though the sling is meant to last a lifetime, there is a <5% risk of subsequent surgery, either revision or excision within 9 years. The major complications include bladder perforation with sling placement <1%, retention requiring sling lysis 1-3%, transient retention requiring 2-3 days of catheter drainage 33%, and mesh erosion 1-3%.     Bulkamid is associated with slightly less success rate of a sling about 60 to 70% of women having >90% improvement. However, there seems to be similar long-term success compared to sling with fewer side-effects. Main AE is urinary retention which resolves within 24 hours of using a 10-12 Maltese catheter.  I discussed that if she still has some leakage after her procedure, she could perform another injection within 4 weeks and this procedure being performed in the office.     UUI:  we discussed botox vs sacral neuromodulation: both have similar efficacy 80% patients reports >50% improvement, botox associated with 5% risk of incomplete emptying, increase  in UTI and will require re-injection in 6-9 months; and as early as 3 months. SNM is a staged procedure, 2 weeks apart, consisting first of lead implantation then internalization of IPG if there is improvement. Interstim is associated with lead migration, explantation, infection and bleeding, though risks are all <5%. We also discussed PTNS which is associated with success rates comparable to medical therapy but without side-effects without significant major morbidity.     UDS ordered      UTI:  -Standing urine culture  -Rx estradiol and methenamine      Informational handouts provided       Follow up after UDS       All questions and concerns were answered and addressed.  The patient expressed understanding and agrees with the plan.     Chuy Reeves MD    Scribe Attestation  By signing my name below, I, Katiuska Loja, Scrib[employer]   attest that this documentation has been prepared under the direction and in the presence of Chuy Reeves MD.         [1] No family history on file.

## 2025-04-28 ENCOUNTER — APPOINTMENT (OUTPATIENT)
Dept: UROLOGY | Facility: CLINIC | Age: 60
End: 2025-04-28
Payer: COMMERCIAL

## 2025-04-28 DIAGNOSIS — N39.0 RECURRENT UTI: Primary | ICD-10-CM

## 2025-04-28 DIAGNOSIS — N32.81 OAB (OVERACTIVE BLADDER): ICD-10-CM

## 2025-04-28 DIAGNOSIS — N39.3 SUI (STRESS URINARY INCONTINENCE, FEMALE): ICD-10-CM

## 2025-04-28 LAB
POC APPEARANCE, URINE: CLEAR
POC BILIRUBIN, URINE: NEGATIVE
POC BLOOD, URINE: ABNORMAL
POC COLOR, URINE: YELLOW
POC GLUCOSE, URINE: NEGATIVE MG/DL
POC KETONES, URINE: NEGATIVE MG/DL
POC LEUKOCYTES, URINE: NEGATIVE
POC NITRITE,URINE: NEGATIVE
POC PH, URINE: 6 PH
POC PROTEIN, URINE: NEGATIVE MG/DL
POC SPECIFIC GRAVITY, URINE: 1.01
POC UROBILINOGEN, URINE: 0.2 EU/DL

## 2025-04-28 PROCEDURE — 99204 OFFICE O/P NEW MOD 45 MIN: CPT | Performed by: STUDENT IN AN ORGANIZED HEALTH CARE EDUCATION/TRAINING PROGRAM

## 2025-04-28 RX ORDER — METHENAMINE HIPPURATE 1000 MG/1
1 TABLET ORAL 2 TIMES DAILY
Qty: 180 TABLET | Refills: 3 | Status: SHIPPED | OUTPATIENT
Start: 2025-04-28 | End: 2026-04-28

## 2025-04-28 RX ORDER — ESTRADIOL 0.1 MG/G
CREAM VAGINAL
Qty: 42.5 G | Refills: 12 | Status: SHIPPED | OUTPATIENT
Start: 2025-04-28

## 2025-04-29 ENCOUNTER — TELEPHONE (OUTPATIENT)
Dept: ORTHOPEDIC SURGERY | Facility: CLINIC | Age: 60
End: 2025-04-29
Payer: COMMERCIAL

## 2025-04-29 DIAGNOSIS — N32.81 OAB (OVERACTIVE BLADDER): ICD-10-CM

## 2025-04-29 DIAGNOSIS — N39.3 SUI (STRESS URINARY INCONTINENCE, FEMALE): ICD-10-CM

## 2025-04-29 NOTE — TELEPHONE ENCOUNTER
UPCOMING LT TKA 5-19-25      Rama from  office left a voicemail that stated the patient is mutual and they were wondering if we needed a pre op from them.    [phone]

## 2025-04-30 DIAGNOSIS — N39.3 SUI (STRESS URINARY INCONTINENCE, FEMALE): ICD-10-CM

## 2025-04-30 DIAGNOSIS — N32.81 OAB (OVERACTIVE BLADDER): ICD-10-CM

## 2025-05-01 DIAGNOSIS — N32.81 OAB (OVERACTIVE BLADDER): ICD-10-CM

## 2025-05-01 DIAGNOSIS — N39.3 SUI (STRESS URINARY INCONTINENCE, FEMALE): ICD-10-CM

## 2025-05-02 DIAGNOSIS — N32.81 OAB (OVERACTIVE BLADDER): ICD-10-CM

## 2025-05-02 DIAGNOSIS — N39.3 SUI (STRESS URINARY INCONTINENCE, FEMALE): ICD-10-CM

## 2025-05-05 ENCOUNTER — PRE-ADMISSION TESTING (OUTPATIENT)
Dept: PREADMISSION TESTING | Facility: HOSPITAL | Age: 60
End: 2025-05-05
Payer: COMMERCIAL

## 2025-05-05 ENCOUNTER — HOSPITAL ENCOUNTER (OUTPATIENT)
Dept: RADIOLOGY | Facility: HOSPITAL | Age: 60
Discharge: HOME | End: 2025-05-05
Payer: COMMERCIAL

## 2025-05-05 VITALS
HEIGHT: 60 IN | RESPIRATION RATE: 18 BRPM | TEMPERATURE: 97.5 F | BODY MASS INDEX: 40.51 KG/M2 | HEART RATE: 80 BPM | OXYGEN SATURATION: 95 % | DIASTOLIC BLOOD PRESSURE: 73 MMHG | SYSTOLIC BLOOD PRESSURE: 120 MMHG | WEIGHT: 206.35 LBS

## 2025-05-05 DIAGNOSIS — M25.562 PAIN IN LEFT KNEE: ICD-10-CM

## 2025-05-05 DIAGNOSIS — M25.562 LEFT KNEE PAIN, UNSPECIFIED CHRONICITY: ICD-10-CM

## 2025-05-05 DIAGNOSIS — Z01.818 PRE-OPERATIVE EXAM: Primary | ICD-10-CM

## 2025-05-05 DIAGNOSIS — N32.81 OAB (OVERACTIVE BLADDER): ICD-10-CM

## 2025-05-05 DIAGNOSIS — N39.3 SUI (STRESS URINARY INCONTINENCE, FEMALE): ICD-10-CM

## 2025-05-05 LAB
ALBUMIN SERPL BCP-MCNC: 4.3 G/DL (ref 3.4–5)
ALP SERPL-CCNC: 52 U/L (ref 33–136)
ALT SERPL W P-5'-P-CCNC: 20 U/L (ref 7–45)
ANION GAP SERPL CALC-SCNC: 12 MMOL/L (ref 10–20)
AST SERPL W P-5'-P-CCNC: 16 U/L (ref 9–39)
BILIRUB SERPL-MCNC: 0.5 MG/DL (ref 0–1.2)
BUN SERPL-MCNC: 19 MG/DL (ref 6–23)
CALCIUM SERPL-MCNC: 9.2 MG/DL (ref 8.6–10.3)
CHLORIDE SERPL-SCNC: 102 MMOL/L (ref 98–107)
CO2 SERPL-SCNC: 25 MMOL/L (ref 21–32)
CREAT SERPL-MCNC: 0.72 MG/DL (ref 0.5–1.05)
EGFRCR SERPLBLD CKD-EPI 2021: >90 ML/MIN/1.73M*2
ERYTHROCYTE [DISTWIDTH] IN BLOOD BY AUTOMATED COUNT: 13.9 % (ref 11.5–14.5)
GLUCOSE SERPL-MCNC: 88 MG/DL (ref 74–99)
HCT VFR BLD AUTO: 43.2 % (ref 36–46)
HGB BLD-MCNC: 14.8 G/DL (ref 12–16)
MCH RBC QN AUTO: 33.2 PG (ref 26–34)
MCHC RBC AUTO-ENTMCNC: 34.3 G/DL (ref 32–36)
MCV RBC AUTO: 97 FL (ref 80–100)
NRBC BLD-RTO: 0 /100 WBCS (ref 0–0)
PLATELET # BLD AUTO: 174 X10*3/UL (ref 150–450)
POTASSIUM SERPL-SCNC: 4.1 MMOL/L (ref 3.5–5.3)
PROT SERPL-MCNC: 6.8 G/DL (ref 6.4–8.2)
RBC # BLD AUTO: 4.46 X10*6/UL (ref 4–5.2)
SODIUM SERPL-SCNC: 135 MMOL/L (ref 136–145)
WBC # BLD AUTO: 7.8 X10*3/UL (ref 4.4–11.3)

## 2025-05-05 PROCEDURE — 87081 CULTURE SCREEN ONLY: CPT | Mod: GEALAB

## 2025-05-05 PROCEDURE — 85027 COMPLETE CBC AUTOMATED: CPT

## 2025-05-05 PROCEDURE — 80053 COMPREHEN METABOLIC PANEL: CPT

## 2025-05-05 PROCEDURE — 77073 BONE LENGTH STUDIES: CPT

## 2025-05-05 PROCEDURE — 77073 BONE LENGTH STUDIES: CPT | Performed by: RADIOLOGY

## 2025-05-05 PROCEDURE — 36415 COLL VENOUS BLD VENIPUNCTURE: CPT

## 2025-05-05 PROCEDURE — 83036 HEMOGLOBIN GLYCOSYLATED A1C: CPT | Mod: GEALAB

## 2025-05-05 RX ORDER — HYDROCHLOROTHIAZIDE 25 MG/1
25 TABLET ORAL DAILY
COMMUNITY

## 2025-05-05 RX ORDER — DOXYCYCLINE 100 MG/1
100 CAPSULE ORAL DAILY
COMMUNITY

## 2025-05-05 RX ORDER — CHLORHEXIDINE GLUCONATE ORAL RINSE 1.2 MG/ML
15 SOLUTION DENTAL DAILY
Qty: 120 ML | Refills: 0 | Status: SHIPPED | OUTPATIENT
Start: 2025-05-05 | End: 2025-05-07

## 2025-05-05 ASSESSMENT — ENCOUNTER SYMPTOMS
CONSTITUTIONAL NEGATIVE: 1
NEUROLOGICAL NEGATIVE: 1
ARTHRALGIAS: 1
DYSPNEA WITH EXERTION: 1
ENDOCRINE NEGATIVE: 1
LIMITED RANGE OF MOTION: 1
GASTROINTESTINAL NEGATIVE: 1
RESPIRATORY NEGATIVE: 1
NECK NEGATIVE: 1

## 2025-05-05 ASSESSMENT — DUKE ACTIVITY SCORE INDEX (DASI)
CAN YOU PARTICIPATE IN STRENOUS SPORTS LIKE SWIMMING, SINGLES TENNIS, FOOTBALL, BASKETBALL, OR SKIING: NO
CAN YOU DO LIGHT WORK AROUND THE HOUSE LIKE DUSTING OR WASHING DISHES: YES
CAN YOU HAVE SEXUAL RELATIONS: YES
CAN YOU WALK INDOORS, SUCH AS AROUND YOUR HOUSE: YES
DASI METS SCORE: 5
CAN YOU TAKE CARE OF YOURSELF (EAT, DRESS, BATHE, OR USE TOILET): YES
CAN YOU DO MODERATE WORK AROUND THE HOUSE LIKE VACUUMING, SWEEPING FLOORS OR CARRYING GROCERIES: YES
CAN YOU WALK A BLOCK OR TWO ON LEVEL GROUND: YES
CAN YOU PARTICIPATE IN MODERATE RECREATIONAL ACTIVITIES LIKE GOLF, BOWLING, DANCING, DOUBLES TENNIS OR THROWING A BASEBALL OR FOOTBALL: NO
CAN YOU DO YARD WORK LIKE RAKING LEAVES, WEEDING OR PUSHING A MOWER: NO
CAN YOU DO HEAVY WORK AROUND THE HOUSE LIKE SCRUBBING FLOORS OR LIFTING AND MOVING HEAVY FURNITURE: NO
CAN YOU CLIMB A FLIGHT OF STAIRS OR WALK UP A HILL: NO
CAN YOU RUN A SHORT DISTANCE: NO
TOTAL_SCORE: 18.7

## 2025-05-05 ASSESSMENT — LIFESTYLE VARIABLES: SMOKING_STATUS: SMOKER

## 2025-05-05 ASSESSMENT — PAIN - FUNCTIONAL ASSESSMENT: PAIN_FUNCTIONAL_ASSESSMENT: 0-10

## 2025-05-05 ASSESSMENT — PAIN SCALES - GENERAL: PAINLEVEL_OUTOF10: 5 - MODERATE PAIN

## 2025-05-05 NOTE — PREPROCEDURE INSTRUCTIONS
Medication List            Accurate as of May 5, 2025  1:35 PM. Always use your most recent med list.                acetaminophen 500 mg tablet  Commonly known as: Tylenol  Medication Adjustments for Surgery: Take/Use as prescribed     albuterol 90 mcg/actuation aerosol powdr breath activated inhaler  Medication Adjustments for Surgery: Take/Use as prescribed     alendronate 70 mg tablet  Commonly known as: Fosamax  Medication Adjustments for Surgery: Do Not take on the morning of surgery     armodafinil 150 mg tablet  Commonly known as: Nuvigil  Additional Medication Adjustments for Surgery: Take last dose 7 days before surgery     atenolol 25 mg tablet  Commonly known as: Tenormin  Medication Adjustments for Surgery: Take/Use as prescribed     atorvastatin 40 mg tablet  Commonly known as: Lipitor  Medication Adjustments for Surgery: Take/Use as prescribed     chlorhexidine 0.12 % solution  Commonly known as: Peridex  Use 15 mL in the mouth or throat once daily for 2 days. Use 15ml once the night before surgery and 15ml once the morning of surgery  Medication Adjustments for Surgery: Take/Use as prescribed     dicyclomine 20 mg tablet  Commonly known as: Bentyl  Medication Adjustments for Surgery: Do Not take on the morning of surgery     docusate sodium 100 mg capsule  Commonly known as: Colace  Medication Adjustments for Surgery: Do Not take on the morning of surgery     doxycycline 100 mg capsule  Commonly known as: Monodox  Medication Adjustments for Surgery: Take/Use as prescribed     Dulera 50-5 mcg/actuation HFA aerosol inhaler inhaler  Generic drug: mometasone-formoterol  Medication Adjustments for Surgery: Take/Use as prescribed     ergocalciferol 1250 mcg (50,000 units) capsule  Commonly known as: Vitamin D-2  Additional Medication Adjustments for Surgery: Take last dose 7 days before surgery     estradiol 0.01 % (0.1 mg/gram) vaginal cream  Commonly known as: Estrace  Insert pea size amount into  vagina every night for 2 weeks, then 2x/week after  Medication Adjustments for Surgery: Do Not take on the morning of surgery     famotidine 20 mg tablet  Commonly known as: Pepcid  Medication Adjustments for Surgery: Take/Use as prescribed     fluticasone 50 mcg/actuation nasal spray  Commonly known as: Flonase  Medication Adjustments for Surgery: Take/Use as prescribed     gabapentin 300 mg capsule  Commonly known as: Neurontin  Medication Adjustments for Surgery: Take/Use as prescribed     hydroCHLOROthiazide 25 mg tablet  Commonly known as: HYDRODiuril  Medication Adjustments for Surgery: Take/Use as prescribed     ibuprofen 400 mg tablet  Additional Medication Adjustments for Surgery: Take last dose 7 days before surgery     lisinopril 10 mg tablet  Medication Adjustments for Surgery: Do Not take on the morning of surgery     methenamine hippurate 1 gram tablet  Commonly known as: Hiprex  Take 1 tablet (1 g) by mouth 2 times a day.  Medication Adjustments for Surgery: Take/Use as prescribed     omeprazole 20 mg DR capsule  Commonly known as: PriLOSEC  Medication Adjustments for Surgery: Take/Use as prescribed     ondansetron ODT 4 mg disintegrating tablet  Commonly known as: Zofran-ODT  Take 1 tablet (4 mg) by mouth every 8 hours if needed for nausea or vomiting.  Medication Adjustments for Surgery: Take/Use as prescribed     Oyster Shell Calcium-Vit D3 500 mg-5 mcg (200 unit) tablet  Generic drug: calcium carbonate-vitamin D3  Additional Medication Adjustments for Surgery: Take last dose 7 days before surgery     phenazopyridine 200 mg tablet  Commonly known as: Pyridium  Medication Adjustments for Surgery: Do Not take on the morning of surgery     Plenvu 140-9-5.2 gram powder  Generic drug: [id number]  Medication Adjustments for Surgery: Do Not take on the morning of surgery     tamsulosin 0.4 mg 24 hr capsule  Commonly known as: Flomax  Medication Adjustments for Surgery: Take/Use as  prescribed     traMADol 50 mg tablet  Commonly known as: Ultram  Medication Adjustments for Surgery: Take/Use as prescribed     triamcinolone 0.1 % cream  Commonly known as: Kenalog                                  Thank you for visiting [facility]) today for your pre-procedure evaluation, you were seen by     VALERIE Hunt  Pre Admission Testing  [facility]  [phone]    This summary includes instructions and information to aid you during your perioperative period.  Please read carefully. If you have any questions about your visit today, please call the number listed above.  If you become ill or have any changes to your health before your surgery, please contact your primary care provider and alert your surgeon.    Preparing for your Surgery       Exercises  Preoperative Deep Breathing Exercises  Why it is important to do deep breathing exercises before my surgery?  Deep breathing exercises strengthen your breathing muscles.  This helps you to recover after your surgery and decreases the chance of breathing complications.  How are the deep breathing exercises done?  Sit straight with your back supported.  Breathe in deeply and slowly through your nose. Your lower rib cage should expand and your abdomen may move forward.  Hold that breath for 3 to 5 seconds.  Breathe out through pursed lips, slowly and completely.  Rest and repeat 10 times every hour while awake.  Rest longer if you become dizzy or lightheaded.       Preoperative Brain Exercises    What are brain exercises?  A brain exercise is any activity that engages your thinking (cognitive) skills.    What types of activities are considered brain exercises?  Jigsaw puzzles, crossword puzzles, word jumble, memory games, word search, and many more.  Many can be found free online or on your phone via a mobile ashtyn.    Why should I do brain exercises before my surgery?  More recent research has shown brain exercise  before surgery can lower the risk of postoperative delirium (confusion) which can be especially important for older adults.  Patients who did brain exercises for 5 to 10 hours the days before surgery, cut their risk of postoperative delirium in half up to 1 week after surgery.    Sit-to-Stand Exercise    What is the sit-to-stand exercise?  The sit-to-stand exercise strengthens the muscles of your lower body and muscles in the center of your body (core muscles for stability) helping to maintain and improve your strength and mobility.  How do I do the sit-to-stand exercise?  The goal is to do this exercise without using your arms or hands.  If this is too difficult, use your arms and hands or a chair with armrests to help slowly push yourself to the standing position and lower yourself back to the sitting position. As the movement becomes easier use your arms and hands less.    Steps to the sit-to-stand exercise  Sit up tall in a sturdy chair, knees bent, feet flat on the floor shoulder-width apart.  Shift your hips/pelvis forward in the chair to correctly position yourself for the next movement.  Lean forward at your hips.  Stand up straight putting equal weight on both feet.  Check to be sure you are properly aligned with the chair, in a slow controlled movement sit back down.  Repeat this exercise 10-15 times.  If needed you can do it fewer times until your strength improves.  Rest for 1 minute.  Do another 10-15 sit-to-stand exercises.  Try to do this in the morning and evening.        Instructions    Preoperative Fasting Guidelines    Why must I stop eating and drinking near surgery time?  With sedation, food or liquid in your stomach can enter your lungs causing serious complications  Food can increase nausea and vomiting  When do I need to stop eating and drinking before my surgery?      Do not eat any food after midnight the night before your surgery/procedure. You may have up to 13.5 ounces of clear liquid  until TWO hours before your instructed arrival time to the hospital.  This includes water, black tea/coffee, (no milk or cream) apple juice, and electrolyte drinks (Gatorade). You may chew gum until TWO hours before your surgery/procedure            Simple things you can do to help prevent blood clots     Blood clots are blockages that can form in the body's veins. When a blood clot forms in your deep veins, it may be called a deep vein thrombosis, or DVT for short. Blood clots can happen in any part of the body where blood flows, but they are most common in the arms and legs. If a piece of a blood clot breaks free and travels to the lungs, it is called a pulmonary embolus (PE). A PE can be a very serious problem.         Being in the hospital or having surgery can raise your chances of getting a blood clot because you may not be well enough to move around as much as you normally do.         Ways you can help prevent blood clots in the hospital       Wearing SCDs  SCDs stands for Sequential Compression Devices.   SCDs are special sleeves that wrap around your legs. They attach to a pump that fills them with air to gently squeeze your legs every few minutes.  This helps return the blood in your legs to your heart.   SCDs should only be taken off when walking or bathing. SCDs may not be comfortable, but they can help save your life.              Pump SCD leg sleeves  Wearing compression stockings - if your doctor orders them. These special snug-fitting stockings gently squeeze your legs to help blood flow.       Walking. Walking helps move the blood in your legs.   If your doctor says it is ok, try walking the halls at least   5 times a day. Ask us to help you get up, so you don't fall.      Taking any blood-thinning medicines your doctor orders.              Ways you can help prevent blood clots at home         Wearing compression stockings - if your doctor orders them.   Walking - to help move the blood in your  legs.    Taking any blood-thinning medicines your doctor orders.      Signs of a blood clot or PE    Tell your doctor or nurse right away if you have any of the problems listed below.         If you are at home, seek medical care right away. Call 911 for chest pain or problems breathing.            Signs of a blood clot (DVT) - such as pain, swelling, redness, or warmth in your arm or legs.  Signs of a pulmonary embolism (PE) - such as chest pain or feeling short of breath      Tobacco and Alcohol;  Do not drink alcohol or smoke within 24 hours of surgery.  It is best to quit smoking for as long as possible before any surgery or procedure.    Quitting Smoking; Recognizing Dangerous Situations:  1-Alcohol use during the first month after quitting, 2-Being around smoke or someone who smokes 3-Times situation routinely smoked  4-Triggers-car, breaks, coffee, when awakening, social events  Coping Skills: 1-Learning new ways to manage stress 2-Exercising 3-Relaxation breathing   4-Change routines 5-Distraction techniques    Websites:  Smoke-Free - offers free text messages and an ashtyn to help you quit. Info includes eating and mood issues that may come with quitting. There is a Live Helpline to talk to an expert. Go to smokefree.gov; Become an Ex-Smoker - the free EX Plan is based on scientific research and useful advice from ex-smokers. It isn't just about quitting smoking.  It's about re-learning life without cigarettes using a 3-step program.  Go to becomeanex.org   Centers for Disease Control offer many suggestions for helping you quit. Includes a Quit Guide and real-life stories. There are sections for specific groups such as LGBT, , different ethnic groups, and pregnant women.  Go to cdc.gov/tobacco/campaign/tips    Other Resources:  [place] Tobacco Quit Line - call 1-800-QUIT-NOW or 0-796-294-4449.  [place]-DV 2-1-1 - to find local programs and resources. Call 211 or go to 211.org.  [facility]  "Tobacco Cessation Program - call 191-277-2172.  American Lung Association offers classes for quitting smoking. Some places may charge a fee. For a list of classes, go to lung.org or call 9-255-LUNGBragBet.     Some things to think about:; The health benefits of quitting smoking can help most of the major parts of your body. There is no safe amount of cigarette smoke. Quitting smoking can add years to your life. When you quit, you'll also protect your loved ones from dangerous secondhand smoke. Make a plan, join a support group, and talk to your physician to assist in quitting smoking.                                                                                                                  Other Instructions  Why did I have my nose, under my arms, and groin swabbed? The purpose of the swab is to identify Staphylococcus aureus inside your nose or on your skin.  The swab was sent to the laboratory for culture.  A positive swab/culture for Staphylococcus aureus is called colonization or carriage.     What is Staphylococcus aureus? Staphylococcus aureus, also known as \"staph\", is a germ found on the skin or in the nose of healthy people.  Sometimes Staphylococcus aureus can get into the body and cause an infection.  This can be minor (such as pimples, boils, or other skin problems).  It might also be serious (such as a blood infection, pneumonia, or a surgical site infection).     What is Staphylococcus aureus colonization or carriage? Colonization or carriage means that a person has the germ but is not sick from it.  These bacteria can be spread on the hands or when breathing or sneezing.   How is Staphylococcus aureus spread? It is most often spread by close contact with a person or item that carries it.   What happens if my culture is positive for Staphylococcus aureus? Your doctor/medical team will use this information to guide any antibiotic treatment which may be necessary.  Regardless of the culture results, " we will clean the inside of your nose with a betadine swab just before you have your surgery.   Will I get an infection if I have Staphylococcus aureus in my nose or on my skin? Anyone can get an infection with Staphylococcus aureus.  However, the best way to reduce your risk of infection is to follow the instructions provided to you for the use of your CHG soap and dental rinse.      Body Wash:     What is a home preoperative antibacterial shower? This shower is a way of cleaning the skin with a germ-killing solution before surgery.  The solution contains chlorhexidine, commonly known as CHG.  CHG is a skin cleanser with germ-killing ability.  Let your doctor know if you are allergic to chlorhexidine.   Why do I need to take a preoperative antibacterial shower? Skin is not sterile.  It is best to try to make your skin as free of germs as possible before surgery.  Proper cleansing with a germ-killing soap before surgery can lower the number of germs on your skin.  This helps to reduce the risk of infection at the surgical site.    Following the instructions listed below will help you prepare your skin for surgery.   How do I use the solution? Steps:  Begin using your CHG soap 5 days before your scheduled surgery.      Oral/Dental Rinse:     What is oral/dental rinse?  It is a mouthwash. It is a way of cleaning the mouth with a germ-killing solution before your surgery.  The solution contains chlorhexidine, commonly known as CHG. It is used inside the mouth to kill a bacteria known as Staphylococcus aureus.  Let your doctor know if you are allergic to Chlorhexidine.   Why do I need to use CHG oral/dental rinse? The CHG oral/dental rinse helps to kill a bacteria in your mouth known as Staphylococcus aureus.  This reduces the risk of infection at the surgical site.    Using your CHG oral/dental rinse STEPS: Use your CHG oral/dental rinse after you brush your teeth the night before (at bedtime) and the morning of your  surgery.  Follow all directions on your prescription label.    Use the cap on the container to measure 15 ml.  Swish (gargle if you can) the mouthwash in your mouth for at least 30 seconds, (do not swallow) and spit out.  After you use your CHG rinse, do not rinse your mouth with water, drink or eat.    Please refer to the prescription label for the appropriate time to resume oral intake   What side effects might I have using the CHG oral/dental rinse? CHG rinse will stick to plaque on the teeth.  Brush and floss just before use.  Teeth brushing will help avoid staining of plaque during use.     The Week before Surgery        Seven days before Surgery  Check your PAT medication instructions  Do the exercises provided to you by PAT  Arrange for a responsible, adult licensed  to take you home after surgery and stay with you for 24 hours.  You will not be permitted to drive yourself home if you have received any anesthetic/sedation  Six days before surgery  Check your PAT medication instructions  Do the exercises provided to you by PAT  Start using Chlorhexidene (CHG) body wash if prescribed  Five days before surgery  Check your PAT medication instructions  Do the exercises provided to you by PAT  Continue to use CHG body wash if prescribed  Three days before surgery  Check your PAT medication instructions  Do the exercises provided to you by PAT  Continue to use CHG body wash if prescribed  Two days before surgery  Check your PAT medication instructions  Do the exercises provided to you by PAT  Continue to use CHG body wash if prescribed    The Day before Surgery       Check your PAT medication and all other PAT instructions including when to stop eating and drinking  You will be called with your arrival time for surgery in the late afternoon.  If you do not receive a call please reach out to [facility]. [phone]  Do not smoke or drink 24 hours before surgery  Prepare items to bring with you to the  hospital  Shower with your chlorhexidine wash if prescribed  Brush your teeth and use your chlorhexidine dental rinse if prescribed    The Day of Surgery       Check your PAT medication instructions  Ensure you follow the instructions for when to stop eating and drinking  Shower, if prescribed use CHG.  Do not apply any lotions, creams, moisturizers, perfume or deodorant  Brush your teeth and use your CHG dental rinse if prescribed  Wear loose comfortable clothing  Avoid make-up  Remove  jewelry and piercings, consider professional piercing removal with a plastic spacer if needed  Bring photo ID and Insurance card  Bring an accurate medication list that includes medication dose, frequency and allergies  Bring a copy of your advanced directives (will, health care power of )  Bring any devices and controllers as well as medical devices you have been provided with for surgery (CPAP, slings, braces, etc.)  Dentures, eyeglasses, and contacts will be removed before surgery, please bring cases for contacts or glasses

## 2025-05-05 NOTE — H&P (VIEW-ONLY)
CPM/PAT Evaluation       Name: Nataliia Pandya (Nataliia Pandya)  /Age: 1965/59 y.o.     In-Person       Chief Complaint: Evaluation prior to surgery    HPI    Medical History[1]    Surgical History[2]    Patient  has no history on file for sexual activity.    Family History[3]    Allergies[4]    Prior to Admission medications    Medication Sig Start Date End Date Taking? Authorizing Provider   acetaminophen (Tylenol) 500 mg tablet Take 2 tablets (1,000 mg) by mouth every 6 hours if needed for mild pain (1 - 3) or fever (temp greater than 38.0 C).   Yes Historical Provider, MD   albuterol 90 mcg/actuation aerosol powdr breath activated inhaler Inhale 2 puffs every 6 hours if needed for wheezing.   Yes Historical Provider, MD   alendronate (Fosamax) 70 mg tablet Take 1 tablet (70 mg) by mouth every 7 days. Take in the morning with a full glass of water, on an empty stomach, and do not take anything else by mouth or lie down for the next 30 min.   Yes Historical Provider, MD   armodafinil (Nuvigil) 150 mg tablet Take 1 tablet (150 mg) by mouth once daily.   Yes Historical Provider, MD   atenolol (Tenormin) 25 mg tablet Take 1 tablet (25 mg) by mouth once daily.   Yes Historical Provider, MD   atorvastatin (Lipitor) 40 mg tablet Take 0.5 tablets (20 mg) by mouth once daily.   Yes Historical Provider, MD   calcium carbonate-vitamin D3 (Oyster Shell Calcium-Vit D3) 500 mg-5 mcg (200 unit) tablet Take 1 tablet by mouth once daily.   Yes Historical Provider, MD   dicyclomine (Bentyl) 20 mg tablet Take 1 tablet (20 mg) by mouth 3 times daily (morning, midday, late afternoon).   Yes Historical Provider, MD   docusate sodium (Colace) 100 mg capsule Take 1 capsule (100 mg) by mouth 2 times a day.   Yes Historical Provider, MD   doxycycline (Monodox) 100 mg capsule Take 1 capsule (100 mg) by mouth once daily. Take with at least 8 ounces (large glass) of water, do not lie down for 30 minutes after   Yes Historical  Provider, MD   ergocalciferol (Vitamin D-2) 1.25 MG (11729 UT) capsule Take 1 capsule (1.25 mg) by mouth 1 (one) time per week.   Yes Historical Provider, MD   estradiol (Estrace) 0.01 % (0.1 mg/gram) vaginal cream Insert pea size amount into vagina every night for 2 weeks, then 2x/week after 4/28/25  Yes Chuy Reeves MD   famotidine (Pepcid) 20 mg tablet Take 1 tablet (20 mg) by mouth once daily.   Yes Historical Provider, MD   fluticasone (Flonase) 50 mcg/actuation nasal spray Administer 2 sprays into each nostril once daily at bedtime. Shake gently. Before first use, prime pump. After use, clean tip and replace cap.   Yes Historical Provider, MD   gabapentin (Neurontin) 300 mg capsule Take 1 capsule (300 mg) by mouth 3 times a day.   Yes Historical Provider, MD   hydroCHLOROthiazide (HYDRODiuril) 25 mg tablet Take 1 tablet (25 mg) by mouth once daily.   Yes Historical Provider, MD   ibuprofen 400 mg tablet Take 1 tablet (400 mg) by mouth every 6 hours if needed. 9/11/24  Yes Historical Provider, MD   lisinopril 10 mg tablet Take 1 tablet (10 mg) by mouth once daily.   Yes Historical Provider, MD   methenamine hippurate (Hiprex) 1 gram tablet Take 1 tablet (1 g) by mouth 2 times a day. 4/28/25 4/28/26 Yes Chuy Reeves MD   mometasone-formoterol (Dulera) 50-5 mcg/actuation HFA aerosol inhaler inhaler Inhale 2 puffs 2 times a day.   Yes Historical Provider, MD   omeprazole (PriLOSEC) 20 mg DR capsule Take 1 capsule (20 mg) by mouth once daily. Do not crush or chew.   Yes Historical Provider, MD   ondansetron ODT (Zofran-ODT) 4 mg disintegrating tablet Take 1 tablet (4 mg) by mouth every 8 hours if needed for nausea or vomiting. 3/6/24  Yes Dung Carter MD   [id number] sul-NaCl-KCl-asb-C (Plenvu) 140-9-5.2 gram powder in packet, sequential Take 1 packet by mouth once daily as needed.   Yes Historical Provider, MD   phenazopyridine (Pyridium) 200 mg tablet Take 1 tablet (200 mg) by mouth 2 times a day.   Yes  Historical Provider, MD   tamsulosin (Flomax) 0.4 mg 24 hr capsule Take 1 capsule (0.4 mg) by mouth once daily.   Yes Historical Provider, MD   traMADol (Ultram) 50 mg tablet Take 1 tablet (50 mg) by mouth every 8 hours if needed for severe pain (7 - 10).   Yes Historical Provider, MD   triamcinolone (Kenalog) 0.1 % cream Apply 1 Application topically 2 times a day.   Yes Historical Provider, MD   chlorhexidine (Peridex) 0.12 % solution Use 15 mL in the mouth or throat once daily for 2 days. Use 15ml once the night before surgery and 15ml once the morning of surgery 5/5/25 5/7/25  Edna Ugalde, APRN-CNP        PAT ROS:   Constitutional:   neg    Neuro/Psych:   neg    Eyes:    use of corrective lenses  Ears:   Nose:   Mouth:    dental issues  Throat:   neg    Neck:   neg    Cardio:    FRIEDMAN  Respiratory:   neg    Endocrine:   neg    GI:   neg    :    vaginal issues  Musculoskeletal:    Left knee pain 5/10   arthralgias   decreased ROM  Hematologic:   neg    Skin:  neg        Physical Exam  Vitals reviewed.   Constitutional:       Appearance: She is obese.   HENT:      Head: Normocephalic and atraumatic.      Mouth/Throat:      Mouth: Mucous membranes are moist.      Pharynx: Oropharynx is clear.   Neck:      Vascular: No carotid bruit.   Cardiovascular:      Rate and Rhythm: Normal rate and regular rhythm.      Pulses: Normal pulses.      Heart sounds: Normal heart sounds.   Pulmonary:      Effort: Pulmonary effort is normal.      Breath sounds: Normal breath sounds.   Abdominal:      Palpations: Abdomen is soft.   Musculoskeletal:         General: Tenderness present.      Cervical back: Normal range of motion and neck supple.      Comments: Left knee   Skin:     General: Skin is warm and dry.      Capillary Refill: Capillary refill takes less than 2 seconds.   Neurological:      General: No focal deficit present.      Mental Status: She is alert and oriented to person, place, and time.   Psychiatric:          Mood and Affect: Mood normal.         Behavior: Behavior normal.         Thought Content: Thought content normal.         Judgment: Judgment normal.          PAT AIRWAY:   Airway:     Neck ROM::  Full   Poor dentition        Visit Vitals  /73   Pulse 80   Temp 36.4 °C (97.5 °F)   Resp 18   Ht 1.524 m (5')   Wt 93.6 kg (206 lb 5.6 oz)   SpO2 95%   BMI 40.30 kg/m²   OB Status Postmenopausal   Smoking Status Every Day   BSA 1.99 m²       DASI Risk Score      Flowsheet Row Pre-Admission Testing from 5/5/2025 in [facility]   Can you take care of yourself (eat, dress, bathe, or use toilet)?  2.75 filed at 05/05/2025 1307   Can you walk indoors, such as around your house? 1.75 filed at 05/05/2025 1307   Can you walk a block or two on level ground?  2.75 filed at 05/05/2025 1307   Can you climb a flight of stairs or walk up a hill? 0 filed at 05/05/2025 1307   Can you run a short distance? 0 filed at 05/05/2025 1307   Can you do light work around the house like dusting or washing dishes? 2.7 filed at 05/05/2025 1307   Can you do moderate work around the house like vacuuming, sweeping floors or carrying groceries? 3.5 filed at 05/05/2025 1307   Can you do heavy work around the house like scrubbing floors or lifting and moving heavy furniture?  0 filed at 05/05/2025 1307   Can you do yard work like raking leaves, weeding or pushing a mower? 0 filed at 05/05/2025 1307   Can you have sexual relations? 5.25 filed at 05/05/2025 1307   Can you participate in moderate recreational activities like golf, bowling, dancing, doubles tennis or throwing a baseball or football? 0 filed at 05/05/2025 1307   Can you participate in strenous sports like swimming, singles tennis, football, basketball, or skiing? 0 filed at 05/05/2025 1307   DASI SCORE 18.7 filed at 05/05/2025 1307   METS Score (Will be calculated only when all the questions are answered) 5 filed at 05/05/2025 1307          Capsj DVT Assessment       Flowsheet Row Pre-Admission Testing from 5/5/2025 in [facility] ED to Hosp-Admission (Discharged) from 9/13/2024 in [facility] 3 with Franco Keita DO and Chavo Trinidad MD   DVT Score (IF A SCORE IS NOT CALCULATING, MUST SELECT A BMI TO COMPLETE) 17 filed at 05/05/2025 1422 5 filed at 09/13/2024 2138   Medical Factors COPD, Inflammatory bowel disease filed at 05/05/2025 1422 --   Surgical Factors Major surgery planned, including arthroscopic and laproscopic (1-2 hours), Elective major lower extremity arthroplasty filed at 05/05/2025 1422 --   Labs/Test Results Positive Lupus Anticoagulant filed at 05/05/2025 1422 --   BMI (BMI MUST BE CHOSEN) 31-40 (Obesity) filed at 05/05/2025 1422 31-40 (Obesity) filed at 09/13/2024 2138   RETIRED: Age -- 60-75 years filed at 09/13/2024 2138          Modified Frailty Index    No data to display       [id number] Stroke Risk Points  Current as of 22 minutes ago        N/A 0 to 9 Points:      Last Change: N/A          The [id number] risk score (Lip GH, et al. 2009. © 2010 [employer]) quantifies the risk of stroke for a patient with atrial fibrillation. For patients without atrial fibrillation or under the age of 18 this score appears as N/A. Higher score values generally indicate higher risk of stroke.        This score is not applicable to this patient. Components are not calculated.          Revised Cardiac Risk Index      Flowsheet Row Pre-Admission Testing from 5/5/2025 in [facility]   High-Risk Surgery (Intraperitoneal, Intrathoracic,Suprainguinal vascular) 0 filed at 05/05/2025 1422   History of ischemic heart disease (History of MI, History of positive exercuse test, Current chest paint considered due to myocardial ischemia, Use of nitrate therapy, ECG with pathological Q Waves) 0 filed at 05/05/2025 1422   History of congestive heart failure (pulmonary edemia, bilateral rales or S3 gallop,  Paroxysmal nocturnal dyspnea, CXR showing pulmonary vascular redistribution) 0 filed at 05/05/2025 1422   History of cerebrovascular disease (Prior TIA or stroke) 0 filed at 05/05/2025 1422   Pre-operative insulin treatment 0 filed at 05/05/2025 1422   Pre-operative creatinine>2 mg/dl 0 filed at 05/05/2025 1422   Revised Cardiac Risk Calculator 0 filed at 05/05/2025 1422          Apfel Simplified Score      Flowsheet Row Pre-Admission Testing from 5/5/2025 in [facility]   Smoking status 0 filed at 05/05/2025 1423   History of motion sickness or PONV  1 filed at 05/05/2025 1423   Use of postoperative opioids 1 filed at 05/05/2025 1423   Gender - Female 1=Yes filed at 05/05/2025 1423   Apfel Simplified Score Calculator 3 filed at 05/05/2025 1423          Risk Analysis Index Results This Encounter    No data found in the last 10 encounters.       Stop Bang Score      Flowsheet Row Pre-Admission Testing from 5/5/2025 in [facility]   Do you snore loudly? 0 filed at 05/05/2025 1307   Do you often feel tired or fatigued after your sleep? 1 filed at 05/05/2025 1307   Has anyone ever observed you stop breathing in your sleep? 1 filed at 05/05/2025 1307   Do you have or are you being treated for high blood pressure? 1 filed at 05/05/2025 1307   Recent BMI (Calculated) 39.7 filed at 05/05/2025 1307   Is BMI greater than 35 kg/m2? 1=Yes filed at 05/05/2025 1307   Age older than 50 years old? 1=Yes filed at 05/05/2025 1307   Is your neck circumference greater than 17 inches (Male) or 16 inches (Female)? 1 filed at 05/05/2025 1307   Gender - Male 0=No filed at 05/05/2025 1307   STOP-BANG Total Score 6 filed at 05/05/2025 1307          Prodigy: High Risk  Total Score: 11              Prodigy Age Score      Prodigy Previous Opioid Use Score           ARISCAT Score for Postoperative Pulmonary Complications    No data to display       Partida Perioperative Risk for Myocardial Infarction or Cardiac Arrest  (JESSICA)    No data to display         Results for orders placed or performed in visit on 05/05/25 (from the past 24 hours)   CBC   Result Value Ref Range    WBC 7.8 4.4 - 11.3 x10*3/uL    nRBC 0.0 0.0 - 0.0 /100 WBCs    RBC 4.46 4.00 - 5.20 x10*6/uL    Hemoglobin 14.8 12.0 - 16.0 g/dL    Hematocrit 43.2 36.0 - 46.0 %    MCV 97 80 - 100 fL    MCH 33.2 26.0 - 34.0 pg    MCHC 34.3 32.0 - 36.0 g/dL    RDW 13.9 11.5 - 14.5 %    Platelets 174 150 - 450 x10*3/uL   Comprehensive metabolic panel   Result Value Ref Range    Glucose 88 74 - 99 mg/dL    Sodium 135 (L) 136 - 145 mmol/L    Potassium 4.1 3.5 - 5.3 mmol/L    Chloride 102 98 - 107 mmol/L    Bicarbonate 25 21 - 32 mmol/L    Anion Gap 12 10 - 20 mmol/L    Urea Nitrogen 19 6 - 23 mg/dL    Creatinine 0.72 0.50 - 1.05 mg/dL    eGFR >90 >60 mL/min/1.73m*2    Calcium 9.2 8.6 - 10.3 mg/dL    Albumin 4.3 3.4 - 5.0 g/dL    Alkaline Phosphatase 52 33 - 136 U/L    Total Protein 6.8 6.4 - 8.2 g/dL    AST 16 9 - 39 U/L    Bilirubin, Total 0.5 0.0 - 1.2 mg/dL    ALT 20 7 - 45 U/L        Assessment & Plan:    59 y.o.  female  scheduled for  KNEE REPLACEMENT TOTAL HYBRID UNILAT - Left  on 5/19/25 with Dr. Miramontes for  Left knee pain.  PMHX includes HTN, HLD, Irregular heart beat, Varicose Veins, Diverticulitis, Lupus, Sleep Apnea with CPAP,  COPD/chronic bronchitis and lung nodules, prolapse, mixed urinary incontinence, scoliosis.  PAT consulted for perioperative risk stratification and optimization    Neuro:  No neurologic diagnosis, however, the patient is at increased risk for perioperative delirium secondary to  age, Patient instructed on and provided cognitive exercises  Patient is at increased risk for perioperative CVA secondary to  HTN, increased age    HEENT:  No HEENT diagnosis or significant findings on chart review or clinical presentation and evaluation. No further preoperative testing/intervention indicated at this time.    Cardiovascular:  HTN, HLD, Irregular heart  beat, Varicose Veins    Cardiac Clearance 3/20/25  [facility]  Patient can hold. No further cardiac workup is needed prior to the procedure.  Last visit 1/16/25    METS: 5  RCRI: 0 points, 3.9%  risk for postoperative MACE   JESSICA: 0.6% risk for 30 day postoperative MACE  EKG - 1/16/25  Normal Sinus Rhythm  Nonspecific T wave abnormality  Rate 68    Pulmonary:  Hx sleep apnea with Cpap  COPD/chronic bronchitis and lung nodules, active smoker, exertional SOB, hypersomnia  age > 60, COPD, Tobacco abuse, obesity    CHRISTIAN controlled, compliant, Cpap usage atleast 6 hours per night  COPD with chronic bronchitis controlled on albuterol, dulera    Follows with Pul[facility], Clearance Requested    Stop Bang score is 6 placing patient at high risk for CHRISTIAN  ARISCAT: <26 points, 1.6% risk of in-hospital postoperative pulmonary complication  PRODIGY: High risk for opioid induced respiratory depression  Smoking cessation discussed with patient. Patient also provided cessation education/hotline handout.   Pulmonary education discussed. Patient provided deep breathing exercises and educational handout.      Urological/GYN/Renal  prolapse, mixed urinary incontinence    Endocrine:  No endocrine diagnosis or significant findings on chart review or clinical presentation and evaluation. No further testing or intervention is indicated at this time.    Hematologic:  Antiplatelet management   The patient is not currently receiving antiplatelet therapy.  Anticoagulation management  The patient is not currently receiving anticoagulation therapy. Patient provided with DVT educational handout.    Caprini Score 17, patient at high risk for perioperative DVT.  Patient provided with VTE education/handout.    Gastrointestinal:   Hx prior hernia surgeries with mesh, diverticulitis  Eat-10 score 0      Infectious disease:   No infectious diagnosis or significant findings on chart review or clinical  presentation and evaluation.   Prescription provided for CHG body wash and dental rinse. CHG use instructions reviewed and provided to patient.  Staph screen collected    Musculoskeletal:   Left knee pain, scoliosis    Anesthesia/Airway:  PONV  Prior knee surgery failed spinal attempt, converted to general     Medication instructions and NPO guidelines reviewed with the patient.  All questions or concerns discussed and addressed.      Labs ordered   CBC  CMP  A1C  MRSA  Peridex         [1]   Past Medical History:  Diagnosis Date    Adverse effect of anesthesia     pt states she cannot have spinal due to scoliosis    Arthritis     Carotid artery stenosis     right    Chronic pain     COPD (chronic obstructive pulmonary disease) (Multi)     Diverticulitis     Hyperlipidemia     Hypertension     Lung nodules     Lupus (systemic lupus erythematosus) (Multi)     Morbid obesity (Multi)     PONV (postoperative nausea and vomiting)     Scoliosis     Sleep apnea     with CPAP    Uses roller walker     Wears glasses    [2]   Past Surgical History:  Procedure Laterality Date    CARPAL TUNNEL RELEASE Right     CATARACT EXTRACTION       SECTION, LOW TRANSVERSE      CHOLECYSTECTOMY      COLONOSCOPY      CYSTOSCOPY      DILATION AND CURETTAGE OF UTERUS      FOOT Right     HYSTERECTOMY      with right oophorectomy and bilateral salpingectomy    UPPER GASTROINTESTINAL ENDOSCOPY     [3] No family history on file.  [4] No Known Allergies

## 2025-05-05 NOTE — CPM/PAT H&P
CPM/PAT Evaluation       Name: Nataliia Pandya (Nataliia Pandya)  /Age: 1965/59 y.o.     In-Person       Chief Complaint: Evaluation prior to surgery    HPI    Medical History[1]    Surgical History[2]    Patient  has no history on file for sexual activity.    Family History[3]    Allergies[4]    Prior to Admission medications    Medication Sig Start Date End Date Taking? Authorizing Provider   acetaminophen (Tylenol) 500 mg tablet Take 2 tablets (1,000 mg) by mouth every 6 hours if needed for mild pain (1 - 3) or fever (temp greater than 38.0 C).   Yes Historical Provider, MD   albuterol 90 mcg/actuation aerosol powdr breath activated inhaler Inhale 2 puffs every 6 hours if needed for wheezing.   Yes Historical Provider, MD   alendronate (Fosamax) 70 mg tablet Take 1 tablet (70 mg) by mouth every 7 days. Take in the morning with a full glass of water, on an empty stomach, and do not take anything else by mouth or lie down for the next 30 min.   Yes Historical Provider, MD   armodafinil (Nuvigil) 150 mg tablet Take 1 tablet (150 mg) by mouth once daily.   Yes Historical Provider, MD   atenolol (Tenormin) 25 mg tablet Take 1 tablet (25 mg) by mouth once daily.   Yes Historical Provider, MD   atorvastatin (Lipitor) 40 mg tablet Take 0.5 tablets (20 mg) by mouth once daily.   Yes Historical Provider, MD   calcium carbonate-vitamin D3 (Oyster Shell Calcium-Vit D3) 500 mg-5 mcg (200 unit) tablet Take 1 tablet by mouth once daily.   Yes Historical Provider, MD   dicyclomine (Bentyl) 20 mg tablet Take 1 tablet (20 mg) by mouth 3 times daily (morning, midday, late afternoon).   Yes Historical Provider, MD   docusate sodium (Colace) 100 mg capsule Take 1 capsule (100 mg) by mouth 2 times a day.   Yes Historical Provider, MD   doxycycline (Monodox) 100 mg capsule Take 1 capsule (100 mg) by mouth once daily. Take with at least 8 ounces (large glass) of water, do not lie down for 30 minutes after   Yes Historical  Provider, MD   ergocalciferol (Vitamin D-2) 1.25 MG (95917 UT) capsule Take 1 capsule (1.25 mg) by mouth 1 (one) time per week.   Yes Historical Provider, MD   estradiol (Estrace) 0.01 % (0.1 mg/gram) vaginal cream Insert pea size amount into vagina every night for 2 weeks, then 2x/week after 4/28/25  Yes Chuy Reeves MD   famotidine (Pepcid) 20 mg tablet Take 1 tablet (20 mg) by mouth once daily.   Yes Historical Provider, MD   fluticasone (Flonase) 50 mcg/actuation nasal spray Administer 2 sprays into each nostril once daily at bedtime. Shake gently. Before first use, prime pump. After use, clean tip and replace cap.   Yes Historical Provider, MD   gabapentin (Neurontin) 300 mg capsule Take 1 capsule (300 mg) by mouth 3 times a day.   Yes Historical Provider, MD   hydroCHLOROthiazide (HYDRODiuril) 25 mg tablet Take 1 tablet (25 mg) by mouth once daily.   Yes Historical Provider, MD   ibuprofen 400 mg tablet Take 1 tablet (400 mg) by mouth every 6 hours if needed. 9/11/24  Yes Historical Provider, MD   lisinopril 10 mg tablet Take 1 tablet (10 mg) by mouth once daily.   Yes Historical Provider, MD   methenamine hippurate (Hiprex) 1 gram tablet Take 1 tablet (1 g) by mouth 2 times a day. 4/28/25 4/28/26 Yes Chuy Reeves MD   mometasone-formoterol (Dulera) 50-5 mcg/actuation HFA aerosol inhaler inhaler Inhale 2 puffs 2 times a day.   Yes Historical Provider, MD   omeprazole (PriLOSEC) 20 mg DR capsule Take 1 capsule (20 mg) by mouth once daily. Do not crush or chew.   Yes Historical Provider, MD   ondansetron ODT (Zofran-ODT) 4 mg disintegrating tablet Take 1 tablet (4 mg) by mouth every 8 hours if needed for nausea or vomiting. 3/6/24  Yes Dung Carter MD   [id number] sul-NaCl-KCl-asb-C (Plenvu) 140-9-5.2 gram powder in packet, sequential Take 1 packet by mouth once daily as needed.   Yes Historical Provider, MD   phenazopyridine (Pyridium) 200 mg tablet Take 1 tablet (200 mg) by mouth 2 times a day.   Yes  Historical Provider, MD   tamsulosin (Flomax) 0.4 mg 24 hr capsule Take 1 capsule (0.4 mg) by mouth once daily.   Yes Historical Provider, MD   traMADol (Ultram) 50 mg tablet Take 1 tablet (50 mg) by mouth every 8 hours if needed for severe pain (7 - 10).   Yes Historical Provider, MD   triamcinolone (Kenalog) 0.1 % cream Apply 1 Application topically 2 times a day.   Yes Historical Provider, MD   chlorhexidine (Peridex) 0.12 % solution Use 15 mL in the mouth or throat once daily for 2 days. Use 15ml once the night before surgery and 15ml once the morning of surgery 5/5/25 5/7/25  Edna Ugalde, APRN-CNP        PAT ROS:   Constitutional:   neg    Neuro/Psych:   neg    Eyes:    use of corrective lenses  Ears:   Nose:   Mouth:    dental issues  Throat:   neg    Neck:   neg    Cardio:    FRIEDMAN  Respiratory:   neg    Endocrine:   neg    GI:   neg    :    vaginal issues  Musculoskeletal:    Left knee pain 5/10   arthralgias   decreased ROM  Hematologic:   neg    Skin:  neg        Physical Exam  Vitals reviewed.   Constitutional:       Appearance: She is obese.   HENT:      Head: Normocephalic and atraumatic.      Mouth/Throat:      Mouth: Mucous membranes are moist.      Pharynx: Oropharynx is clear.   Neck:      Vascular: No carotid bruit.   Cardiovascular:      Rate and Rhythm: Normal rate and regular rhythm.      Pulses: Normal pulses.      Heart sounds: Normal heart sounds.   Pulmonary:      Effort: Pulmonary effort is normal.      Breath sounds: Normal breath sounds.   Abdominal:      Palpations: Abdomen is soft.   Musculoskeletal:         General: Tenderness present.      Cervical back: Normal range of motion and neck supple.      Comments: Left knee   Skin:     General: Skin is warm and dry.      Capillary Refill: Capillary refill takes less than 2 seconds.   Neurological:      General: No focal deficit present.      Mental Status: She is alert and oriented to person, place, and time.   Psychiatric:          Mood and Affect: Mood normal.         Behavior: Behavior normal.         Thought Content: Thought content normal.         Judgment: Judgment normal.          PAT AIRWAY:   Airway:     Neck ROM::  Full   Poor dentition        Visit Vitals  /73   Pulse 80   Temp 36.4 °C (97.5 °F)   Resp 18   Ht 1.524 m (5')   Wt 93.6 kg (206 lb 5.6 oz)   SpO2 95%   BMI 40.30 kg/m²   OB Status Postmenopausal   Smoking Status Every Day   BSA 1.99 m²       DASI Risk Score      Flowsheet Row Pre-Admission Testing from 5/5/2025 in [facility]   Can you take care of yourself (eat, dress, bathe, or use toilet)?  2.75 filed at 05/05/2025 1307   Can you walk indoors, such as around your house? 1.75 filed at 05/05/2025 1307   Can you walk a block or two on level ground?  2.75 filed at 05/05/2025 1307   Can you climb a flight of stairs or walk up a hill? 0 filed at 05/05/2025 1307   Can you run a short distance? 0 filed at 05/05/2025 1307   Can you do light work around the house like dusting or washing dishes? 2.7 filed at 05/05/2025 1307   Can you do moderate work around the house like vacuuming, sweeping floors or carrying groceries? 3.5 filed at 05/05/2025 1307   Can you do heavy work around the house like scrubbing floors or lifting and moving heavy furniture?  0 filed at 05/05/2025 1307   Can you do yard work like raking leaves, weeding or pushing a mower? 0 filed at 05/05/2025 1307   Can you have sexual relations? 5.25 filed at 05/05/2025 1307   Can you participate in moderate recreational activities like golf, bowling, dancing, doubles tennis or throwing a baseball or football? 0 filed at 05/05/2025 1307   Can you participate in strenous sports like swimming, singles tennis, football, basketball, or skiing? 0 filed at 05/05/2025 1307   DASI SCORE 18.7 filed at 05/05/2025 1307   METS Score (Will be calculated only when all the questions are answered) 5 filed at 05/05/2025 1307          Capsj DVT Assessment       Flowsheet Row Pre-Admission Testing from 5/5/2025 in [facility] ED to Hosp-Admission (Discharged) from 9/13/2024 in [facility] 3 with Franco Keita DO and Chavo Trinidad MD   DVT Score (IF A SCORE IS NOT CALCULATING, MUST SELECT A BMI TO COMPLETE) 17 filed at 05/05/2025 1422 5 filed at 09/13/2024 2138   Medical Factors COPD, Inflammatory bowel disease filed at 05/05/2025 1422 --   Surgical Factors Major surgery planned, including arthroscopic and laproscopic (1-2 hours), Elective major lower extremity arthroplasty filed at 05/05/2025 1422 --   Labs/Test Results Positive Lupus Anticoagulant filed at 05/05/2025 1422 --   BMI (BMI MUST BE CHOSEN) 31-40 (Obesity) filed at 05/05/2025 1422 31-40 (Obesity) filed at 09/13/2024 2138   RETIRED: Age -- 60-75 years filed at 09/13/2024 2138          Modified Frailty Index    No data to display       [id number] Stroke Risk Points  Current as of 22 minutes ago        N/A 0 to 9 Points:      Last Change: N/A          The [id number] risk score (Lip GH, et al. 2009. © 2010 [employer]) quantifies the risk of stroke for a patient with atrial fibrillation. For patients without atrial fibrillation or under the age of 18 this score appears as N/A. Higher score values generally indicate higher risk of stroke.        This score is not applicable to this patient. Components are not calculated.          Revised Cardiac Risk Index      Flowsheet Row Pre-Admission Testing from 5/5/2025 in [facility]   High-Risk Surgery (Intraperitoneal, Intrathoracic,Suprainguinal vascular) 0 filed at 05/05/2025 1422   History of ischemic heart disease (History of MI, History of positive exercuse test, Current chest paint considered due to myocardial ischemia, Use of nitrate therapy, ECG with pathological Q Waves) 0 filed at 05/05/2025 1422   History of congestive heart failure (pulmonary edemia, bilateral rales or S3 gallop,  Paroxysmal nocturnal dyspnea, CXR showing pulmonary vascular redistribution) 0 filed at 05/05/2025 1422   History of cerebrovascular disease (Prior TIA or stroke) 0 filed at 05/05/2025 1422   Pre-operative insulin treatment 0 filed at 05/05/2025 1422   Pre-operative creatinine>2 mg/dl 0 filed at 05/05/2025 1422   Revised Cardiac Risk Calculator 0 filed at 05/05/2025 1422          Apfel Simplified Score      Flowsheet Row Pre-Admission Testing from 5/5/2025 in [facility]   Smoking status 0 filed at 05/05/2025 1423   History of motion sickness or PONV  1 filed at 05/05/2025 1423   Use of postoperative opioids 1 filed at 05/05/2025 1423   Gender - Female 1=Yes filed at 05/05/2025 1423   Apfel Simplified Score Calculator 3 filed at 05/05/2025 1423          Risk Analysis Index Results This Encounter    No data found in the last 10 encounters.       Stop Bang Score      Flowsheet Row Pre-Admission Testing from 5/5/2025 in [facility]   Do you snore loudly? 0 filed at 05/05/2025 1307   Do you often feel tired or fatigued after your sleep? 1 filed at 05/05/2025 1307   Has anyone ever observed you stop breathing in your sleep? 1 filed at 05/05/2025 1307   Do you have or are you being treated for high blood pressure? 1 filed at 05/05/2025 1307   Recent BMI (Calculated) 39.7 filed at 05/05/2025 1307   Is BMI greater than 35 kg/m2? 1=Yes filed at 05/05/2025 1307   Age older than 50 years old? 1=Yes filed at 05/05/2025 1307   Is your neck circumference greater than 17 inches (Male) or 16 inches (Female)? 1 filed at 05/05/2025 1307   Gender - Male 0=No filed at 05/05/2025 1307   STOP-BANG Total Score 6 filed at 05/05/2025 1307          Prodigy: High Risk  Total Score: 11              Prodigy Age Score      Prodigy Previous Opioid Use Score           ARISCAT Score for Postoperative Pulmonary Complications    No data to display       Partida Perioperative Risk for Myocardial Infarction or Cardiac Arrest  (JESSICA)    No data to display         Results for orders placed or performed in visit on 05/05/25 (from the past 24 hours)   CBC   Result Value Ref Range    WBC 7.8 4.4 - 11.3 x10*3/uL    nRBC 0.0 0.0 - 0.0 /100 WBCs    RBC 4.46 4.00 - 5.20 x10*6/uL    Hemoglobin 14.8 12.0 - 16.0 g/dL    Hematocrit 43.2 36.0 - 46.0 %    MCV 97 80 - 100 fL    MCH 33.2 26.0 - 34.0 pg    MCHC 34.3 32.0 - 36.0 g/dL    RDW 13.9 11.5 - 14.5 %    Platelets 174 150 - 450 x10*3/uL   Comprehensive metabolic panel   Result Value Ref Range    Glucose 88 74 - 99 mg/dL    Sodium 135 (L) 136 - 145 mmol/L    Potassium 4.1 3.5 - 5.3 mmol/L    Chloride 102 98 - 107 mmol/L    Bicarbonate 25 21 - 32 mmol/L    Anion Gap 12 10 - 20 mmol/L    Urea Nitrogen 19 6 - 23 mg/dL    Creatinine 0.72 0.50 - 1.05 mg/dL    eGFR >90 >60 mL/min/1.73m*2    Calcium 9.2 8.6 - 10.3 mg/dL    Albumin 4.3 3.4 - 5.0 g/dL    Alkaline Phosphatase 52 33 - 136 U/L    Total Protein 6.8 6.4 - 8.2 g/dL    AST 16 9 - 39 U/L    Bilirubin, Total 0.5 0.0 - 1.2 mg/dL    ALT 20 7 - 45 U/L        Assessment & Plan:    59 y.o.  female  scheduled for  KNEE REPLACEMENT TOTAL HYBRID UNILAT - Left  on 5/19/25 with Dr. Miramontes for  Left knee pain.  PMHX includes HTN, HLD, Irregular heart beat, Varicose Veins, Diverticulitis, Lupus, Sleep Apnea with CPAP,  COPD/chronic bronchitis and lung nodules, prolapse, mixed urinary incontinence, scoliosis.  PAT consulted for perioperative risk stratification and optimization    Neuro:  No neurologic diagnosis, however, the patient is at increased risk for perioperative delirium secondary to  age, Patient instructed on and provided cognitive exercises  Patient is at increased risk for perioperative CVA secondary to  HTN, increased age    HEENT:  No HEENT diagnosis or significant findings on chart review or clinical presentation and evaluation. No further preoperative testing/intervention indicated at this time.    Cardiovascular:  HTN, HLD, Irregular heart  beat, Varicose Veins    Cardiac Clearance 3/20/25  [facility]  Patient can hold. No further cardiac workup is needed prior to the procedure.  Last visit 1/16/25    METS: 5  RCRI: 0 points, 3.9%  risk for postoperative MACE   JESSICA: 0.6% risk for 30 day postoperative MACE  EKG - 1/16/25  Normal Sinus Rhythm  Nonspecific T wave abnormality  Rate 68    Pulmonary:  Hx sleep apnea with Cpap  COPD/chronic bronchitis and lung nodules, active smoker, exertional SOB, hypersomnia  age > 60, COPD, Tobacco abuse, obesity    CHRISTIAN controlled, compliant, Cpap usage atleast 6 hours per night  COPD with chronic bronchitis controlled on albuterol, dulera    Follows with Pulmonology [facility], Clearance Requested    Stop Bang score is 6 placing patient at high risk for CHRISTIAN  ARISCAT: <26 points, 1.6% risk of in-hospital postoperative pulmonary complication  PRODIGY: High risk for opioid induced respiratory depression  Smoking cessation discussed with patient. Patient also provided cessation education/hotline handout.   Pulmonary education discussed. Patient provided deep breathing exercises and educational handout.      Urological/GYN/Renal  prolapse, mixed urinary incontinence    Endocrine:  No endocrine diagnosis or significant findings on chart review or clinical presentation and evaluation. No further testing or intervention is indicated at this time.    Hematologic:  Antiplatelet management   The patient is not currently receiving antiplatelet therapy.  Anticoagulation management  The patient is not currently receiving anticoagulation therapy. Patient provided with DVT educational handout.    Caprini Score 17, patient at high risk for perioperative DVT.  Patient provided with VTE education/handout.    Gastrointestinal:   Hx prior hernia surgeries with mesh, diverticulitis  Eat-10 score 0      Infectious disease:   No infectious diagnosis or significant findings on chart review or clinical  presentation and evaluation.   Prescription provided for CHG body wash and dental rinse. CHG use instructions reviewed and provided to patient.  Staph screen collected    Musculoskeletal:   Left knee pain, scoliosis    Anesthesia/Airway:  PONV  Prior knee surgery failed spinal attempt, converted to general     Medication instructions and NPO guidelines reviewed with the patient.  All questions or concerns discussed and addressed.      Labs ordered   CBC  CMP  A1C  MRSA  Peridex         [1]   Past Medical History:  Diagnosis Date    Adverse effect of anesthesia     pt states she cannot have spinal due to scoliosis    Arthritis     Carotid artery stenosis     right    Chronic pain     COPD (chronic obstructive pulmonary disease) (Multi)     Diverticulitis     Hyperlipidemia     Hypertension     Lung nodules     Lupus (systemic lupus erythematosus) (Multi)     Morbid obesity (Multi)     PONV (postoperative nausea and vomiting)     Scoliosis     Sleep apnea     with CPAP    Uses roller walker     Wears glasses    [2]   Past Surgical History:  Procedure Laterality Date    CARPAL TUNNEL RELEASE Right     CATARACT EXTRACTION       SECTION, LOW TRANSVERSE      CHOLECYSTECTOMY      COLONOSCOPY      CYSTOSCOPY      DILATION AND CURETTAGE OF UTERUS      FOOT Right     HYSTERECTOMY      with right oophorectomy and bilateral salpingectomy    UPPER GASTROINTESTINAL ENDOSCOPY     [3] No family history on file.  [4] No Known Allergies

## 2025-05-06 DIAGNOSIS — N39.3 SUI (STRESS URINARY INCONTINENCE, FEMALE): ICD-10-CM

## 2025-05-06 DIAGNOSIS — N32.81 OAB (OVERACTIVE BLADDER): ICD-10-CM

## 2025-05-06 LAB
EST. AVERAGE GLUCOSE BLD GHB EST-MCNC: 103 MG/DL
HBA1C MFR BLD: 5.2 % (ref ?–5.7)

## 2025-05-07 DIAGNOSIS — N32.81 OAB (OVERACTIVE BLADDER): ICD-10-CM

## 2025-05-07 DIAGNOSIS — N39.3 SUI (STRESS URINARY INCONTINENCE, FEMALE): ICD-10-CM

## 2025-05-08 DIAGNOSIS — N32.81 OAB (OVERACTIVE BLADDER): ICD-10-CM

## 2025-05-08 DIAGNOSIS — N39.3 SUI (STRESS URINARY INCONTINENCE, FEMALE): ICD-10-CM

## 2025-05-08 LAB — STAPHYLOCOCCUS SPEC CULT: NORMAL

## 2025-05-09 DIAGNOSIS — N39.3 SUI (STRESS URINARY INCONTINENCE, FEMALE): ICD-10-CM

## 2025-05-09 DIAGNOSIS — N32.81 OAB (OVERACTIVE BLADDER): ICD-10-CM

## 2025-05-12 DIAGNOSIS — N32.81 OAB (OVERACTIVE BLADDER): ICD-10-CM

## 2025-05-12 DIAGNOSIS — N39.3 SUI (STRESS URINARY INCONTINENCE, FEMALE): ICD-10-CM

## 2025-05-13 DIAGNOSIS — N39.3 SUI (STRESS URINARY INCONTINENCE, FEMALE): ICD-10-CM

## 2025-05-13 DIAGNOSIS — N32.81 OAB (OVERACTIVE BLADDER): ICD-10-CM

## 2025-05-14 ENCOUNTER — ANESTHESIA EVENT (OUTPATIENT)
Dept: OPERATING ROOM | Facility: HOSPITAL | Age: 60
End: 2025-05-14
Payer: COMMERCIAL

## 2025-05-16 PROCEDURE — RXMED WILLOW AMBULATORY MEDICATION CHARGE

## 2025-05-16 RX ORDER — OXYCODONE AND ACETAMINOPHEN 5; 325 MG/1; MG/1
1 TABLET ORAL EVERY 4 HOURS PRN
Qty: 36 TABLET | Refills: 0 | Status: ON HOLD | OUTPATIENT
Start: 2025-05-16 | End: 2025-05-22 | Stop reason: SDUPTHER

## 2025-05-16 RX ORDER — NAPROXEN SODIUM 220 MG/1
81 TABLET, FILM COATED ORAL 2 TIMES DAILY
Qty: 56 TABLET | Refills: 0 | Status: SHIPPED | OUTPATIENT
Start: 2025-05-16 | End: 2025-06-16

## 2025-05-16 RX ORDER — DOCUSATE SODIUM 100 MG/1
100 CAPSULE, LIQUID FILLED ORAL 2 TIMES DAILY
Qty: 20 CAPSULE | Refills: 0 | Status: SHIPPED | OUTPATIENT
Start: 2025-05-16 | End: 2025-05-29

## 2025-05-16 RX ORDER — POLYETHYLENE GLYCOL 3350 17 G/17G
17 POWDER, FOR SOLUTION ORAL DAILY
Qty: 238 G | Refills: 0 | Status: SHIPPED | OUTPATIENT
Start: 2025-05-16

## 2025-05-16 RX ORDER — CEFADROXIL 500 MG/1
500 CAPSULE ORAL 2 TIMES DAILY
Qty: 14 CAPSULE | Refills: 0 | Status: SHIPPED | OUTPATIENT
Start: 2025-05-16 | End: 2025-05-26

## 2025-05-16 RX ORDER — NAPROXEN 500 MG/1
500 TABLET ORAL
Qty: 60 TABLET | Refills: 0 | Status: SHIPPED | OUTPATIENT
Start: 2025-05-16 | End: 2025-06-18

## 2025-05-16 NOTE — DISCHARGE INSTRUCTIONS
TOTAL HIP AND KNEE REPLACEMENT DISCHARGE INSTRUCTIONS  Kevin Miramontes MD      DRIVING & TRAVEL AFTER SURGERY   Patients should anticipate waiting at least 4-6 weeks before traveling long distances after surgery.  You will need to stop to walk around ever 1 hour during your travel to help with blood clot prevention.  Please call the office or your joint nurse to discuss prior to post-surgical travel.  Patients may not drive until cleared by the joint nurse or the office.    DENTAL PROCEDURES & CLEANINGS  You must wait a minimum of 3 months for elective dental appointments, including routine cleanings or dental work including bridges, crowns, extractions, etc..  For any dental visit - cleaning or dental procedures - patients must take an antibiotic 1 hour before the appointment.  Antibiotics are a lifelong need before dental appointments.  The antibiotic prescribed will be based on each patient's allergies.    WOUND CARE  If you experience continued drainage or bleeding, you may cover with abdominal pads (purchase at local drug store).  Knee replacements should wrap with an ace wrap.  Do not remove surgical dressing, it will be removed when you arrive in clinic for follow up visit.   Mesh dressing under the bandage will remain in place until it peels off on its own.  If it is loose, you may gently remove.  Do not remove if pulling causes resistance against the incision.      PAIN, SWELLING, BRUISING & CLICKING  Pain and swelling are a natural part of your recovery which is considered normal fur up to a year after surgery.  Symptoms may be treated with movement, ice, compression stockings, elevating your leg, and by following the pain medication regimen as prescribed.  Bruising is normal for several weeks after surgery and will run down the leg over time.  You may ice areas that are tender to help with discomfort.  You are required to wear the provided compression stockings, every day, for 4 weeks following surgery.   Remove the stockings at night and place them back on in the morning.  Pain and swelling may temporarily increase with an increase in activity or exercise.  Use ice after activity.  Audible clicking with movement or exercises is considered normal following joint replacement.  You may also feel decreased sensation or numbness near the incision site.  These are usually normal and may or may not fade over time.     PERSONAL HYGIENE  You may shower upon discharging from the hospital.  Soap and water is permitted to run over the surgical dressing, steri-strips and incision.  Do not scrub directly over these items.  DO NOT soak your incision in a bath, hot tub, pool or pond/lake for a minimum of 8 weeks following your surgery.  DO NOT use lotions, creams, ointments on your wound for a minimum of 6 weeks following your surgery. At that time you may use vitamin E to assist with softening of your incision.      RESTARTING HOME ROUTINE - DIET & MEDICATIONS  Post-operative constipation can result due to a combination of inactivity, anesthesia and pain medication. To help prevent this, you should increase your water and fiber intake. Physical activity such as walking will also help stimulate the bowels.   You may resume your normal diet when you discharge home.  Choose foods that help promote good bowel habits and prevent constipation, such as foods high in fiber.  You may restart your home medications the following day after your surgery UNLESS you have been given alternate instructions.  Follow the instructions given to you on your hospital discharge instructions for more information regarding your home medications.      IN-HOME PHYSICAL THERAPY & OUTPATIENT PHYSICAL THERAPY  In-home physical therapy will start 1-2 days after you get home from the hospital.    The home care agency will call within the first 24-48 hours to set up their first visit.  Please do not call your care team to inquire during this timeframe.  Continue  the exercises you were given in the hospital until you have been seen by in-home therapy.  Make sure to provide a phone number with the ability for the home care staff to leave a message if you do not answer your phone.    Outpatient physical therapy following knee replacement surgery should begin 2-3 weeks after surgery.  You should call to schedule this appointment ASAP if not already scheduled before surgery.  Waiting until you are ready for outpatient physical therapy will cause a delay in your care.  You may choose any outpatient physical therapy location.  Call the office for an order if needed.    EMERGENCIES - WHEN TO CONTACT THE SURGEON'S OFFICE IMMEDIATELY  Fever >101 with chills that has been present for at least 48 hours.   Excessive bleeding from incision that will not slow down. A small amount of drainage is normal and expected.  Once pressure is applied and the area is covered, do not continue to check the area regularly.  This will remove pressure and bleeding will continue.  Leave in place for 4-6 hours.  Signs of infection of incision-excessive drainage that is soaking through your dressing (especially if it is pus-like), redness that is spreading out from the edges of your incision, or increased warmth around the area.  Excruciating pain for which the pain medication, taken as instructed, is not helping.  Severe calf pain.  Go directly to the emergency room or call 911, if you are experiencing chest pain or difficulty breathing.    ICE & COLD THERAPY INSTRUCTIONS    To assist with pain control and post-op swelling, you should be using ice regularly throughout recovery, especially for the first 6 weeks, regardless of the cold therapy method you use.      Always make sure there is a layer of protection between the cold pad and your skin.    If you are using ICE PACKS or GEL PACKS, you will need to alternate 20 minutes on, 20 minutes off twice per hour.    If you are using an ICE MACHINE, please  follow the provided ice machine instructions.  These devices differ from ice or ice packs whereas the mechanism circulates water through tubing and a pad to provide longer periods of cold therapy to the desired site.  You can use your cold devices around the clock for optimal comfort.  We recommend using cold therapy after working with therapy or completing exercises on your own.  There is no set schedule in which you must follow while using cold therapy.  Below are a few points to remember when using a cold therapy device:    You do not need to need to use the 20 on, 20 off method.  Detach the pad from the cooler and ambulate at least once every hour.  You can check your skin under the pad at this time.  You may wear the cold therapy device during periods of sleep including overnight.  If you wake up during the night, you can check the skin at this time.  You do not need to wake up specifically to perform skin checks.  Empty the cooler and pad when device is not in use.  Follow 's instructions for cleaning your cold therapy device.      DISCHARGE MEDICATIONS    PAIN MEDICATION    __X__ Oxycodone-acetaminophen  Oxycodone-acetaminophen has been prescribed for post-operative pain control.    These medications may only be refilled if neededONCE every 7 days for a period of up to 6 weeks following surgery.  After 6 weeks, you will transition to acetaminophen and over -the- counter anti-inflammatories such as Ibuprofen, Advil or Aleve in conjunction with ICE/COLD THERAPY.   Side effects may be constipation and nausea, vomiting, sleepiness, dizziness, lightheadedness, headache, blurred vision, dry mouth sweating, itching (if you have itching, over-the -counter Benadryl can be used as needed).  You may NOT operate a motor vehicle while taking these medications or have been cleared by your care team.    Please call the office for a refill request.  The office staff and orthopedic nurses cannot refill  medications; messages should be left directly through the office.  Please do not call multiple times or call other members of the care team for medication needs, this will cause the refill to take longer.    Per State and Institutional policy, pain medications can only be refilled every 7 days for up to six weeks following surgery.   .    ___X____ Naproxen has been prescribed as an adjunct anti-inflammatory to assist in pain control.    Take one 500 mg tablet twice a day for 4 weeks.  You will not receive refills on this medication.   Side effects may include nausea.  May not be prescribed if you are on a more potent blood thinner than aspirin or have chronic kidney disease.    BLOOD THINNER    __X__ Blood Thinner   A blood thinner has been prescribed to prevent blood clots in your leg or lungs. Take as prescribed on the bottle for 4 weeks. You will not receive a refill on this medication.      STOOL SOFTENERS    __X__ Colace (Docusate Sodium)   Take this medication to help with constipation while using the Oxycodone for pain control.  You will not receive a refill on this medication.  If you have not had a bowel movement for 2 days after surgery if you feel painful constipation, you may try over-the-counter Miralax or a suppository. If you still are not able to have a bowel movement, call our office or discuss with your PCP. If you have preexisting constipation or other GI issues that may predispose you to constipation, please consult with your GI provider or PCP if you have not had a bowel movement for 2 days after surgery.     Antibiotics    __X__ Cefadroxil or doxycycline  May be prescribed if needed for prophylaxis against infection   Take 7 day course of antibiotics until they are all gone  May not be prescribed if you do not meet criteria for elevated infection risk after surgery          You will not receive refills on the following medications:    Naproxen  Colace  Blood Thinner

## 2025-05-18 ENCOUNTER — HOME HEALTH ADMISSION (OUTPATIENT)
Dept: HOME HEALTH SERVICES | Facility: HOME HEALTH | Age: 60
End: 2025-05-18
Payer: COMMERCIAL

## 2025-05-19 ENCOUNTER — HOSPITAL ENCOUNTER (OUTPATIENT)
Facility: HOSPITAL | Age: 60
Setting detail: OUTPATIENT SURGERY
Discharge: HOME HEALTH | End: 2025-05-19
Attending: ORTHOPAEDIC SURGERY | Admitting: ORTHOPAEDIC SURGERY
Payer: COMMERCIAL

## 2025-05-19 ENCOUNTER — DOCUMENTATION (OUTPATIENT)
Dept: HOME HEALTH SERVICES | Facility: HOME HEALTH | Age: 60
End: 2025-05-19

## 2025-05-19 ENCOUNTER — ANESTHESIA (OUTPATIENT)
Dept: OPERATING ROOM | Facility: HOSPITAL | Age: 60
End: 2025-05-19
Payer: COMMERCIAL

## 2025-05-19 ENCOUNTER — APPOINTMENT (OUTPATIENT)
Dept: RADIOLOGY | Facility: HOSPITAL | Age: 60
End: 2025-05-19
Payer: COMMERCIAL

## 2025-05-19 ENCOUNTER — PHARMACY VISIT (OUTPATIENT)
Dept: PHARMACY | Facility: CLINIC | Age: 60
End: 2025-05-19
Payer: COMMERCIAL

## 2025-05-19 VITALS
WEIGHT: 207.23 LBS | TEMPERATURE: 97 F | DIASTOLIC BLOOD PRESSURE: 74 MMHG | OXYGEN SATURATION: 98 % | SYSTOLIC BLOOD PRESSURE: 115 MMHG | HEIGHT: 60 IN | BODY MASS INDEX: 40.69 KG/M2 | RESPIRATION RATE: 15 BRPM | HEART RATE: 69 BPM

## 2025-05-19 DIAGNOSIS — M25.562 LEFT KNEE PAIN, UNSPECIFIED CHRONICITY: Primary | ICD-10-CM

## 2025-05-19 DIAGNOSIS — G89.29 CHRONIC PAIN OF LEFT KNEE: ICD-10-CM

## 2025-05-19 DIAGNOSIS — M25.562 CHRONIC PAIN OF LEFT KNEE: ICD-10-CM

## 2025-05-19 PROCEDURE — 97110 THERAPEUTIC EXERCISES: CPT | Mod: GP

## 2025-05-19 PROCEDURE — 2500000005 HC RX 250 GENERAL PHARMACY W/O HCPCS: Performed by: NURSE ANESTHETIST, CERTIFIED REGISTERED

## 2025-05-19 PROCEDURE — 73560 X-RAY EXAM OF KNEE 1 OR 2: CPT | Mod: LT

## 2025-05-19 PROCEDURE — 3600000018 HC OR TIME - INITIAL BASE CHARGE - PROCEDURE LEVEL SIX: Performed by: ORTHOPAEDIC SURGERY

## 2025-05-19 PROCEDURE — 96372 THER/PROPH/DIAG INJ SC/IM: CPT | Performed by: ORTHOPAEDIC SURGERY

## 2025-05-19 PROCEDURE — 7100000010 HC PHASE TWO TIME - EACH INCREMENTAL 1 MINUTE: Performed by: ORTHOPAEDIC SURGERY

## 2025-05-19 PROCEDURE — C1776 JOINT DEVICE (IMPLANTABLE): HCPCS | Performed by: ORTHOPAEDIC SURGERY

## 2025-05-19 PROCEDURE — 97161 PT EVAL LOW COMPLEX 20 MIN: CPT | Mod: GP

## 2025-05-19 PROCEDURE — 2500000001 HC RX 250 WO HCPCS SELF ADMINISTERED DRUGS (ALT 637 FOR MEDICARE OP): Performed by: ORTHOPAEDIC SURGERY

## 2025-05-19 PROCEDURE — 2500000004 HC RX 250 GENERAL PHARMACY W/ HCPCS (ALT 636 FOR OP/ED): Performed by: NURSE ANESTHETIST, CERTIFIED REGISTERED

## 2025-05-19 PROCEDURE — 7100000002 HC RECOVERY ROOM TIME - EACH INCREMENTAL 1 MINUTE: Performed by: ORTHOPAEDIC SURGERY

## 2025-05-19 PROCEDURE — 3700000001 HC GENERAL ANESTHESIA TIME - INITIAL BASE CHARGE: Performed by: ORTHOPAEDIC SURGERY

## 2025-05-19 PROCEDURE — 3700000002 HC GENERAL ANESTHESIA TIME - EACH INCREMENTAL 1 MINUTE: Performed by: ORTHOPAEDIC SURGERY

## 2025-05-19 PROCEDURE — RXMED WILLOW AMBULATORY MEDICATION CHARGE

## 2025-05-19 PROCEDURE — 2500000004 HC RX 250 GENERAL PHARMACY W/ HCPCS (ALT 636 FOR OP/ED): Mod: JZ | Performed by: ANESTHESIOLOGY

## 2025-05-19 PROCEDURE — 7100000009 HC PHASE TWO TIME - INITIAL BASE CHARGE: Performed by: ORTHOPAEDIC SURGERY

## 2025-05-19 PROCEDURE — 2720000007 HC OR 272 NO HCPCS: Performed by: ORTHOPAEDIC SURGERY

## 2025-05-19 PROCEDURE — 2500000004 HC RX 250 GENERAL PHARMACY W/ HCPCS (ALT 636 FOR OP/ED): Performed by: ORTHOPAEDIC SURGERY

## 2025-05-19 PROCEDURE — 7100000001 HC RECOVERY ROOM TIME - INITIAL BASE CHARGE: Performed by: ORTHOPAEDIC SURGERY

## 2025-05-19 PROCEDURE — 2780000003 HC OR 278 NO HCPCS: Performed by: ORTHOPAEDIC SURGERY

## 2025-05-19 PROCEDURE — 27447 TOTAL KNEE ARTHROPLASTY: CPT | Performed by: ORTHOPAEDIC SURGERY

## 2025-05-19 PROCEDURE — A9999 DME SUPPLY OR ACCESSORY, NOS: HCPCS | Performed by: ORTHOPAEDIC SURGERY

## 2025-05-19 PROCEDURE — 3600000017 HC OR TIME - EACH INCREMENTAL 1 MINUTE - PROCEDURE LEVEL SIX: Performed by: ORTHOPAEDIC SURGERY

## 2025-05-19 PROCEDURE — 2500000005 HC RX 250 GENERAL PHARMACY W/O HCPCS: Mod: JZ | Performed by: ORTHOPAEDIC SURGERY

## 2025-05-19 PROCEDURE — 2500000004 HC RX 250 GENERAL PHARMACY W/ HCPCS (ALT 636 FOR OP/ED): Performed by: ANESTHESIOLOGY

## 2025-05-19 DEVICE — ATTUNE KNEE SYSTEM TIBIAL INSERT ROTATING PLATFORM POSTERIOR STABILIZED SIZE 3 12MM AOX
Type: IMPLANTABLE DEVICE | Site: PATELLA | Status: FUNCTIONAL
Brand: ATTUNE

## 2025-05-19 DEVICE — IMPLANTABLE DEVICE: Type: IMPLANTABLE DEVICE | Site: PATELLA | Status: FUNCTIONAL

## 2025-05-19 RX ORDER — OXYCODONE HYDROCHLORIDE 5 MG/1
5 TABLET ORAL EVERY 4 HOURS PRN
Status: DISCONTINUED | OUTPATIENT
Start: 2025-05-19 | End: 2025-05-19 | Stop reason: HOSPADM

## 2025-05-19 RX ORDER — SODIUM CHLORIDE, SODIUM LACTATE, POTASSIUM CHLORIDE, CALCIUM CHLORIDE 600; 310; 30; 20 MG/100ML; MG/100ML; MG/100ML; MG/100ML
20 INJECTION, SOLUTION INTRAVENOUS CONTINUOUS
Status: DISCONTINUED | OUTPATIENT
Start: 2025-05-19 | End: 2025-05-19 | Stop reason: HOSPADM

## 2025-05-19 RX ORDER — SODIUM CHLORIDE, SODIUM LACTATE, POTASSIUM CHLORIDE, CALCIUM CHLORIDE 600; 310; 30; 20 MG/100ML; MG/100ML; MG/100ML; MG/100ML
100 INJECTION, SOLUTION INTRAVENOUS CONTINUOUS
Status: DISCONTINUED | OUTPATIENT
Start: 2025-05-19 | End: 2025-05-19 | Stop reason: HOSPADM

## 2025-05-19 RX ORDER — ONDANSETRON HYDROCHLORIDE 2 MG/ML
4 INJECTION, SOLUTION INTRAVENOUS ONCE AS NEEDED
Status: COMPLETED | OUTPATIENT
Start: 2025-05-19 | End: 2025-05-19

## 2025-05-19 RX ORDER — NORETHINDRONE AND ETHINYL ESTRADIOL 0.5-0.035
KIT ORAL AS NEEDED
Status: DISCONTINUED | OUTPATIENT
Start: 2025-05-19 | End: 2025-05-19

## 2025-05-19 RX ORDER — MEPERIDINE HYDROCHLORIDE 25 MG/ML
12.5 INJECTION INTRAMUSCULAR; INTRAVENOUS; SUBCUTANEOUS EVERY 10 MIN PRN
Status: DISCONTINUED | OUTPATIENT
Start: 2025-05-19 | End: 2025-05-19 | Stop reason: HOSPADM

## 2025-05-19 RX ORDER — CEFAZOLIN SODIUM 2 G/100ML
2 INJECTION, SOLUTION INTRAVENOUS ONCE
Status: COMPLETED | OUTPATIENT
Start: 2025-05-19 | End: 2025-05-19

## 2025-05-19 RX ORDER — DIPHENHYDRAMINE HYDROCHLORIDE 50 MG/ML
12.5 INJECTION, SOLUTION INTRAMUSCULAR; INTRAVENOUS ONCE AS NEEDED
Status: DISCONTINUED | OUTPATIENT
Start: 2025-05-19 | End: 2025-05-19 | Stop reason: HOSPADM

## 2025-05-19 RX ORDER — LIDOCAINE HYDROCHLORIDE 20 MG/ML
INJECTION, SOLUTION INFILTRATION; PERINEURAL AS NEEDED
Status: DISCONTINUED | OUTPATIENT
Start: 2025-05-19 | End: 2025-05-19

## 2025-05-19 RX ORDER — KETOROLAC TROMETHAMINE 30 MG/ML
INJECTION, SOLUTION INTRAMUSCULAR; INTRAVENOUS AS NEEDED
Status: DISCONTINUED | OUTPATIENT
Start: 2025-05-19 | End: 2025-05-19 | Stop reason: HOSPADM

## 2025-05-19 RX ORDER — MAGNESIUM SULFATE HEPTAHYDRATE 500 MG/ML
INJECTION, SOLUTION INTRAMUSCULAR; INTRAVENOUS AS NEEDED
Status: DISCONTINUED | OUTPATIENT
Start: 2025-05-19 | End: 2025-05-19

## 2025-05-19 RX ORDER — FENTANYL CITRATE 50 UG/ML
INJECTION, SOLUTION INTRAMUSCULAR; INTRAVENOUS AS NEEDED
Status: DISCONTINUED | OUTPATIENT
Start: 2025-05-19 | End: 2025-05-19

## 2025-05-19 RX ORDER — MIDAZOLAM HYDROCHLORIDE 1 MG/ML
INJECTION, SOLUTION INTRAMUSCULAR; INTRAVENOUS AS NEEDED
Status: DISCONTINUED | OUTPATIENT
Start: 2025-05-19 | End: 2025-05-19

## 2025-05-19 RX ORDER — DEXMEDETOMIDINE IN 0.9 % NACL 20 MCG/5ML
SYRINGE (ML) INTRAVENOUS AS NEEDED
Status: DISCONTINUED | OUTPATIENT
Start: 2025-05-19 | End: 2025-05-19

## 2025-05-19 RX ORDER — ACETAMINOPHEN 325 MG/1
975 TABLET ORAL ONCE
Status: COMPLETED | OUTPATIENT
Start: 2025-05-19 | End: 2025-05-19

## 2025-05-19 RX ORDER — ROPIVACAINE HYDROCHLORIDE 5 MG/ML
INJECTION, SOLUTION EPIDURAL; INFILTRATION; PERINEURAL AS NEEDED
Status: DISCONTINUED | OUTPATIENT
Start: 2025-05-19 | End: 2025-05-19 | Stop reason: HOSPADM

## 2025-05-19 RX ORDER — PHENYLEPHRINE HCL IN 0.9% NACL 0.4MG/10ML
SYRINGE (ML) INTRAVENOUS AS NEEDED
Status: DISCONTINUED | OUTPATIENT
Start: 2025-05-19 | End: 2025-05-19

## 2025-05-19 RX ORDER — TRANEXAMIC ACID 10 MG/ML
INJECTION, SOLUTION INTRAVENOUS AS NEEDED
Status: DISCONTINUED | OUTPATIENT
Start: 2025-05-19 | End: 2025-05-19

## 2025-05-19 RX ORDER — CELECOXIB 400 MG/1
400 CAPSULE ORAL ONCE
Status: COMPLETED | OUTPATIENT
Start: 2025-05-19 | End: 2025-05-19

## 2025-05-19 RX ORDER — ALBUTEROL SULFATE 0.83 MG/ML
2.5 SOLUTION RESPIRATORY (INHALATION) ONCE AS NEEDED
Status: DISCONTINUED | OUTPATIENT
Start: 2025-05-19 | End: 2025-05-19 | Stop reason: HOSPADM

## 2025-05-19 RX ORDER — EPINEPHRINE 1 MG/ML
INJECTION INTRAMUSCULAR; INTRAVENOUS; SUBCUTANEOUS AS NEEDED
Status: DISCONTINUED | OUTPATIENT
Start: 2025-05-19 | End: 2025-05-19 | Stop reason: HOSPADM

## 2025-05-19 RX ORDER — GLYCOPYRROLATE 0.2 MG/ML
INJECTION INTRAMUSCULAR; INTRAVENOUS AS NEEDED
Status: DISCONTINUED | OUTPATIENT
Start: 2025-05-19 | End: 2025-05-19

## 2025-05-19 RX ORDER — PROPOFOL 10 MG/ML
INJECTION, EMULSION INTRAVENOUS AS NEEDED
Status: DISCONTINUED | OUTPATIENT
Start: 2025-05-19 | End: 2025-05-19

## 2025-05-19 RX ORDER — ONDANSETRON HYDROCHLORIDE 2 MG/ML
INJECTION, SOLUTION INTRAVENOUS AS NEEDED
Status: DISCONTINUED | OUTPATIENT
Start: 2025-05-19 | End: 2025-05-19

## 2025-05-19 RX ORDER — MORPHINE SULFATE 10 MG/ML
INJECTION, SOLUTION INTRAMUSCULAR; INTRAVENOUS AS NEEDED
Status: DISCONTINUED | OUTPATIENT
Start: 2025-05-19 | End: 2025-05-19 | Stop reason: HOSPADM

## 2025-05-19 RX ORDER — SODIUM CHLORIDE 0.9 G/100ML
INJECTION, SOLUTION IRRIGATION AS NEEDED
Status: DISCONTINUED | OUTPATIENT
Start: 2025-05-19 | End: 2025-05-19 | Stop reason: HOSPADM

## 2025-05-19 RX ORDER — DROPERIDOL 2.5 MG/ML
0.62 INJECTION, SOLUTION INTRAMUSCULAR; INTRAVENOUS ONCE AS NEEDED
Status: DISCONTINUED | OUTPATIENT
Start: 2025-05-19 | End: 2025-05-19 | Stop reason: HOSPADM

## 2025-05-19 RX ORDER — SODIUM CHLORIDE 9 MG/ML
INJECTION, SOLUTION INTRAMUSCULAR; INTRAVENOUS; SUBCUTANEOUS AS NEEDED
Status: DISCONTINUED | OUTPATIENT
Start: 2025-05-19 | End: 2025-05-19 | Stop reason: HOSPADM

## 2025-05-19 RX ADMIN — Medication 20 MG: at 09:14

## 2025-05-19 RX ADMIN — PROPOFOL 30 MG: 10 INJECTION, EMULSION INTRAVENOUS at 10:47

## 2025-05-19 RX ADMIN — Medication 80 MCG: at 09:48

## 2025-05-19 RX ADMIN — SODIUM CHLORIDE, POTASSIUM CHLORIDE, SODIUM LACTATE AND CALCIUM CHLORIDE 20 ML/HR: 600; 310; 30; 20 INJECTION, SOLUTION INTRAVENOUS at 07:44

## 2025-05-19 RX ADMIN — MIDAZOLAM 2 MG: 1 INJECTION INTRAMUSCULAR; INTRAVENOUS at 08:54

## 2025-05-19 RX ADMIN — ACETAMINOPHEN 975 MG: 325 TABLET ORAL at 07:45

## 2025-05-19 RX ADMIN — TRANEXAMIC ACID 1000 MG: 10 INJECTION, SOLUTION INTRAVENOUS at 09:15

## 2025-05-19 RX ADMIN — FENTANYL CITRATE 25 MCG: 50 INJECTION, SOLUTION INTRAMUSCULAR; INTRAVENOUS at 10:22

## 2025-05-19 RX ADMIN — EPHEDRINE SULFATE 5 MG: 50 INJECTION, SOLUTION INTRAVENOUS at 10:08

## 2025-05-19 RX ADMIN — Medication 8 MCG: at 09:33

## 2025-05-19 RX ADMIN — CEFAZOLIN SODIUM 2 G: 2 INJECTION, SOLUTION INTRAVENOUS at 09:18

## 2025-05-19 RX ADMIN — ONDANSETRON 4 MG: 2 INJECTION, SOLUTION INTRAMUSCULAR; INTRAVENOUS at 11:28

## 2025-05-19 RX ADMIN — Medication 8 MCG: at 09:20

## 2025-05-19 RX ADMIN — ONDANSETRON 4 MG: 2 INJECTION INTRAMUSCULAR; INTRAVENOUS at 09:20

## 2025-05-19 RX ADMIN — GLYCOPYRROLATE 0.2 MG: 0.2 INJECTION INTRAMUSCULAR; INTRAVENOUS at 09:21

## 2025-05-19 RX ADMIN — Medication 40 MCG: at 09:35

## 2025-05-19 RX ADMIN — PROPOFOL 50 MCG/KG/MIN: 10 INJECTION, EMULSION INTRAVENOUS at 09:12

## 2025-05-19 RX ADMIN — PROPOFOL 10 MG: 10 INJECTION, EMULSION INTRAVENOUS at 09:33

## 2025-05-19 RX ADMIN — HYDROMORPHONE HYDROCHLORIDE 0.5 MG: 1 INJECTION, SOLUTION INTRAMUSCULAR; INTRAVENOUS; SUBCUTANEOUS at 11:27

## 2025-05-19 RX ADMIN — Medication 120 MCG: at 09:59

## 2025-05-19 RX ADMIN — Medication 40 MCG: at 09:54

## 2025-05-19 RX ADMIN — SODIUM CHLORIDE, POTASSIUM CHLORIDE, SODIUM LACTATE AND CALCIUM CHLORIDE: 600; 310; 30; 20 INJECTION, SOLUTION INTRAVENOUS at 10:42

## 2025-05-19 RX ADMIN — LIDOCAINE HYDROCHLORIDE 2 ML: 20 INJECTION, SOLUTION INFILTRATION; PERINEURAL at 09:14

## 2025-05-19 RX ADMIN — LIDOCAINE HYDROCHLORIDE 1 ML: 20 INJECTION, SOLUTION INFILTRATION; PERINEURAL at 09:20

## 2025-05-19 RX ADMIN — PROPOFOL 20 MG: 10 INJECTION, EMULSION INTRAVENOUS at 10:15

## 2025-05-19 RX ADMIN — PROPOFOL 30 MG: 10 INJECTION, EMULSION INTRAVENOUS at 09:14

## 2025-05-19 RX ADMIN — Medication 80 MCG: at 10:24

## 2025-05-19 RX ADMIN — EPHEDRINE SULFATE 5 MG: 50 INJECTION, SOLUTION INTRAVENOUS at 10:19

## 2025-05-19 RX ADMIN — PROPOFOL 30 MG: 10 INJECTION, EMULSION INTRAVENOUS at 10:56

## 2025-05-19 RX ADMIN — Medication 80 MCG: at 09:44

## 2025-05-19 RX ADMIN — FENTANYL CITRATE 25 MCG: 50 INJECTION, SOLUTION INTRAMUSCULAR; INTRAVENOUS at 09:57

## 2025-05-19 RX ADMIN — POVIDONE-IODINE 1 APPLICATION: 5 SOLUTION TOPICAL at 07:44

## 2025-05-19 RX ADMIN — CELECOXIB 400 MG: 400 CAPSULE ORAL at 07:44

## 2025-05-19 RX ADMIN — TRANEXAMIC ACID 1000 MG: 10 INJECTION, SOLUTION INTRAVENOUS at 10:20

## 2025-05-19 RX ADMIN — MAGNESIUM SULFATE HEPTAHYDRATE 1 G: 500 INJECTION, SOLUTION INTRAMUSCULAR; INTRAVENOUS at 10:03

## 2025-05-19 RX ADMIN — Medication 40 MCG: at 09:27

## 2025-05-19 ASSESSMENT — PAIN SCALES - GENERAL
PAINLEVEL_OUTOF10: 6
PAINLEVEL_OUTOF10: 3
PAINLEVEL_OUTOF10: 6
PAINLEVEL_OUTOF10: 4
PAINLEVEL_OUTOF10: 0 - NO PAIN
PAINLEVEL_OUTOF10: 8
PAINLEVEL_OUTOF10: 5 - MODERATE PAIN
PAINLEVEL_OUTOF10: 3
PAINLEVEL_OUTOF10: 4

## 2025-05-19 ASSESSMENT — COGNITIVE AND FUNCTIONAL STATUS - GENERAL
CLIMB 3 TO 5 STEPS WITH RAILING: A LITTLE
WALKING IN HOSPITAL ROOM: A LITTLE
STANDING UP FROM CHAIR USING ARMS: A LITTLE
MOBILITY SCORE: 20
MOVING TO AND FROM BED TO CHAIR: A LITTLE

## 2025-05-19 ASSESSMENT — PAIN - FUNCTIONAL ASSESSMENT
PAIN_FUNCTIONAL_ASSESSMENT: 0-10

## 2025-05-19 ASSESSMENT — PAIN DESCRIPTION - DESCRIPTORS
DESCRIPTORS: ACHING
DESCRIPTORS: DISCOMFORT
DESCRIPTORS: ACHING;DISCOMFORT
DESCRIPTORS: ACHING
DESCRIPTORS: DISCOMFORT

## 2025-05-19 ASSESSMENT — PAIN DESCRIPTION - ORIENTATION: ORIENTATION: LEFT

## 2025-05-19 ASSESSMENT — PAIN DESCRIPTION - LOCATION: LOCATION: KNEE

## 2025-05-19 ASSESSMENT — ACTIVITIES OF DAILY LIVING (ADL): ADL_ASSISTANCE: INDEPENDENT

## 2025-05-19 NOTE — HH CARE COORDINATION
Home Care received a Referral for Physical Therapy. We have processed the referral for a Start of Care on 5/20/25.     If you have any questions or concerns, please feel free to contact us at [phone]. Follow the prompts, enter your five digit zip code, and you will be directed to your care team on [facility].

## 2025-05-19 NOTE — ANESTHESIA PROCEDURE NOTES
Spinal Block    Patient location during procedure: OR  Start time: 5/19/2025 8:54 AM  End time: 5/19/2025 9:11 AM  Reason for block: primary anesthetic  Staffing  Performed: CRNA   Authorized by: Edu Mancilla DO    Performed by: AVA Desir    Preanesthetic Checklist  Completed: patient identified, IV checked, risks and benefits discussed, surgical consent, pre-op evaluation, timeout performed and sterile techniques followed  Block Timeout  RN/Licensed healthcare professional reads aloud to the Anesthesia provider and entire team: Patient identity, procedure with side and site, patient position, and as applicable the availability of implants/special equipment/special requirements.  Patient on coagulant treatment: no  Timeout performed at: 5/19/2025 9:08 AM  Spinal Block  Patient position: sitting  Prep: Betadine  Sterility prep: cap, drape, gloves, gown, hand hygiene and mask  Sedation level: no sedation  Patient monitoring: continuous pulse oximetry and blood pressure  Approach: midline  Vertebral space: L4-5  Injection technique: single-shot  Needle  Needle type: pencil-point   Needle gauge: 27 G  Needle length: 5 in  Free flowing CSF: yes    Assessment  Sensory level: T10 bilateral  Block outcome: Allis test negative  Events: paresthesia  Paresthesia: left  Paresthesia resolved: yes  Procedure assessment: patient tolerated procedure well with no immediate complications  Additional Notes  Pt difficult spinal due to scoliosis and disk degenerative disease, repositioned pt after first attempt and then placed right arm over chest on lap after second attempt and then was able to find dural space and get CSF, no blood aspiration and patient tolerated procedure well. Pt had right curve on back.

## 2025-05-19 NOTE — ANESTHESIA PROCEDURE NOTES
Peripheral Block    Patient location during procedure: pre-op  Reason for block: at surgeon's request and post-op pain management  Staffing  Performed: attending   Authorized by: Edu Mancilla DO    Performed by: Edu Mancilla DO  Preanesthetic Checklist  Completed: patient identified, IV checked, site marked, risks and benefits discussed, surgical consent, monitors and equipment checked, pre-op evaluation and timeout performed   Timeout performed at:   Peripheral Block  Patient position: laying flat  Prep: ChloraPrep  Patient monitoring: continuous pulse ox  Block type: adductor canal  Laterality: left  Injection technique: single-shot  Guidance: nerve stimulator and ultrasound guided  Local infiltration: ropivacaine  Infiltration strength: 0.5 %  Dose: 20 mL  Needle  Needle type: pencil-tip   Needle gauge: 22 G  Needle length: 10 cm  Needle localization: nerve stimulator and ultrasound guidance  Assessment  Injection assessment: no paresthesia on injection, negative aspiration for heme, incremental injection and local visualized surrounding nerve on ultrasound  Paresthesia pain: none  Heart rate change: no  Slow fractionated injection: yes

## 2025-05-19 NOTE — ANESTHESIA PREPROCEDURE EVALUATION
Patient: Nataliia Pandya    Procedure Information       Date/Time: 05/19/25 0835    Procedure: KNEE REPLACEMENT TOTAL HYBRID UNILAT (Left: Knee) - depuy attune    Location: [facility] OR 03 / [facility] OR    Surgeons: Kevin Miramontes MD            Relevant Problems   No relevant active problems       Clinical information reviewed:   Tobacco  Allergies  Meds   Med Hx  Surg Hx  OB Status  Fam Hx  Soc   Hx        NPO Detail:  NPO/Void Status  Carbohydrate Drink Given Prior to Surgery? : N  Date of Last Liquid: 05/18/25  Time of Last Liquid: 2300  Date of Last Solid: 05/18/25  Time of Last Solid: 2300  Last Intake Type: Clear fluids  Time of Last Void: 0710         Physical Exam    Airway  Mallampati: I  TM distance: >3 FB  Neck ROM: full  Mouth opening: 3 or more finger widths     Cardiovascular - normal exam   Dental    Pulmonary - normal exam   Abdominal            Anesthesia Plan    History of general anesthesia?: yes  History of complications of general anesthesia?: no    ASA 3     regional, MAC and spinal     intravenous induction   Anesthetic plan and risks discussed with patient.  Use of blood products discussed with patient who.    Plan discussed with CRNA and attending.      Patient cleared by cardiology  Was seen in PAT

## 2025-05-19 NOTE — PROGRESS NOTES
Physical Therapy    Physical Therapy Evaluation & Treatment    Patient Name: Nataliia Pandya  MRN: [id number]  Department: [facility] PACU  Room: [facility] OR  Today's Date: 5/19/2025   Time Calculation  Start Time: 1335  Stop Time: 1403  Time Calculation (min): 28 min    Assessment/Plan   PT Assessment  PT Assessment Results: Decreased strength, Decreased range of motion, Decreased endurance, Impaired balance, Decreased mobility  Rehab Prognosis: Excellent  Barriers to Discharge Home: No anticipated barriers  Evaluation/Treatment Tolerance: Patient tolerated treatment well  Medical Staff Made Aware: Yes  Strengths: Ability to acquire knowledge, Housing layout, Insight into problems, Support of Caregivers  Barriers to Participation: Comorbidities  End of Session Communication: Bedside nurse  Assessment Comment: Patient tolerates mobility well, eager to improve, strong family support, first floor set up, sized and issued 2WW for home going. Rec LOW intensity PT intervention  End of Session Patient Position: Up in chair, Alarm off, caregiver present (prepared for d/c, RN at bedside)      PT Plan  Treatment/Interventions: Bed mobility, Transfer training, Gait training, Stair training, Balance training, Strengthening, Endurance training, Range of motion, Therapeutic exercise  PT Plan: Ongoing PT  PT Eval Only Reason: Safe to return home  PT Frequency: 2 times per week  PT Discharge Recommendations: Low intensity level of continued care  Equipment Recommended upon Discharge: Wheeled walker (sized and issued for home going)  PT Recommended Transfer Status: Assist x1  PT - OK to Discharge: Yes (per PT POC)      Subjective     PT Visit Info:  PT Received On: 05/19/25  General Visit Information:  General  Reason for Referral: Impaired functional mobility; L TKA  Referred By: Kevin Miramontes  Past Medical History Relevant to Rehab: COPD, HPL, HTN, PONV, sleep apnea  Family/Caregiver Present: Yes (dtr and VALENTINO)  Prior to Session  Communication: Bedside nurse  Patient Position Received: Bed, 3 rail up, Alarm off, caregiver present (PACU)  General Comment: Pleasant, cooperative and agreeable to PT eval  Home Living:  Home Living  Type of Home: House  Lives With: Spouse  Home Adaptive Equipment:  (rollator and quad cane)  Home Layout: One level  Home Access: Ramped entrance  Bathroom Shower/Tub: Walk-in shower  Prior Level of Function:  Prior Function Per Pt/Caregiver Report  Level of [place]: Independent with ADLs and functional transfers, Independent with homemaking with ambulation  ADL Assistance: Independent  Homemaking Assistance: Independent  Ambulatory Assistance: Independent (quad cane)  Precautions:  Precautions  LE Weight Bearing Status: Weight Bearing as Tolerated  Medical Precautions: Fall precautions  Post-Surgical Precautions: Left total knee precautions  Precautions Comment: ace wrap L LE             Objective   Pain:  Pain Assessment  Pain Assessment: 0-10  0-10 (Numeric) Pain Score: 0 - No pain  Cognition:  Cognition  Overall Cognitive Status: Within Functional Limits  Orientation Level: Oriented X4    General Assessments:    Activity Tolerance  Endurance: Endurance does not limit participation in activity    Sensation  Light Touch: No apparent deficits    Strength  Strength Comments: R LE 4+/5, L hip 4+/5, L knee 3+/5, L ankle 4+/5    Coordination  Movements are Fluid and Coordinated: Yes    Postural Control  Postural Control: Within Functional Limits    Static Sitting Balance  Static Sitting-Balance Support: No upper extremity supported, Feet supported  Static Sitting-Level of Assistance: Independent  Dynamic Sitting Balance  Dynamic Sitting-Balance Support: No upper extremity supported, Feet supported  Dynamic Sitting-Level of Assistance: Independent  Dynamic Sitting-Balance: Forward lean    Static Standing Balance  Static Standing-Balance Support: Bilateral upper extremity supported  Static Standing-Level of  Assistance: Contact guard  Dynamic Standing Balance  Dynamic Standing-Balance Support: Bilateral upper extremity supported  Dynamic Standing-Level of Assistance: Contact guard  Dynamic Standing-Balance: Turning  Functional Assessments:       Bed Mobility  Bed Mobility: Yes  Bed Mobility 1  Bed Mobility 1: Supine to sitting  Level of Assistance 1: Modified independent  Bed Mobility Comments 1: use of rail    Transfers  Transfer: Yes  Transfer 1  Transfer From 1: Sit to, Stand to  Transfer to 1: Sit, Stand  Technique 1: Sit to stand, Stand to sit  Transfer Device 1: Walker  Transfer Level of Assistance 1: Contact guard    Ambulation/Gait Training  Ambulation/Gait Training Performed: Yes  Ambulation/Gait Training 1  Surface 1: Level tile  Device 1: Rolling walker  Gait Support Devices: Gait belt  Assistance 1: Contact guard  Quality of Gait 1:  (step to pattern, decreased nora)  Comments/Distance (ft) 1: 15'; 40'    Stairs  Stairs: No (No stairs to negotiate at home)    Treatments:   Patient encouraged to complete supine ther ex 3x/day. Reviewed 15  reps each: ankle pumps, quad sets, SLR, hip abd/add, heel slides, SAQ. Patient advised home care will advance ther ex as tolerated   Encouraged to take short duration ambulation bouts hourly during waking hours with the use of 2WW, focusing on gait pattern.   Educated on the importance of avoiding remaining in one position for extended period of time. Encouraged pain and edema control with use of ice, elevation and activity pacing. Patient verbalized understanding.   Reiterated no pillows/towels etc under the knee   Reviewed TKA precautions   Home going packet reviewed and provided to patient. Patient verbalized understanding.     Reviewed car transfer, patient/dtr/VALENTINO verbalized understanding. Recommended pushing seat back as far as possible, don't use door as support, recline seat to provide increased space for hip mobility. Sit first and then swivel into vehicle.  Plans to d/c into a small truck.     Bed Mobility  Bed Mobility: Yes  Bed Mobility 1  Bed Mobility 1: Supine to sitting  Level of Assistance 1: Modified independent  Bed Mobility Comments 1: use of rail    Ambulation/Gait Training  Ambulation/Gait Training Performed: Yes  Ambulation/Gait Training 1  Surface 1: Level tile  Device 1: Rolling walker  Gait Support Devices: Gait belt  Assistance 1: Contact guard  Quality of Gait 1:  (step to pattern, decreased nora)  Comments/Distance (ft) 1: 15'; 40'  Transfers  Transfer: Yes  Transfer 1  Transfer From 1: Sit to, Stand to  Transfer to 1: Sit, Stand  Technique 1: Sit to stand, Stand to sit  Transfer Device 1: Walker  Transfer Level of Assistance 1: Contact guard    Stairs  Stairs: No (No stairs to negotiate at home)  Outcome Measures:  [facility] Basic Mobility  Turning from your back to your side while in a flat bed without using bedrails: None  Moving from lying on your back to sitting on the side of a flat bed without using bedrails: None  Moving to and from bed to chair (including a wheelchair): A little  Standing up from a chair using your arms (e.g. wheelchair or bedside chair): A little  To walk in hospital room: A little  Climbing 3-5 steps with railing: A little  Basic Mobility - Total Score: 20    Encounter Problems       Encounter Problems (Resolved)       Mobility       Patient will demonstrate good understanding of bed mobility, transfers, ambulation and HEP for safe home going.   (Met)       Start:  05/19/25    Expected End:  05/19/25    Resolved:  05/19/25                Education Documentation  Handouts, taught by Lorna Foreman PT at 5/19/2025  3:02 PM.  Learner: Family, Patient  Readiness: Acceptance  Method: Explanation, Demonstration, Handout  Response: Verbalizes Understanding, Demonstrated Understanding  Comment: Importance of 2WW use, ice and elevation for edema control, use of AE for dressing (surgical side first), importance of HEP and ambulation,  maintaining precautions.    Precautions, taught by Lorna Foreman PT at 5/19/2025  3:02 PM.  Learner: Family, Patient  Readiness: Acceptance  Method: Explanation, Demonstration, Handout  Response: Verbalizes Understanding, Demonstrated Understanding  Comment: Importance of 2WW use, ice and elevation for edema control, use of AE for dressing (surgical side first), importance of HEP and ambulation, maintaining precautions.    Home Exercise Program, taught by Lorna Foreman PT at 5/19/2025  3:02 PM.  Learner: Family, Patient  Readiness: Acceptance  Method: Explanation, Demonstration, Handout  Response: Verbalizes Understanding, Demonstrated Understanding  Comment: Importance of 2WW use, ice and elevation for edema control, use of AE for dressing (surgical side first), importance of HEP and ambulation, maintaining precautions.    Mobility Training, taught by Lorna Foreman PT at 5/19/2025  3:02 PM.  Learner: Family, Patient  Readiness: Acceptance  Method: Explanation, Demonstration, Handout  Response: Verbalizes Understanding, Demonstrated Understanding  Comment: Importance of 2WW use, ice and elevation for edema control, use of AE for dressing (surgical side first), importance of HEP and ambulation, maintaining precautions.    Body Mechanics, taught by Lorna Foreman PT at 5/19/2025  3:02 PM.  Learner: Family, Patient  Readiness: Acceptance  Method: Explanation, Demonstration, Handout  Response: Verbalizes Understanding, Demonstrated Understanding  Comment: Importance of 2WW use, ice and elevation for edema control, use of AE for dressing (surgical side first), importance of HEP and ambulation, maintaining precautions.    Education Comments  No comments found.

## 2025-05-19 NOTE — ANESTHESIA POSTPROCEDURE EVALUATION
Patient: Nataliia Pandya    Procedure Summary       Date: 05/19/25 Room / Location: [facility] OR 03 / [facility]    Anesthesia Start: 0855 Anesthesia Stop: 1118    Procedure: KNEE REPLACEMENT TOTAL HYBRID UNILAT (Left: Knee) Diagnosis:       Left knee pain, unspecified chronicity      (Left knee pain, unspecified chronicity [M25.562])    Surgeons: Kevin Miramontes MD Responsible Provider: Edu Mancilla DO    Anesthesia Type: regional, MAC, spinal ASA Status: 3            Anesthesia Type: regional, MAC, spinal    Vitals Value Taken Time   /58 05/19/25 11:30   Temp 36.1 °C (97 °F) 05/19/25 11:08   Pulse 72 05/19/25 11:30   Resp 15 05/19/25 11:30   SpO2 96 % 05/19/25 11:45       Anesthesia Post Evaluation    Patient location during evaluation: PACU  Patient participation: complete - patient participated  Level of consciousness: awake and alert  Pain management: adequate  Airway patency: patent  Cardiovascular status: acceptable  Respiratory status: acceptable  Hydration status: acceptable  Postoperative Nausea and Vomiting: none        There were no known notable events for this encounter.

## 2025-05-19 NOTE — OP NOTE
KNEE REPLACEMENT TOTAL HYBRID UNILAT (L) Operative Note     Date: 2025  OR Location: [facility]    Name: Nataliia Pandya, : 1965, Age: 59 y.o., MRN: [id number], Sex: female    Diagnosis  Pre-op Diagnosis      * Left knee pain, unspecified chronicity [M25.562] Post-op Diagnosis     * Left knee pain, unspecified chronicity [M25.562]     Procedures  KNEE REPLACEMENT TOTAL HYBRID UNILAT  84730 - [place] ARTHRP KNE CONDYLE&PLATU MEDIAL&LAT COMPARTMENTS      Surgeons      * Kevin Miramontes - Primary    Resident/Fellow/Other Assistant:  Surgeons and Role:  * No surgeons found with a matching role *    Staff:   Circulator: Katiuska Skinner Person: Benigno  Surgical Assistant: Bubba  Surgical Assistant: Radha    Anesthesia Staff: Anesthesiologist: Edu Mancilla DO  CRNA: MARK Desir-CRNA    Procedure Summary  Anesthesia: Regional, Monitor Anesthesia Care, Spinal  ASA: III  Estimated Blood Loss: 50mL  Intra-op Medications:   Administrations occurring from 0835 to 1125 on 25:   Medication Name Total Dose   ropivacaine (Naropin) 5 mg/mL (0.5 %) injection 30 mL   EPINEPHrine (Adrenalin) injection 0.2 mg   sodium chloride (PF) 0.9% solution 18 mL   ketorolac (Toradol) injection 30 mg   morphine injection 5 mg   sodium chloride 0.9 % irrigation solution 3,000 mL   dexMEDETOMidine 4 mcg/mL in NS syringe 16 mcg   ePHEDrine injection 10 mg   fentaNYL PF 0.05 mg/mL 50 mcg   glycopyrrolate (Robinul) 0.2 mg/mL injection VIAL 0.2 mg   ketamine injection 50 mg/ 5 mL (10 mg/mL) 20 mg   lidocaine (Xylocaine) 2 % 3 mL   magnesium sulfate 50 % injection 1 g   midazolam (Versed) injection 1 mg/mL 2 mg   ondansetron (Zofran) 2 mg/mL injection 4 mg   phenylephrine 40 mcg/mL syringe 10 mL 480 mcg   propofol (Diprivan) injection 10 mg/mL 708.6 mg   tranexamic acid IV 1,000 mg in 100 mL NaCl (iso) - premix 2,000 mg   ceFAZolin (Ancef) 2 g in dextrose (iso)  mL 2 g              Anesthesia Record               Intraprocedure I/O  Totals          Intake    Tranexamic Acid 0.00 mL    The total shown is the total volume documented since Anesthesia Start was filed.    Total Intake 0 mL          Specimen:   ID Type Source Tests Collected by Time   1 : LEFT KNEE CONTENTS Tissue KNEE ARTHROPLASTY LEFT SURGICAL PATHOLOGY EXAM Kvein Miramontes MD 2025 0971                 Drains and/or Catheters: * None in log *    Tourniquet Times:   * Missing tourniquet times found for documented tourniquets in lo *     Implants:  Implants       Type Name Action Serial No.      Joint Knee INSERT, ATTUNE PS RP, SZ 3, 12MM - [id number] Implanted       ATTUNE FEMORAL POROCOAT POSTERIOR STABLILIZED SIZE 3 LEFT CEMENTLESS Implanted       AATTUNE KNEE SYSTEM PATELLA MEDALIZED 32MM DOME AOX AFFIXIUM Implanted       ATTUNE POROCOAT ROTATING PLATFORM SIZE 3 CEMENTLESS Implanted               Findings: severe djd    Indications: Nataliia Pandya is an 59 y.o. female who is having surgery for Left knee pain, unspecified chronicity [M25.562].     The patient was seen in the preoperative area. The risks, benefits, complications, treatment options, non-operative alternatives, expected recovery and outcomes were discussed with the patient. The possibilities of reaction to medication, pulmonary aspiration, injury to surrounding structures, bleeding, recurrent infection, the need for additional procedures, failure to diagnose a condition, and creating a complication requiring transfusion or operation were discussed with the patient. The patient concurred with the proposed plan, giving informed consent.  The site of surgery was properly noted/marked if necessary per policy. The patient has been actively warmed in preoperative area. Preoperative antibiotics have been ordered and given within 1 hours of incision. Venous thrombosis prophylaxis have been ordered including bilateral sequential compression devices and chemical prophylaxis    Procedure Details: Statement of  medical necessity:  This is a 59 y.o.  female with severe degenerative disease of the knee that has failed non operative management. the risks, benefits and alternatives of total knee arthroplasty were discussed with the patient and they signed informed consent.     Justification for 22 modifier:    The patient has an elevated BMI and morbid obesity.  Because of that the patient has a significantly increased soft tissue envelope.  Significant additional work and effort was required to complete the surgery.  This work was required and surgical exposure bony resections as well as component implantation.  This went significantly beyond the usual work time and effort required to complete the surgery therefore 22 modifier is justified    Surgical assistants, as listed above, including residents if listed, were involved in the procedure. They are trained and qualified to assist in the procedure. Prior to the procedure they assisted with patient positioning, setup, and surgical skin preparation. During the procedure, they actively assisted Dr. Miramontes in completing the operation safely and expeditiously by helping to provide exposure, retract tissues, maintain hemostasis, closure, and any other necessary technical tasks.     Description of procedure:   The patient was brought to the operating room and placed on the operating table in the supine position. All bony prominences were well padded. Appropriate preoperative antibiotics were administered. Anesthesia was induced by the anesthesia team and a time out was performed. The patient was identified by name and medical record number and the laterality and the site of surgery were confirmed by all present.     Under pneumatic tourniquet control, a longitudinal anterior skin incision was made with medial parapatellar arthrotomy. Hemostasis was obtained with Bovie electrocautery. Comprehensive exposure the knee was carried out for total knee arthroplasty. The patella was  subluxed laterally and the knee placed in a flexed position.      Using intramedullary alignment, the distal femur was cut in a 5° valgus position. The appropriate-sized femoral component was selected based upon intraoperative measurements of the sagittal dimension of the femur. The distal femoral cutting guide was placed perpendicular to Whitesides line and parallel with the transepicondylar axis, with  3° of external rotation based upon the posterior condylar axis. The distal femur was then finished to accept a posterior stabilized prosthesis.     Attention was then directed to the proximal tibia. Meniscal remnants were excised. Using extramedullary alignment, the proximal tibia was cut perpendicular to its longitudinal axis with a 3° posterior slope. Osteophytes were excised from the posterior femur, medial and lateral femur, and circumferentially about the tibia to avoid soft tissue impingement. The posterior capsule, medial lateral soft tissue structures were injected using combination of Duramorph, Ropivicaine, toradol and epinephrine.Flexion and extension gaps were assessed.     Flexion and extension gaps were equal and rectangular. No ligamentous release was necessary for appropriate balance.     Trial components were placed. Knee achieved full extension and flexion with excellent varus and valgus stability throughout range of motion. The patella tracked centrally. Tibial component rotation was marked, and the tibia finished in the usual fashion to accept an appropriately sized tibial component.     bone surfaces prepared with pulsatile saline irrigation. Bone quality was assessed and deemed appropriate for press fit implants. Final implants were inserted and impacted into position with excellent stability noted. The final articular polyethyelne surface was then inserted.      The patella was assessed and its surfaces judged appropriate for prosthetic resurfacing patellar arthroplasty. Patellar was measured  and cut using an oscillating saw.  It was prepared for prosthetic resurfacing in the standard fashion. Additionally,  excision of synovium from the periphery of the patella and circumferential electrocautery and excision of ostephytes was also performed. with the trial component in place there was excellent patellar tracking and mechanics.  With the final implants in placed the mechanics of the knee were once again confirmed and noted to be excellent.      Joint was irrigated with dilute betadine solution, followed by normal saline.   The arthrotomy was reapproximated using #1 poly and #2 quill sutures. Subcutaneous layer was closed with 2-0 poly, subcuticular layer with 3-0 v-lock, then perineo mesh and glue dressing was placed followed by mepilex, cast padding, and ace wrap.     Patient was awoken from anesthesia and brought to the pacu in stable position.     Statement of staff presence: I was present and scrubbed for all critical portions of the procedure and was immediately available during closing      Complications:  None; patient tolerated the procedure well.    Disposition: PACU - hemodynamically stable.  Condition: stable         Additional Details: none    Attending Attestation: I was present and scrubbed for the key portions of the procedure.    Kevin Miramontes  Phone Number: [phone]

## 2025-05-20 ENCOUNTER — HOSPITAL ENCOUNTER (OUTPATIENT)
Facility: HOSPITAL | Age: 60
Setting detail: OBSERVATION
Discharge: NURSING, REHAB OR LONG-TERM CARE | End: 2025-05-23
Attending: EMERGENCY MEDICINE | Admitting: INTERNAL MEDICINE
Payer: COMMERCIAL

## 2025-05-20 ENCOUNTER — APPOINTMENT (OUTPATIENT)
Dept: RADIOLOGY | Facility: HOSPITAL | Age: 60
End: 2025-05-20
Payer: COMMERCIAL

## 2025-05-20 ENCOUNTER — HOME CARE VISIT (OUTPATIENT)
Dept: HOME HEALTH SERVICES | Facility: HOME HEALTH | Age: 60
End: 2025-05-20

## 2025-05-20 DIAGNOSIS — M25.561 ACUTE PAIN OF RIGHT KNEE: Primary | ICD-10-CM

## 2025-05-20 PROBLEM — R26.2 UNABLE TO AMBULATE: Status: ACTIVE | Noted: 2025-05-20

## 2025-05-20 LAB
ANION GAP SERPL CALC-SCNC: 10 MMOL/L (ref 10–20)
APPEARANCE UR: CLEAR
BASOPHILS # BLD AUTO: 0.04 X10*3/UL (ref 0–0.1)
BASOPHILS NFR BLD AUTO: 0.4 %
BILIRUB UR STRIP.AUTO-MCNC: NEGATIVE MG/DL
BUN SERPL-MCNC: 16 MG/DL (ref 6–23)
CALCIUM SERPL-MCNC: 7.4 MG/DL (ref 8.6–10.3)
CHLORIDE SERPL-SCNC: 103 MMOL/L (ref 98–107)
CO2 SERPL-SCNC: 23 MMOL/L (ref 21–32)
COLOR UR: ABNORMAL
CREAT SERPL-MCNC: 0.56 MG/DL (ref 0.5–1.05)
EGFRCR SERPLBLD CKD-EPI 2021: >90 ML/MIN/1.73M*2
EOSINOPHIL # BLD AUTO: 0.09 X10*3/UL (ref 0–0.7)
EOSINOPHIL NFR BLD AUTO: 0.9 %
ERYTHROCYTE [DISTWIDTH] IN BLOOD BY AUTOMATED COUNT: 13.8 % (ref 11.5–14.5)
GLUCOSE SERPL-MCNC: 114 MG/DL (ref 74–99)
GLUCOSE UR STRIP.AUTO-MCNC: NORMAL MG/DL
HCT VFR BLD AUTO: 36.5 % (ref 36–46)
HGB BLD-MCNC: 12.3 G/DL (ref 12–16)
IMM GRANULOCYTES # BLD AUTO: 0.05 X10*3/UL (ref 0–0.7)
IMM GRANULOCYTES NFR BLD AUTO: 0.5 % (ref 0–0.9)
KETONES UR STRIP.AUTO-MCNC: NEGATIVE MG/DL
LEUKOCYTE ESTERASE UR QL STRIP.AUTO: NEGATIVE
LYMPHOCYTES # BLD AUTO: 1.21 X10*3/UL (ref 1.2–4.8)
LYMPHOCYTES NFR BLD AUTO: 11.8 %
MCH RBC QN AUTO: 32.4 PG (ref 26–34)
MCHC RBC AUTO-ENTMCNC: 33.7 G/DL (ref 32–36)
MCV RBC AUTO: 96 FL (ref 80–100)
MONOCYTES # BLD AUTO: 1.14 X10*3/UL (ref 0.1–1)
MONOCYTES NFR BLD AUTO: 11.1 %
MUCOUS THREADS #/AREA URNS AUTO: NORMAL /LPF
NEUTROPHILS # BLD AUTO: 7.75 X10*3/UL (ref 1.2–7.7)
NEUTROPHILS NFR BLD AUTO: 75.3 %
NITRITE UR QL STRIP.AUTO: NEGATIVE
NRBC BLD-RTO: 0 /100 WBCS (ref 0–0)
PH UR STRIP.AUTO: 6 [PH]
PLATELET # BLD AUTO: 118 X10*3/UL (ref 150–450)
POTASSIUM SERPL-SCNC: 3.6 MMOL/L (ref 3.5–5.3)
PROT UR STRIP.AUTO-MCNC: NEGATIVE MG/DL
RBC # BLD AUTO: 3.8 X10*6/UL (ref 4–5.2)
RBC # UR STRIP.AUTO: ABNORMAL MG/DL
RBC #/AREA URNS AUTO: NORMAL /HPF
SODIUM SERPL-SCNC: 132 MMOL/L (ref 136–145)
SP GR UR STRIP.AUTO: 1.02
SQUAMOUS #/AREA URNS AUTO: NORMAL /HPF
UROBILINOGEN UR STRIP.AUTO-MCNC: NORMAL MG/DL
WBC # BLD AUTO: 10.3 X10*3/UL (ref 4.4–11.3)
WBC #/AREA URNS AUTO: NORMAL /HPF

## 2025-05-20 PROCEDURE — 2500000002 HC RX 250 W HCPCS SELF ADMINISTERED DRUGS (ALT 637 FOR MEDICARE OP, ALT 636 FOR OP/ED): Mod: SE | Performed by: NURSE PRACTITIONER

## 2025-05-20 PROCEDURE — S4991 NICOTINE PATCH NONLEGEND: HCPCS | Mod: SE | Performed by: NURSE PRACTITIONER

## 2025-05-20 PROCEDURE — 2500000005 HC RX 250 GENERAL PHARMACY W/O HCPCS: Mod: SE | Performed by: NURSE PRACTITIONER

## 2025-05-20 PROCEDURE — 2500000001 HC RX 250 WO HCPCS SELF ADMINISTERED DRUGS (ALT 637 FOR MEDICARE OP): Mod: SE | Performed by: NURSE PRACTITIONER

## 2025-05-20 PROCEDURE — 73564 X-RAY EXAM KNEE 4 OR MORE: CPT | Mod: LT

## 2025-05-20 PROCEDURE — 2500000004 HC RX 250 GENERAL PHARMACY W/ HCPCS (ALT 636 FOR OP/ED): Mod: JZ,SE | Performed by: NURSE PRACTITIONER

## 2025-05-20 PROCEDURE — 2500000001 HC RX 250 WO HCPCS SELF ADMINISTERED DRUGS (ALT 637 FOR MEDICARE OP): Mod: SE | Performed by: EMERGENCY MEDICINE

## 2025-05-20 PROCEDURE — 94640 AIRWAY INHALATION TREATMENT: CPT

## 2025-05-20 PROCEDURE — G0378 HOSPITAL OBSERVATION PER HR: HCPCS

## 2025-05-20 PROCEDURE — 94664 DEMO&/EVAL PT USE INHALER: CPT

## 2025-05-20 PROCEDURE — 36415 COLL VENOUS BLD VENIPUNCTURE: CPT | Performed by: EMERGENCY MEDICINE

## 2025-05-20 PROCEDURE — 73564 X-RAY EXAM KNEE 4 OR MORE: CPT | Mod: LEFT SIDE | Performed by: RADIOLOGY

## 2025-05-20 PROCEDURE — 99285 EMERGENCY DEPT VISIT HI MDM: CPT | Performed by: EMERGENCY MEDICINE

## 2025-05-20 PROCEDURE — 85025 COMPLETE CBC W/AUTO DIFF WBC: CPT | Performed by: EMERGENCY MEDICINE

## 2025-05-20 PROCEDURE — 94760 N-INVAS EAR/PLS OXIMETRY 1: CPT

## 2025-05-20 PROCEDURE — 96372 THER/PROPH/DIAG INJ SC/IM: CPT | Performed by: NURSE PRACTITIONER

## 2025-05-20 PROCEDURE — 81001 URINALYSIS AUTO W/SCOPE: CPT | Performed by: EMERGENCY MEDICINE

## 2025-05-20 PROCEDURE — 80048 BASIC METABOLIC PNL TOTAL CA: CPT | Performed by: EMERGENCY MEDICINE

## 2025-05-20 RX ORDER — OXYCODONE AND ACETAMINOPHEN 5; 325 MG/1; MG/1
1 TABLET ORAL EVERY 4 HOURS PRN
Status: DISCONTINUED | OUTPATIENT
Start: 2025-05-20 | End: 2025-05-20

## 2025-05-20 RX ORDER — PSYLLIUM HUSK 0.4 G
1 CAPSULE ORAL DAILY
Status: DISCONTINUED | OUTPATIENT
Start: 2025-05-20 | End: 2025-05-23 | Stop reason: HOSPADM

## 2025-05-20 RX ORDER — FORMOTEROL FUMARATE 20 UG/2ML
20 SOLUTION RESPIRATORY (INHALATION)
Status: DISCONTINUED | OUTPATIENT
Start: 2025-05-20 | End: 2025-05-20

## 2025-05-20 RX ORDER — ONDANSETRON HYDROCHLORIDE 2 MG/ML
4 INJECTION, SOLUTION INTRAVENOUS EVERY 8 HOURS PRN
Status: DISCONTINUED | OUTPATIENT
Start: 2025-05-20 | End: 2025-05-23 | Stop reason: HOSPADM

## 2025-05-20 RX ORDER — NAPROXEN SODIUM 220 MG/1
81 TABLET, FILM COATED ORAL 2 TIMES DAILY
Status: DISCONTINUED | OUTPATIENT
Start: 2025-05-20 | End: 2025-05-23 | Stop reason: HOSPADM

## 2025-05-20 RX ORDER — ATORVASTATIN CALCIUM 10 MG/1
20 TABLET, FILM COATED ORAL DAILY
Status: DISCONTINUED | OUTPATIENT
Start: 2025-05-20 | End: 2025-05-23 | Stop reason: HOSPADM

## 2025-05-20 RX ORDER — DOXYCYCLINE 50 MG/1
100 CAPSULE ORAL DAILY
Status: DISCONTINUED | OUTPATIENT
Start: 2025-05-20 | End: 2025-05-21

## 2025-05-20 RX ORDER — BUDESONIDE 0.25 MG/2ML
0.25 INHALANT ORAL
Status: DISCONTINUED | OUTPATIENT
Start: 2025-05-20 | End: 2025-05-20

## 2025-05-20 RX ORDER — LISINOPRIL 10 MG/1
10 TABLET ORAL DAILY
Status: DISCONTINUED | OUTPATIENT
Start: 2025-05-20 | End: 2025-05-23 | Stop reason: HOSPADM

## 2025-05-20 RX ORDER — ATENOLOL 25 MG/1
25 TABLET ORAL DAILY
Status: DISCONTINUED | OUTPATIENT
Start: 2025-05-20 | End: 2025-05-23 | Stop reason: HOSPADM

## 2025-05-20 RX ORDER — CEFADROXIL 500 MG/1
500 CAPSULE ORAL 2 TIMES DAILY
Status: DISCONTINUED | OUTPATIENT
Start: 2025-05-20 | End: 2025-05-20

## 2025-05-20 RX ORDER — PANTOPRAZOLE SODIUM 40 MG/10ML
40 INJECTION, POWDER, LYOPHILIZED, FOR SOLUTION INTRAVENOUS
Status: DISCONTINUED | OUTPATIENT
Start: 2025-05-21 | End: 2025-05-23 | Stop reason: HOSPADM

## 2025-05-20 RX ORDER — HYDROCHLOROTHIAZIDE 25 MG/1
25 TABLET ORAL DAILY
Status: DISCONTINUED | OUTPATIENT
Start: 2025-05-20 | End: 2025-05-23 | Stop reason: HOSPADM

## 2025-05-20 RX ORDER — ONDANSETRON 4 MG/1
4 TABLET, FILM COATED ORAL EVERY 8 HOURS PRN
Status: DISCONTINUED | OUTPATIENT
Start: 2025-05-20 | End: 2025-05-23 | Stop reason: HOSPADM

## 2025-05-20 RX ORDER — DICYCLOMINE HYDROCHLORIDE 10 MG/1
20 CAPSULE ORAL
Status: DISCONTINUED | OUTPATIENT
Start: 2025-05-20 | End: 2025-05-23 | Stop reason: HOSPADM

## 2025-05-20 RX ORDER — TAMSULOSIN HYDROCHLORIDE 0.4 MG/1
0.4 CAPSULE ORAL DAILY
Status: DISCONTINUED | OUTPATIENT
Start: 2025-05-20 | End: 2025-05-23 | Stop reason: HOSPADM

## 2025-05-20 RX ORDER — OXYCODONE HYDROCHLORIDE 5 MG/1
5 TABLET ORAL EVERY 4 HOURS PRN
Status: DISCONTINUED | OUTPATIENT
Start: 2025-05-20 | End: 2025-05-23 | Stop reason: HOSPADM

## 2025-05-20 RX ORDER — ACETAMINOPHEN 325 MG/1
975 TABLET ORAL EVERY 6 HOURS
Status: COMPLETED | OUTPATIENT
Start: 2025-05-20 | End: 2025-05-22

## 2025-05-20 RX ORDER — FLUTICASONE FUROATE AND VILANTEROL 100; 25 UG/1; UG/1
1 POWDER RESPIRATORY (INHALATION)
Status: DISCONTINUED | OUTPATIENT
Start: 2025-05-21 | End: 2025-05-20

## 2025-05-20 RX ORDER — OXYCODONE HYDROCHLORIDE 5 MG/1
10 TABLET ORAL EVERY 4 HOURS PRN
Status: DISCONTINUED | OUTPATIENT
Start: 2025-05-20 | End: 2025-05-23 | Stop reason: HOSPADM

## 2025-05-20 RX ORDER — CEFADROXIL 500 MG/1
500 CAPSULE ORAL 2 TIMES DAILY
Status: DISCONTINUED | OUTPATIENT
Start: 2025-05-20 | End: 2025-05-21

## 2025-05-20 RX ORDER — FLUTICASONE FUROATE AND VILANTEROL 100; 25 UG/1; UG/1
1 POWDER RESPIRATORY (INHALATION)
Status: DISCONTINUED | OUTPATIENT
Start: 2025-05-20 | End: 2025-05-20

## 2025-05-20 RX ORDER — ENOXAPARIN SODIUM 100 MG/ML
40 INJECTION SUBCUTANEOUS EVERY 24 HOURS
Status: DISCONTINUED | OUTPATIENT
Start: 2025-05-20 | End: 2025-05-23 | Stop reason: HOSPADM

## 2025-05-20 RX ORDER — IBUPROFEN 200 MG
1 TABLET ORAL DAILY
Status: DISCONTINUED | OUTPATIENT
Start: 2025-05-20 | End: 2025-05-23 | Stop reason: HOSPADM

## 2025-05-20 RX ORDER — GABAPENTIN 300 MG/1
300 CAPSULE ORAL 3 TIMES DAILY
Status: DISCONTINUED | OUTPATIENT
Start: 2025-05-20 | End: 2025-05-23 | Stop reason: HOSPADM

## 2025-05-20 RX ORDER — PANTOPRAZOLE SODIUM 40 MG/1
40 TABLET, DELAYED RELEASE ORAL
Status: DISCONTINUED | OUTPATIENT
Start: 2025-05-21 | End: 2025-05-23 | Stop reason: HOSPADM

## 2025-05-20 RX ORDER — FORMOTEROL FUMARATE 20 UG/2ML
20 SOLUTION RESPIRATORY (INHALATION)
Status: DISCONTINUED | OUTPATIENT
Start: 2025-05-20 | End: 2025-05-23 | Stop reason: HOSPADM

## 2025-05-20 RX ORDER — OXYCODONE AND ACETAMINOPHEN 5; 325 MG/1; MG/1
1 TABLET ORAL ONCE
Refills: 0 | Status: COMPLETED | OUTPATIENT
Start: 2025-05-20 | End: 2025-05-20

## 2025-05-20 RX ORDER — BUDESONIDE 0.25 MG/2ML
0.25 INHALANT ORAL
Status: DISCONTINUED | OUTPATIENT
Start: 2025-05-20 | End: 2025-05-23 | Stop reason: HOSPADM

## 2025-05-20 RX ORDER — AMOXICILLIN 250 MG
1 CAPSULE ORAL NIGHTLY PRN
Status: DISCONTINUED | OUTPATIENT
Start: 2025-05-20 | End: 2025-05-22 | Stop reason: SDUPTHER

## 2025-05-20 RX ORDER — ACETAMINOPHEN 500 MG
5 TABLET ORAL NIGHTLY PRN
Status: DISCONTINUED | OUTPATIENT
Start: 2025-05-20 | End: 2025-05-23 | Stop reason: HOSPADM

## 2025-05-20 RX ADMIN — BUDESONIDE 0.25 MG: 0.25 INHALANT RESPIRATORY (INHALATION) at 20:02

## 2025-05-20 RX ADMIN — FORMOTEROL FUMARATE DIHYDRATE 20 MCG: 20 SOLUTION RESPIRATORY (INHALATION) at 20:02

## 2025-05-20 RX ADMIN — ASPIRIN 81 MG CHEWABLE TABLET 81 MG: 81 TABLET CHEWABLE at 20:57

## 2025-05-20 RX ADMIN — NICOTINE 1 PATCH: 14 PATCH, EXTENDED RELEASE TRANSDERMAL at 18:42

## 2025-05-20 RX ADMIN — Medication 2 L/MIN: at 20:02

## 2025-05-20 RX ADMIN — ENOXAPARIN SODIUM 40 MG: 100 INJECTION SUBCUTANEOUS at 18:42

## 2025-05-20 RX ADMIN — ATORVASTATIN CALCIUM 20 MG: 10 TABLET, FILM COATED ORAL at 20:57

## 2025-05-20 RX ADMIN — OXYCODONE HYDROCHLORIDE AND ACETAMINOPHEN 1 TABLET: 5; 325 TABLET ORAL at 15:50

## 2025-05-20 RX ADMIN — GABAPENTIN 300 MG: 300 CAPSULE ORAL at 20:57

## 2025-05-20 RX ADMIN — ACETAMINOPHEN 325MG 975 MG: 325 TABLET ORAL at 18:42

## 2025-05-20 SDOH — SOCIAL STABILITY: SOCIAL INSECURITY
WITHIN THE LAST YEAR, HAVE YOU BEEN KICKED, HIT, SLAPPED, OR OTHERWISE PHYSICALLY HURT BY YOUR PARTNER OR EX-PARTNER?: NO

## 2025-05-20 SDOH — HEALTH STABILITY: PHYSICAL HEALTH: ON AVERAGE, HOW MANY DAYS PER WEEK DO YOU ENGAGE IN MODERATE TO STRENUOUS EXERCISE (LIKE A BRISK WALK)?: 0 DAYS

## 2025-05-20 SDOH — ECONOMIC STABILITY: FOOD INSECURITY: WITHIN THE PAST 12 MONTHS, THE FOOD YOU BOUGHT JUST DIDN'T LAST AND YOU DIDN'T HAVE MONEY TO GET MORE.: NEVER TRUE

## 2025-05-20 SDOH — SOCIAL STABILITY: SOCIAL NETWORK
DO YOU BELONG TO ANY CLUBS OR ORGANIZATIONS SUCH AS CHURCH GROUPS, UNIONS, FRATERNAL OR ATHLETIC GROUPS, OR SCHOOL GROUPS?: NO

## 2025-05-20 SDOH — SOCIAL STABILITY: SOCIAL INSECURITY: WITHIN THE LAST YEAR, HAVE YOU BEEN HUMILIATED OR EMOTIONALLY ABUSED IN OTHER WAYS BY YOUR PARTNER OR EX-PARTNER?: NO

## 2025-05-20 SDOH — SOCIAL STABILITY: SOCIAL NETWORK: HOW OFTEN DO YOU GET TOGETHER WITH FRIENDS OR RELATIVES?: ONCE A WEEK

## 2025-05-20 SDOH — ECONOMIC STABILITY: FOOD INSECURITY: WITHIN THE PAST 12 MONTHS, YOU WORRIED THAT YOUR FOOD WOULD RUN OUT BEFORE YOU GOT THE MONEY TO BUY MORE.: NEVER TRUE

## 2025-05-20 SDOH — SOCIAL STABILITY: SOCIAL NETWORK
IN A TYPICAL WEEK, HOW MANY TIMES DO YOU TALK ON THE PHONE WITH FAMILY, FRIENDS, OR NEIGHBORS?: MORE THAN THREE TIMES A WEEK

## 2025-05-20 SDOH — SOCIAL STABILITY: SOCIAL INSECURITY: WITHIN THE LAST YEAR, HAVE YOU BEEN AFRAID OF YOUR PARTNER OR EX-PARTNER?: NO

## 2025-05-20 SDOH — SOCIAL STABILITY: SOCIAL INSECURITY
WITHIN THE LAST YEAR, HAVE YOU BEEN RAPED OR FORCED TO HAVE ANY KIND OF SEXUAL ACTIVITY BY YOUR PARTNER OR EX-PARTNER?: NO

## 2025-05-20 SDOH — SOCIAL STABILITY: SOCIAL INSECURITY: HAVE YOU HAD ANY THOUGHTS OF HARMING ANYONE ELSE?: NO

## 2025-05-20 SDOH — ECONOMIC STABILITY: FOOD INSECURITY: HOW HARD IS IT FOR YOU TO PAY FOR THE VERY BASICS LIKE FOOD, HOUSING, MEDICAL CARE, AND HEATING?: NOT HARD AT ALL

## 2025-05-20 SDOH — SOCIAL STABILITY: SOCIAL INSECURITY: ARE YOU MARRIED, WIDOWED, DIVORCED, SEPARATED, NEVER MARRIED, OR LIVING WITH A PARTNER?: MARRIED

## 2025-05-20 SDOH — SOCIAL STABILITY: SOCIAL INSECURITY: HAVE YOU HAD THOUGHTS OF HARMING ANYONE ELSE?: NO

## 2025-05-20 SDOH — SOCIAL STABILITY: SOCIAL INSECURITY: ARE THERE ANY APPARENT SIGNS OF INJURIES/BEHAVIORS THAT COULD BE RELATED TO ABUSE/NEGLECT?: NO

## 2025-05-20 SDOH — HEALTH STABILITY: PHYSICAL HEALTH: ON AVERAGE, HOW MANY MINUTES DO YOU ENGAGE IN EXERCISE AT THIS LEVEL?: 0 MIN

## 2025-05-20 SDOH — HEALTH STABILITY: MENTAL HEALTH
DO YOU FEEL STRESS - TENSE, RESTLESS, NERVOUS, OR ANXIOUS, OR UNABLE TO SLEEP AT NIGHT BECAUSE YOUR MIND IS TROUBLED ALL THE TIME - THESE DAYS?: NOT AT ALL

## 2025-05-20 SDOH — SOCIAL STABILITY: SOCIAL NETWORK: HOW OFTEN DO YOU ATTEND CHURCH OR RELIGIOUS SERVICES?: MORE THAN 4 TIMES PER YEAR

## 2025-05-20 SDOH — SOCIAL STABILITY: SOCIAL NETWORK: HOW OFTEN DO YOU ATTEND MEETINGS OF THE CLUBS OR ORGANIZATIONS YOU BELONG TO?: NEVER

## 2025-05-20 SDOH — SOCIAL STABILITY: SOCIAL INSECURITY: DO YOU FEEL UNSAFE GOING BACK TO THE PLACE WHERE YOU ARE LIVING?: NO

## 2025-05-20 SDOH — ECONOMIC STABILITY: INCOME INSECURITY: IN THE PAST 12 MONTHS HAS THE ELECTRIC, GAS, OIL, OR WATER COMPANY THREATENED TO SHUT OFF SERVICES IN YOUR HOME?: NO

## 2025-05-20 SDOH — SOCIAL STABILITY: SOCIAL INSECURITY: DO YOU FEEL ANYONE HAS EXPLOITED OR TAKEN ADVANTAGE OF YOU FINANCIALLY OR OF YOUR PERSONAL PROPERTY?: NO

## 2025-05-20 SDOH — SOCIAL STABILITY: SOCIAL INSECURITY: DOES ANYONE TRY TO KEEP YOU FROM HAVING/CONTACTING OTHER FRIENDS OR DOING THINGS OUTSIDE YOUR HOME?: NO

## 2025-05-20 SDOH — SOCIAL STABILITY: SOCIAL INSECURITY: ABUSE: ADULT

## 2025-05-20 SDOH — SOCIAL STABILITY: SOCIAL INSECURITY: HAS ANYONE EVER THREATENED TO HURT YOUR FAMILY OR YOUR PETS?: NO

## 2025-05-20 SDOH — SOCIAL STABILITY: SOCIAL INSECURITY: ARE YOU OR HAVE YOU BEEN THREATENED OR ABUSED PHYSICALLY, EMOTIONALLY, OR SEXUALLY BY ANYONE?: NO

## 2025-05-20 ASSESSMENT — COGNITIVE AND FUNCTIONAL STATUS - GENERAL
TURNING FROM BACK TO SIDE WHILE IN FLAT BAD: A LITTLE
DRESSING REGULAR UPPER BODY CLOTHING: A LITTLE
CLIMB 3 TO 5 STEPS WITH RAILING: TOTAL
MOVING FROM LYING ON BACK TO SITTING ON SIDE OF FLAT BED WITH BEDRAILS: A LITTLE
HELP NEEDED FOR BATHING: A LOT
DRESSING REGULAR LOWER BODY CLOTHING: A LITTLE
DAILY ACTIVITIY SCORE: 16
WALKING IN HOSPITAL ROOM: A LOT
TOILETING: A LOT
PERSONAL GROOMING: A LOT
MOBILITY SCORE: 14
PATIENT BASELINE BEDBOUND: NO
STANDING UP FROM CHAIR USING ARMS: A LITTLE
MOVING TO AND FROM BED TO CHAIR: A LOT

## 2025-05-20 ASSESSMENT — ACTIVITIES OF DAILY LIVING (ADL)
ADEQUATE_TO_COMPLETE_ADL: YES
TOILETING: NEEDS ASSISTANCE
DRESSING YOURSELF: NEEDS ASSISTANCE
LACK_OF_TRANSPORTATION: NO
HEARING - LEFT EAR: FUNCTIONAL
GROOMING: NEEDS ASSISTANCE
JUDGMENT_ADEQUATE_SAFELY_COMPLETE_DAILY_ACTIVITIES: YES
WALKS IN HOME: NEEDS ASSISTANCE
PATIENT'S MEMORY ADEQUATE TO SAFELY COMPLETE DAILY ACTIVITIES?: YES
FEEDING YOURSELF: NEEDS ASSISTANCE
BATHING: NEEDS ASSISTANCE
HEARING - RIGHT EAR: FUNCTIONAL

## 2025-05-20 ASSESSMENT — PAIN - FUNCTIONAL ASSESSMENT: PAIN_FUNCTIONAL_ASSESSMENT: 0-10

## 2025-05-20 ASSESSMENT — COLUMBIA-SUICIDE SEVERITY RATING SCALE - C-SSRS
1. IN THE PAST MONTH, HAVE YOU WISHED YOU WERE DEAD OR WISHED YOU COULD GO TO SLEEP AND NOT WAKE UP?: NO
2. HAVE YOU ACTUALLY HAD ANY THOUGHTS OF KILLING YOURSELF?: NO
6. HAVE YOU EVER DONE ANYTHING, STARTED TO DO ANYTHING, OR PREPARED TO DO ANYTHING TO END YOUR LIFE?: NO

## 2025-05-20 ASSESSMENT — LIFESTYLE VARIABLES
SKIP TO QUESTIONS 9-10: 1
AUDIT-C TOTAL SCORE: 0
HOW OFTEN DO YOU HAVE A DRINK CONTAINING ALCOHOL: NEVER
HOW MANY STANDARD DRINKS CONTAINING ALCOHOL DO YOU HAVE ON A TYPICAL DAY: PATIENT DOES NOT DRINK
HOW OFTEN DO YOU HAVE 6 OR MORE DRINKS ON ONE OCCASION: NEVER
AUDIT-C TOTAL SCORE: 0

## 2025-05-20 ASSESSMENT — PAIN SCALES - GENERAL
PAINLEVEL_OUTOF10: 10 - WORST POSSIBLE PAIN
PAINLEVEL_OUTOF10: 10 - WORST POSSIBLE PAIN
PAINLEVEL_OUTOF10: 4
PAINLEVEL_OUTOF10: 0 - NO PAIN

## 2025-05-20 ASSESSMENT — PAIN DESCRIPTION - ORIENTATION: ORIENTATION: LEFT

## 2025-05-20 ASSESSMENT — PATIENT HEALTH QUESTIONNAIRE - PHQ9
SUM OF ALL RESPONSES TO PHQ9 QUESTIONS 1 & 2: 0
2. FEELING DOWN, DEPRESSED OR HOPELESS: NOT AT ALL
1. LITTLE INTEREST OR PLEASURE IN DOING THINGS: NOT AT ALL

## 2025-05-20 ASSESSMENT — PAIN DESCRIPTION - DESCRIPTORS: DESCRIPTORS: TIGHTNESS

## 2025-05-20 ASSESSMENT — PAIN DESCRIPTION - PAIN TYPE: TYPE: SURGICAL PAIN

## 2025-05-20 ASSESSMENT — PAIN DESCRIPTION - LOCATION: LOCATION: KNEE

## 2025-05-20 NOTE — ED PROVIDER NOTES
HPI   Chief Complaint   Patient presents with    Knee Pain     Patient reports she had left knee surgery yesterday and was having 10/10 pain and swelling, pt reports she took an oxycodone at 12pm and it didn't help so she called EMS, they gave fentanyl 50 mcg and Zofran 4 mg        HPI        Patient History   Medical History[1]  Surgical History[2]  Family History[3]  Social History[4]    Physical Exam   ED Triage Vitals   Temperature Heart Rate Respirations BP   05/20/25 1320 05/20/25 1320 05/20/25 1320 05/20/25 1320   37 °C (98.6 °F) 78 14 110/64      Pulse Ox Temp Source Heart Rate Source Patient Position   05/20/25 1320 05/20/25 1320 05/20/25 1550 05/20/25 1550   (!) 93 % Temporal Monitor Sitting      BP Location FiO2 (%)     05/20/25 1550 --     Right arm        Physical Exam  Constitutional:       General: She is not in acute distress.     Appearance: Normal appearance. She is not toxic-appearing.   HENT:      Head: Normocephalic and atraumatic.      Right Ear: Tympanic membrane normal.      Left Ear: Tympanic membrane normal.      Mouth/Throat:      Mouth: Mucous membranes are moist.      Pharynx: Oropharynx is clear.   Eyes:      Conjunctiva/sclera: Conjunctivae normal.      Pupils: Pupils are equal, round, and reactive to light.   Cardiovascular:      Rate and Rhythm: Normal rate and regular rhythm.      Pulses: Normal pulses.      Heart sounds: Normal heart sounds.   Pulmonary:      Effort: Pulmonary effort is normal. No respiratory distress.      Breath sounds: Normal breath sounds. No wheezing.   Abdominal:      General: Bowel sounds are normal.      Palpations: Abdomen is soft.      Tenderness: There is no abdominal tenderness. There is no guarding or rebound.   Musculoskeletal:         General: Swelling present. Normal range of motion.      Cervical back: Normal range of motion.        Legs:    Skin:     General: Skin is warm and dry.   Neurological:      General: No focal deficit present.      Mental  Status: She is alert and oriented to person, place, and time.           ED Course & MDM   ED Course as of 05/20/25 1758   Tue May 20, 2025   1735 FYI, she called 911 came to the ED at [facility] for left knee pain you did her surgery yesterday, I am doing a plain x-ray.  13:17  BS  Kevin Miramontes MD  ok thanks, just got out of surgery. xrays look ok, we had heart from PT she was having a hard time at home  16:33  What's your rec? place for rehab?   16:33  Kevin Miramontes MD  if she can't take care of herself at home then yes place in rehab  BS  I thought she'd come here, if they're ok admitting her there to take care of that its no problem, if you'd rather transfer I'm ok with that also  58 mins  Let me talk to inpatient team and see what they say  And no, brought to [facility] instead of [facility]  56 mins  BS  Kevin Miramontes MD  no problem. if they don't feel they can do it she can go to hospitalist at [facility] and we'll consult in am  56 mins  Patricia Henderson, APRN-CNP was added by you.  56 mins  [place]  Patricia Henderson APRN-CNP  I'll accept here for placement  48 mins  BS  Kevin Miramontes MD  thanks, let me know if you need anything or any questions come up  1  48 mins  Thank you for your help everybody   [KA]      ED Course User Index  [KA] Sebastian Vallejo, DO         Diagnoses as of 05/20/25 1758   Acute pain of right knee                 No data recorded                                 Medical Decision Making  POD 1 from Procedures KNEE REPLACEMENT TOTAL HYBRID UNILAT, 70878 - [place] ARTHRP KNE CONDYLE&PLATU MEDIAL&LAT COMPARTMENTS.    Patient reports her pain is not tolerable at home so she had to call 911.  Patient did have surgery at [facility].  Unfortunately EMS brought her to [facility].  I was able to reach out to the surgeon please see the other notes he is okay with the patient be admitted here for pain control and pacer placement and rehab.  At this time due to a fresh knee I did  remove the Ace wrap per the surgeon he recommended keep the actual incision dressing on        Procedure  Procedures       Sebastian Vallejo DO  25 1742         [1]   Past Medical History:  Diagnosis Date    Adverse effect of anesthesia     pt states she cannot have spinal due to scoliosis    Arthritis     Carotid artery stenosis     right    Chronic pain     COPD (chronic obstructive pulmonary disease) (Multi)     Diverticulitis     Dysfunctional uterine bleeding     Encounter for pre-operative cardiovascular clearance 2025    WENDY Rebollar CNP / K Marino MCDONALD    Hyperlipidemia     Hypertension     Lung nodules     Lupus (systemic lupus erythematosus) (Multi)     Morbid obesity (Multi)     PONV (postoperative nausea and vomiting)     Scoliosis     Sleep apnea     with CPAP    Uses roller walker     Wears glasses    [2]   Past Surgical History:  Procedure Laterality Date    CARPAL TUNNEL RELEASE Right     CATARACT EXTRACTION       SECTION, LOW TRANSVERSE      CHOLECYSTECTOMY      COLONOSCOPY      CYSTOSCOPY      DILATION AND CURETTAGE OF UTERUS      FOOT Right     HYSTERECTOMY      with right oophorectomy and bilateral salpingectomy    UPPER GASTROINTESTINAL ENDOSCOPY     [3] No family history on file.  [4]   Social History  Tobacco Use    Smoking status: Every Day     Current packs/day: 1.00     Types: Cigarettes    Smokeless tobacco: Never   Substance Use Topics    Alcohol use: Never    Drug use: Never        Sebastian Vallejo DO  25 1758

## 2025-05-20 NOTE — CASE COMMUNICATION
Upon arrival for admission and PT evaluation , pt stated she was in 10/10 pain became tearful and stated she is unable to safley get to the bathroom and unable to get assistance safely from her huisband. pt  , while I was there, was SOB and almost fell twice trying to bring stuff to her. pt icing and taking pain meds as directed with no relief. pt home very cluttered with very narrow pathways leading to safety concerns with funct ional mobility and walker clearance. pt sent to ER per Dr Miramontes request for inpatient admission for rehab.

## 2025-05-21 PROBLEM — E78.5 HLD (HYPERLIPIDEMIA): Status: ACTIVE | Noted: 2025-05-21

## 2025-05-21 PROBLEM — Z96.652 S/P TKR (TOTAL KNEE REPLACEMENT), LEFT: Status: ACTIVE | Noted: 2025-05-21

## 2025-05-21 PROBLEM — Z72.0 NICOTINE ABUSE: Status: ACTIVE | Noted: 2025-05-21

## 2025-05-21 PROBLEM — I25.10 CAD (CORONARY ARTERY DISEASE): Status: ACTIVE | Noted: 2025-05-21

## 2025-05-21 PROBLEM — I10 HTN (HYPERTENSION): Status: ACTIVE | Noted: 2025-05-21

## 2025-05-21 PROBLEM — K21.9 GERD (GASTROESOPHAGEAL REFLUX DISEASE): Status: ACTIVE | Noted: 2025-05-21

## 2025-05-21 PROBLEM — G62.9 NEUROPATHY: Status: ACTIVE | Noted: 2025-05-21

## 2025-05-21 PROBLEM — J44.9 COPD (CHRONIC OBSTRUCTIVE PULMONARY DISEASE) (MULTI): Status: ACTIVE | Noted: 2025-05-21

## 2025-05-21 LAB
ANION GAP SERPL CALC-SCNC: 12 MMOL/L (ref 10–20)
BUN SERPL-MCNC: 11 MG/DL (ref 6–23)
CALCIUM SERPL-MCNC: 8.9 MG/DL (ref 8.6–10.3)
CHLORIDE SERPL-SCNC: 98 MMOL/L (ref 98–107)
CO2 SERPL-SCNC: 27 MMOL/L (ref 21–32)
CREAT SERPL-MCNC: 0.52 MG/DL (ref 0.5–1.05)
EGFRCR SERPLBLD CKD-EPI 2021: >90 ML/MIN/1.73M*2
ERYTHROCYTE [DISTWIDTH] IN BLOOD BY AUTOMATED COUNT: 13.6 % (ref 11.5–14.5)
GLUCOSE SERPL-MCNC: 119 MG/DL (ref 74–99)
HCT VFR BLD AUTO: 36.5 % (ref 36–46)
HGB BLD-MCNC: 11.9 G/DL (ref 12–16)
HOLD SPECIMEN: NORMAL
MCH RBC QN AUTO: 31.6 PG (ref 26–34)
MCHC RBC AUTO-ENTMCNC: 32.6 G/DL (ref 32–36)
MCV RBC AUTO: 97 FL (ref 80–100)
NRBC BLD-RTO: 0 /100 WBCS (ref 0–0)
PLATELET # BLD AUTO: 121 X10*3/UL (ref 150–450)
POTASSIUM SERPL-SCNC: 3.8 MMOL/L (ref 3.5–5.3)
RBC # BLD AUTO: 3.77 X10*6/UL (ref 4–5.2)
SODIUM SERPL-SCNC: 133 MMOL/L (ref 136–145)
WBC # BLD AUTO: 8.8 X10*3/UL (ref 4.4–11.3)

## 2025-05-21 PROCEDURE — S4991 NICOTINE PATCH NONLEGEND: HCPCS | Mod: SE | Performed by: NURSE PRACTITIONER

## 2025-05-21 PROCEDURE — 2500000001 HC RX 250 WO HCPCS SELF ADMINISTERED DRUGS (ALT 637 FOR MEDICARE OP): Mod: SE | Performed by: NURSE PRACTITIONER

## 2025-05-21 PROCEDURE — 82374 ASSAY BLOOD CARBON DIOXIDE: CPT | Performed by: NURSE PRACTITIONER

## 2025-05-21 PROCEDURE — 94640 AIRWAY INHALATION TREATMENT: CPT

## 2025-05-21 PROCEDURE — 97110 THERAPEUTIC EXERCISES: CPT | Mod: GP | Performed by: PHYSICAL THERAPIST

## 2025-05-21 PROCEDURE — 99223 1ST HOSP IP/OBS HIGH 75: CPT | Performed by: NURSE PRACTITIONER

## 2025-05-21 PROCEDURE — 36415 COLL VENOUS BLD VENIPUNCTURE: CPT | Performed by: NURSE PRACTITIONER

## 2025-05-21 PROCEDURE — 2500000005 HC RX 250 GENERAL PHARMACY W/O HCPCS: Mod: SE | Performed by: NURSE PRACTITIONER

## 2025-05-21 PROCEDURE — 97166 OT EVAL MOD COMPLEX 45 MIN: CPT | Mod: GO

## 2025-05-21 PROCEDURE — 2500000002 HC RX 250 W HCPCS SELF ADMINISTERED DRUGS (ALT 637 FOR MEDICARE OP, ALT 636 FOR OP/ED): Mod: SE | Performed by: NURSE PRACTITIONER

## 2025-05-21 PROCEDURE — 94760 N-INVAS EAR/PLS OXIMETRY 1: CPT

## 2025-05-21 PROCEDURE — G0378 HOSPITAL OBSERVATION PER HR: HCPCS

## 2025-05-21 PROCEDURE — 96372 THER/PROPH/DIAG INJ SC/IM: CPT | Performed by: NURSE PRACTITIONER

## 2025-05-21 PROCEDURE — 85027 COMPLETE CBC AUTOMATED: CPT | Performed by: NURSE PRACTITIONER

## 2025-05-21 PROCEDURE — 2500000004 HC RX 250 GENERAL PHARMACY W/ HCPCS (ALT 636 FOR OP/ED): Mod: JZ,SE | Performed by: NURSE PRACTITIONER

## 2025-05-21 PROCEDURE — 9420000001 HC RT PATIENT EDUCATION 5 MIN

## 2025-05-21 PROCEDURE — 97161 PT EVAL LOW COMPLEX 20 MIN: CPT | Mod: GP | Performed by: PHYSICAL THERAPIST

## 2025-05-21 RX ORDER — DOXYCYCLINE HYCLATE 100 MG
100 TABLET ORAL DAILY
Status: DISCONTINUED | OUTPATIENT
Start: 2025-05-21 | End: 2025-05-23 | Stop reason: HOSPADM

## 2025-05-21 RX ORDER — CEPHALEXIN 250 MG/1
500 CAPSULE ORAL EVERY 8 HOURS SCHEDULED
Status: DISCONTINUED | OUTPATIENT
Start: 2025-05-21 | End: 2025-05-23 | Stop reason: HOSPADM

## 2025-05-21 RX ORDER — POLYETHYLENE GLYCOL 3350 17 G/17G
17 POWDER, FOR SOLUTION ORAL 2 TIMES DAILY PRN
Status: DISCONTINUED | OUTPATIENT
Start: 2025-05-21 | End: 2025-05-23 | Stop reason: HOSPADM

## 2025-05-21 RX ADMIN — GABAPENTIN 300 MG: 300 CAPSULE ORAL at 20:38

## 2025-05-21 RX ADMIN — DICYCLOMINE HYDROCHLORIDE 20 MG: 10 CAPSULE ORAL at 11:08

## 2025-05-21 RX ADMIN — Medication 1 TABLET: at 08:23

## 2025-05-21 RX ADMIN — TAMSULOSIN HYDROCHLORIDE 0.4 MG: 0.4 CAPSULE ORAL at 08:23

## 2025-05-21 RX ADMIN — CEPHALEXIN 500 MG: 250 CAPSULE ORAL at 09:49

## 2025-05-21 RX ADMIN — CEPHALEXIN 500 MG: 250 CAPSULE ORAL at 21:29

## 2025-05-21 RX ADMIN — DICYCLOMINE HYDROCHLORIDE 20 MG: 10 CAPSULE ORAL at 08:22

## 2025-05-21 RX ADMIN — ACETAMINOPHEN 325MG 975 MG: 325 TABLET ORAL at 11:15

## 2025-05-21 RX ADMIN — Medication 2 L/MIN: at 10:29

## 2025-05-21 RX ADMIN — DICYCLOMINE HYDROCHLORIDE 20 MG: 10 CAPSULE ORAL at 17:05

## 2025-05-21 RX ADMIN — ATENOLOL 25 MG: 25 TABLET ORAL at 08:23

## 2025-05-21 RX ADMIN — ASPIRIN 81 MG CHEWABLE TABLET 81 MG: 81 TABLET CHEWABLE at 08:23

## 2025-05-21 RX ADMIN — OXYCODONE 10 MG: 5 TABLET ORAL at 05:52

## 2025-05-21 RX ADMIN — SENNOSIDES AND DOCUSATE SODIUM 1 TABLET: 50; 8.6 TABLET ORAL at 05:52

## 2025-05-21 RX ADMIN — GABAPENTIN 300 MG: 300 CAPSULE ORAL at 08:23

## 2025-05-21 RX ADMIN — LISINOPRIL 10 MG: 10 TABLET ORAL at 08:23

## 2025-05-21 RX ADMIN — Medication 4 L/MIN: at 00:42

## 2025-05-21 RX ADMIN — Medication 4 L/MIN: at 22:40

## 2025-05-21 RX ADMIN — PANTOPRAZOLE SODIUM 40 MG: 40 TABLET, DELAYED RELEASE ORAL at 06:06

## 2025-05-21 RX ADMIN — DOXYCYCLINE HYCLATE 100 MG: 100 TABLET, COATED ORAL at 11:09

## 2025-05-21 RX ADMIN — Medication 21 PERCENT: at 20:06

## 2025-05-21 RX ADMIN — BUDESONIDE 0.25 MG: 0.25 INHALANT RESPIRATORY (INHALATION) at 09:58

## 2025-05-21 RX ADMIN — NICOTINE 1 PATCH: 14 PATCH, EXTENDED RELEASE TRANSDERMAL at 08:23

## 2025-05-21 RX ADMIN — FORMOTEROL FUMARATE DIHYDRATE 20 MCG: 20 SOLUTION RESPIRATORY (INHALATION) at 20:06

## 2025-05-21 RX ADMIN — GABAPENTIN 300 MG: 300 CAPSULE ORAL at 14:06

## 2025-05-21 RX ADMIN — FORMOTEROL FUMARATE DIHYDRATE 20 MCG: 20 SOLUTION RESPIRATORY (INHALATION) at 09:58

## 2025-05-21 RX ADMIN — ACETAMINOPHEN 325MG 975 MG: 325 TABLET ORAL at 18:04

## 2025-05-21 RX ADMIN — OXYCODONE 10 MG: 5 TABLET ORAL at 09:49

## 2025-05-21 RX ADMIN — ACETAMINOPHEN 325MG 975 MG: 325 TABLET ORAL at 00:00

## 2025-05-21 RX ADMIN — HYDROCHLOROTHIAZIDE 25 MG: 25 TABLET ORAL at 08:23

## 2025-05-21 RX ADMIN — ASPIRIN 81 MG CHEWABLE TABLET 81 MG: 81 TABLET CHEWABLE at 20:38

## 2025-05-21 RX ADMIN — OXYCODONE 5 MG: 5 TABLET ORAL at 20:38

## 2025-05-21 RX ADMIN — BUDESONIDE 0.25 MG: 0.25 INHALANT RESPIRATORY (INHALATION) at 20:06

## 2025-05-21 RX ADMIN — ATORVASTATIN CALCIUM 20 MG: 10 TABLET, FILM COATED ORAL at 20:38

## 2025-05-21 RX ADMIN — ACETAMINOPHEN 325MG 975 MG: 325 TABLET ORAL at 05:52

## 2025-05-21 RX ADMIN — ENOXAPARIN SODIUM 40 MG: 100 INJECTION SUBCUTANEOUS at 18:04

## 2025-05-21 ASSESSMENT — ENCOUNTER SYMPTOMS
JOINT SWELLING: 1
HEMATOLOGIC/LYMPHATIC NEGATIVE: 1
GASTROINTESTINAL NEGATIVE: 1
CARDIOVASCULAR NEGATIVE: 1
PSYCHIATRIC NEGATIVE: 1
ENDOCRINE NEGATIVE: 1
ACTIVITY CHANGE: 1
RESPIRATORY NEGATIVE: 1
ARTHRALGIAS: 1
ALLERGIC/IMMUNOLOGIC NEGATIVE: 1
EYES NEGATIVE: 1

## 2025-05-21 ASSESSMENT — COGNITIVE AND FUNCTIONAL STATUS - GENERAL
DAILY ACTIVITIY SCORE: 12
STANDING UP FROM CHAIR USING ARMS: A LOT
HELP NEEDED FOR BATHING: A LOT
TOILETING: A LOT
WALKING IN HOSPITAL ROOM: A LOT
HELP NEEDED FOR BATHING: A LITTLE
DRESSING REGULAR UPPER BODY CLOTHING: A LOT
STANDING UP FROM CHAIR USING ARMS: A LITTLE
DRESSING REGULAR LOWER BODY CLOTHING: A LOT
MOVING TO AND FROM BED TO CHAIR: A LITTLE
WALKING IN HOSPITAL ROOM: A LITTLE
DAILY ACTIVITIY SCORE: 21
TURNING FROM BACK TO SIDE WHILE IN FLAT BAD: A LITTLE
TOILETING: A LITTLE
DRESSING REGULAR LOWER BODY CLOTHING: A LITTLE
CLIMB 3 TO 5 STEPS WITH RAILING: A LOT
MOBILITY SCORE: 16
CLIMB 3 TO 5 STEPS WITH RAILING: A LOT
MOVING FROM LYING ON BACK TO SITTING ON SIDE OF FLAT BED WITH BEDRAILS: A LITTLE
PERSONAL GROOMING: A LOT
MOBILITY SCORE: 17
EATING MEALS: A LOT
MOVING TO AND FROM BED TO CHAIR: A LOT

## 2025-05-21 ASSESSMENT — PAIN SCALES - GENERAL
PAINLEVEL_OUTOF10: 0 - NO PAIN
PAINLEVEL_OUTOF10: 3
PAINLEVEL_OUTOF10: 0 - NO PAIN
PAINLEVEL_OUTOF10: 10 - WORST POSSIBLE PAIN
PAINLEVEL_OUTOF10: 7
PAINLEVEL_OUTOF10: 3
PAINLEVEL_OUTOF10: 0 - NO PAIN

## 2025-05-21 ASSESSMENT — PAIN - FUNCTIONAL ASSESSMENT
PAIN_FUNCTIONAL_ASSESSMENT: 0-10

## 2025-05-21 ASSESSMENT — ACTIVITIES OF DAILY LIVING (ADL)
ADL_ASSISTANCE: INDEPENDENT
LACK_OF_TRANSPORTATION: NO
BATHING_ASSISTANCE: MINIMAL

## 2025-05-21 ASSESSMENT — PAIN DESCRIPTION - DESCRIPTORS: DESCRIPTORS: ACHING;SORE

## 2025-05-21 NOTE — PROGRESS NOTES
Physical Therapy    Physical Therapy Evaluation and Treatment     Patient Name: Nataliia Pandya  MRN: [id number]  Department: [facility]  Room: [address] [place], [place] [address]  Today's Date: 5/21/2025   Time Calculation  Start Time: 0900  Stop Time: 0925  Time Calculation (min): 25 min    Assessment/Plan   PT Assessment  PT Assessment Results: Decreased strength, Impaired balance, Decreased mobility, Decreased range of motion, Decreased skin integrity, Pain, Orthopedic restrictions  Rehab Prognosis: Good  Barriers to Discharge Home: Caregiver assistance, Physical needs  Caregiver Assistance: Caregiver assistance needed per identified barriers - however, level of patient's required assistance exceeds assistance available at home  Physical Needs: Ambulating household distances limited by function/safety, 24hr mobility assistance needed  Evaluation/Treatment Tolerance: Patient tolerated treatment well (breakfast tray arrival)  Medical Staff Made Aware: Yes  Strengths: Ability to acquire knowledge  Barriers to Participation:  (n/a)  End of Session Communication: Bedside nurse  Assessment Comment: Pleasant  59 y.o presents with pain, decreased ROM, weakness and impaired mobility s/p L TKA. Pt. was recently d/c to home and is unable to manage at this time. Pt. lives with  (who states he has his own health issues and is not able to help her much). Pt. currently requires Holly with bed mobility and would benefit from additional PT to address above noted limitations and prevent further decline.  End of Session Patient Position: Alarm on, Bed, 2 rail up  IP OR SWING BED PT PLAN  Inpatient or Swing Bed: Inpatient  PT Plan  Treatment/Interventions: Transfer training, Bed mobility, Gait training, Stair training, Balance training, Strengthening, Endurance training, Therapeutic exercise, Therapeutic activity, Home exercise program, Neuromuscular re-education, Positioning  PT Plan: Ongoing PT  PT Frequency: 5 times per week  PT Discharge Recommendations:  Moderate intensity level of continued care  PT Recommended Transfer Status: Assist x1  PT - OK to Discharge: Yes Based on completed evaluation and care plan recommendations, no barriers to discharge to next site of care       Subjective     PT Visit Info:  PT Received On: 05/21/25  General Visit Information:  General  Reason for Referral: impaired mobility, unable to amb.  Referred By: VALERIE Nichols  Past Medical History Relevant to Rehab: L TKA 5/19 (COPD, HPL, HTN, PONV, sleep apnea)  Prior to Session Communication: Bedside nurse  Patient Position Received: Bed, 2 rail up, Alarm on  General Comment: masimo, ice pack applied, gauze and ace wrap not secure on LE (RN requested provider look at)  Home Living:  Home Living  Type of Home: House  Lives With: Spouse (states he has his own health issues)  Home Adaptive Equipment: Walker rolling or standard  Home Layout: One level  Home Access: Ramped entrance  Bathroom Shower/Tub: Walk-in shower  Prior Level of Function:  Prior Function Per Pt/Caregiver Report  Level of [place]: Independent with ADLs and functional transfers, Independent with homemaking with ambulation  Ambulatory Assistance: Independent (with cane typically. Was given a WW upon d/c after TKA)  Precautions:  Precautions  LE Weight Bearing Status: Weight Bearing as Tolerated  Medical Precautions: Fall precautions      Date/Time Vitals Session Patient Position Pulse Resp SpO2 BP MAP (mmHg)    05/21/25 0900 --  --  84  --  90 %  --  --     05/21/25 0958 --  --  --  --  91 %  --  --     05/21/25 1012 --  --  82  16  89 %  119/58  78              Objective   Pain:  Pain Assessment  Pain Assessment: 0-10  0-10 (Numeric) Pain Score: 10 - Worst possible pain (notified RN (stated she was not due for anything))  Pain Interventions: Repositioned, Ambulation/increased activity  Response to Interventions: Absence of non-verbal indicators of pain  Cognition:  Cognition  Overall Cognitive Status:  Within Functional Limits  Orientation Level: Oriented X4    General Assessments:     Activity Tolerance  Endurance: Decreased tolerance for upright activites (limited 2/2 pain)    Sensation  Sensation Comment: denies deficits    Strength  Strength Comments: RLE: 4-/5; L knee 3/5; ankle and hip 4-/5 (no episodes of knee buckling when amb.)     Coordination  Movements are Fluid and Coordinated: Yes       Dynamic Sitting Balance  Dynamic Sitting-Comments: good    Static Standing Balance  Static Standing-Comment/Number of Minutes: F+; with BUE support  Dynamic Standing Balance  Dynamic Standing-Comments: F+; with BUE support  Functional Assessments:  Bed Mobility 1  Bed Mobility 1: Supine to sitting, Sitting to supine  Level of Assistance 1: Minimum assistance  Bed Mobility Comments 1: required assist with LLE    Transfers  Transfer: Yes  Transfer 1  Technique 1: Sit to stand, Stand to sit  Transfer Device 1: Walker  Transfer Level of Assistance 1: Contact guard  Trials/Comments 1: cues for hand placement    Ambulation/Gait Training  Ambulation/Gait Training Performed: Yes  Ambulation/Gait Training 1  Surface 1: Level tile  Device 1: Rolling walker  Gait Support Devices: Gait belt  Assistance 1: Contact guard  Quality of Gait 1: Inconsistent stride length (cues for step to gait pattern)  Comments/Distance (ft) 1: 30  Extremity/Trunk Assessment  RLE   RLE : Within Functional Limits  LLE   LLE : Exceptions to WFL (-10 degrees extension to 60 degrees flexion)  Outcome Measures:  [facility] Basic Mobility  Turning from your back to your side while in a flat bed without using bedrails: A little  Moving from lying on your back to sitting on the side of a flat bed without using bedrails: A little  Moving to and from bed to chair (including a wheelchair): A little  Standing up from a chair using your arms (e.g. wheelchair or bedside chair): A little  To walk in hospital room: A little  Climbing 3-5 steps with railing: A lot  Basic  Mobility - Total Score: 17    Other Measures  5x Sit to Stand: unable to complete without UE support 2/2 weakness    Therapeutic Exercises:   B ankle pumps 10x, heel slides L 10x, quad sets L 10x, L SAQ AAROM 10x, L LAQ 10x, L knee flex in sitting 10x     Encounter Problems       Encounter Problems (Active)       Balance       STG - Maintains dynamic standing balance with 1 upper extremity support with >=good- balance        Start:  05/21/25    Expected End:  06/04/25               Mobility       LTG - Patient will ambulate community distance ELDON with WW       Start:  05/21/25    Expected End:  06/04/25               PT Transfers       STG - Patient will perform bed mobility IND       Start:  05/21/25    Expected End:  06/04/25            STG - Patient will transfer sit to and from stand ELDON with WW       Start:  05/21/25    Expected End:  06/04/25               Pain - Adult          Safety       Pt. will be IND with HEP        Start:  05/21/25    Expected End:  06/04/25                   Education Documentation  Home Exercise Program, taught by Mallory Soares, PT at 5/21/2025 10:15 AM.  Learner: Patient  Readiness: Acceptance  Method: Explanation  Response: Needs Reinforcement  Comment: Reviewed HEP    Education Comments  No comments found.

## 2025-05-21 NOTE — DISCHARGE INSTR - OTHER ORDERS
The Home Care Agency you selected will contact you within 72 hours to schedule a Home Care Visit. If you have any questions prior to the call, please feel free to contact  Home care contact number [phone] (HOME)     Thank you for choosing [facility] for your Health Care needs.  Also, thank you for allowing us to take you and your families preferences into account when determining your discharge plan.  Stay well!     You may receive a survey in the mail within the next couple weeks. Please take the time to complete it and return it. Your input is ALWAYS important to us. Thank you!  Your Care Transition Team - Shanell Villa  & Varsha      For questions about your medications listed on your discharge instructions, please call the Nurses Station at [phone].

## 2025-05-21 NOTE — PROGRESS NOTES
05/21/25 1030   Discharge Planning   Living Arrangements Spouse/significant other   Support Systems Spouse/significant other;Children   Assistance Needed Patient alert and oriented:  She lives with her  in a 2 story house with 2 dogs-they don't utilized the upstairs.  Patient is s/p left knee replacement and was sent home 5/19 with [facility]. The polar ice wrap is being used on her knee. She is having trouble ambulating-DME:  Rollator, walker, walk in shower, CPAP and 4L oxygen at home.  She uses [employer] for transportation - her  and she doesn't drive; they use [employer] Wheels   She is interested in SNF.   Type of Residence Private residence   Number of Stairs to Enter Residence 0  (ramp)   Number of Stairs Within Residence 6  (upstairs)   Do you have animals or pets at home? Yes   Type of Animals or Pets 2 dogs   Home or Post Acute Services Post acute facilities (Rehab/SNF/etc)   Type of Post Acute Facility Services Skilled nursing  (Interested in SNF)   Expected Discharge Disposition SNF   Financial Resource Strain   How hard is it for you to pay for the very basics like food, housing, medical care, and heating? Not very   Housing Stability   In the last 12 months, was there a time when you were not able to pay the mortgage or rent on time? N   In the past 12 months, how many times have you moved where you were living? 0   At any time in the past 12 months, were you homeless or living in a shelter (including now)? N   Transportation Needs   In the past 12 months, has lack of transportation kept you from medical appointments or from getting medications? no   In the past 12 months, has lack of transportation kept you from meetings, work, or from getting things needed for daily living? No   Patient Choice   Provider Choice list and CMS website (https://medicare.gov/care-compare#search) for post-acute Quality and Resource Measure Data were provided and reviewed with: Patient   Stroke Family  Assessment   Stroke Family Assessment Needed No   Intensity of Service   Intensity of Service 0-30 min     Confirmed address and pharmacy.  PCP is Sandip Willis.      1:06 pm  Patient informed of recommendation for MOD level of therapy/Skilled Nursing Facility upon discharge.  Printed insurance choice list for patient.  SNF choice (s) is/are:   [facility] sending referral.  [facility] following.

## 2025-05-21 NOTE — CARE PLAN
The patient's goals for the shift include      The clinical goals for the shift include pt will have pain control to lrft knee, will tolerate cold to left knee via polar cap machine, will have sufficient noxygen levels at or above 90% on 2lpm of supplemental o2 and with CPAP use,    PT maintained pain control through night with scheduled acetaminophen, did repoprt pain level at 7 in am. Oxycodone 10mg given at 0552. Will m,onitor for effectiveness. Pt utilized CPAP with o2 bleed in through night, oxygen levels maintained through night. Pt tolerated polar cold cube for cool compress on left knee while in bed.

## 2025-05-21 NOTE — PROGRESS NOTES
Occupational Therapy    Evaluation    Patient Name: Nataliia Pandya  MRN: [id number]  Department: [facility]  Room: [address] [place], [place] [address]  Today's Date: 5/21/2025  Time Calculation  Start Time: 1137  Stop Time: 1214  Time Calculation (min): 37 min    Assessment  IP OT Assessment  OT Assessment: RN cleared pt. Pt is a 58 y/o F presenting to [facility] with significant L knee pain from home- recent L TKA 5/19. OT orders received and occupational profile established via interview method, data gathering, and review of medical records to determine moderate complexity and establish OT POC. At this time, pt displays a decliner from PLOF including diminished- ADL/IADL independence, functional mobility balance, and task tolerance. Pt would benefit from OT services to address deficit areas to restore QOL and improve safety prior to d/c home. At end, pt positioned into recliner with alarm on. All needs met and RN notified.  Prognosis: Good  Barriers to Discharge Home: Physical needs, Caregiver assistance  Caregiver Assistance: Caregiver assistance needed per identified barriers - however, level of patient's required assistance exceeds assistance available at home  Physical Needs: Ambulating household distances limited by function/safety, 24hr mobility assistance needed, Intermittent ADL assistance needed, Weight bearing precautions unable to be safely maintained  Evaluation/Treatment Tolerance: Patient tolerated treatment well  Medical Staff Made Aware: Yes  End of Session Communication: PCT/NA/CTA  End of Session Patient Position: Up in chair, Alarm on    Plan:  Treatment Interventions: ADL retraining, Functional transfer training, UE strengthening/ROM, Endurance training, Patient/family training, Neuromuscular reeducation  OT Frequency: 4 times per week  OT Discharge Recommendations: Moderate intensity level of continued care  OT Recommended Transfer Status: Stand by assist, Assist of 1  OT - OK to Discharge: Yes    Subjective   Current  Problem:  1. Acute pain of right knee          OT Visit Info:  OT Received On: 05/21/25    General Visit Info:  General  Reason for Referral: Impaired ADLs/safety  Referred By: VALERIE Nichols  Past Medical History Relevant to Rehab: L TKA 5/19 (COPD, HPL, HTN, PONV, sleep apnea)  Family/Caregiver Present: No  Prior to Session Communication: Bedside nurse  Patient Position Received: Bed, 2 rail up, Alarm on  Preferred Learning Style: auditory, verbal  General Comment: Pt resting in bed, awake. Agreeable to work with therapy to complete OT evaluation. Cooperative and pleasant throughout.    Precautions:  LE Weight Bearing Status: Weight Bearing as Tolerated  Medical Precautions: Fall precautions  Post-Surgical Precautions: Left total knee precautions  Precautions Comment: ace wrap L LE     Date/Time Vitals Session Patient Position Pulse Resp SpO2 BP MAP (mmHg)    05/21/25 1200 --  --  87  --  92 %  --  --           Pain:  Pain Assessment  Pain Assessment: 0-10  0-10 (Numeric) Pain Score: 0 - No pain    Objective   Cognition:  Overall Cognitive Status: Within Functional Limits  Orientation Level: Oriented X4    Home Living:  Type of Home: House  Lives With: Spouse  Home Adaptive Equipment: Quad cane, Reacher, Walker rolling or standard  Home Layout: One level, Laundry main level  Home Access: Ramped entrance  Bathroom Shower/Tub: Walk-in shower  Bathroom Toilet: Adaptive toilet seating  Bathroom Equipment: Shower chair with back, Hand-held shower hose, Raised toilet seat with rails, Bedside commode  Bathroom Accessibility: No concern. States no current grab bars in shower, does plan to install some.  Home Living Comments: Sleeps in regular bed typically, will be sleeping in recliner.     Prior Function:  Level of [place]: Independent with ADLs and functional transfers, Independent with homemaking with ambulation  Receives Help From: Family (PRN- shared, per wife-  disavbled COPD dx and unable  to assist as much as prior.)  ADL Assistance: Independent  Homemaking Assistance: Independent  Ambulatory Assistance: Independent  Vocational: Retired  Hand Dominance: Right  Prior Function Comments: Prior to recent hospitalization, pt s/x LTKA 5/19 with d/c to home however due to increased pain presenting to ED. Prior to knee s/x, pt reports independence with ADLs/IADLs and functional mobility. No report of recent falls. Pt was driving prior.    IADL History:  Homemaking Responsibilities: Yes  Meal Prep Responsibility: Primary  Laundry Responsibility: Primary  Cleaning Responsibility: Primary  Shopping Responsibility: Primary  Current License: Yes  Mode of Transportation: Car    ADL:  Eating Assistance: Independent (anticipated)  Grooming Assistance: Independent  Bathing Assistance: Minimal (anticipated)  Bathing Deficit: Setup, Supervision/safety, Increased time to complete , Right lower leg including foot, Left lower leg including foot, Use of adaptive equipment  UE Dressing Assistance: Independent (anticipated- no concern from pt)  LE Dressing Assistance: Minimal  LE Dressing Deficit: Setup, Requires assistive device for steadying, Verbal cueing, Increased time to complete, Don/doff R sock, Don/doff L sock, Use of adaptive equipment  Toileting Assistance with Device: Stand by  Toileting Deficit: Supervison/safety, Steadying  Functional Assistance: Stand by  Functional Deficit: Steadying, Supervision/safety, Verbal cueing    Activity Tolerance:  Endurance: Tolerates 10 - 20 min exercise with multiple rests    Bed Mobility/Transfers:   Bed Mobility  Bed Mobility: Yes  Bed Mobility 1  Bed Mobility 1: Supine to sitting  Level of Assistance 1: Minimum assistance  Bed Mobility Comments 1: Min A required to clear LLE off from bed, pt able to mobilize UB/trunk via bedrails  Bed Mobility 2  Bed Mobility  2: Scooting  Level of Assistance 2: Close supervision  Bed Mobility Comments 2: Cueing to scoot for BLE foot placement  for increased balance- bed flat.    Transfers  Transfer: Yes  Transfer 1  Transfer From 1: Sit to  Transfer to 1: Stand  Technique 1: Sit to stand, Stand to sit  Transfer Device 1: Walker, Gait belt  Transfer Level of Assistance 1: Contact guard, Close supervision  Trials/Comments 1: Mult trials throughout, varied surfaces with consistent TESS-    Functional Mobility:  Functional Mobility  Functional Mobility Performed: Yes  Functional Mobility 1  Surface 1: Level tile  Device 1: Rolling walker  Functional Mobility Support Devices: Gait belt  Assistance 1: Contact guard  Comments 1: From EOB to BSC/BSC to chair    Sitting Balance:  Static Sitting Balance  Static Sitting-Balance Support: Feet supported  Static Sitting-Level of Assistance: Distant supervision  Dynamic Sitting Balance  Dynamic Sitting-Balance Support: Feet supported  Dynamic Sitting-Level of Assistance: Close supervision    Standing Balance:  Static Standing Balance  Static Standing-Balance Support: Bilateral upper extremity supported  Static Standing-Level of Assistance: Close supervision, Contact guard  Dynamic Standing Balance  Dynamic Standing-Balance Support: Bilateral upper extremity supported  Dynamic Standing-Level of Assistance: Contact guard    IADL's:   Homemaking Responsibilities: Yes  Meal Prep Responsibility: Primary  Laundry Responsibility: Primary  Cleaning Responsibility: Primary  Shopping Responsibility: Primary  Current License: Yes  Mode of Transportation: Car    Vision:   Vision - Basic Assessment  Current Vision: Wears glasses only for reading  Visual History: Cataracts    Sensation:  Light Touch: No apparent deficits (Prior R carpal tunnel release)    Strength:  Strength Comments: BUE WFL-- grossly 4-/4 out of 5 throughout. B  good.    Coordination:  Movements are Fluid and Coordinated: Yes     Hand Function:  Hand Function  Gross Grasp: Functional  Coordination: Functional    Extremities:   RUE   RUE : Within Functional  Limits and LUE   LUE: Within Functional Limits    Outcome Measures:   [facility] Daily Activity  Putting on and taking off regular lower body clothing: A little  Bathing (including washing, rinsing, drying): A little  Putting on and taking off regular upper body clothing: None  Toileting, which includes using toilet, bedpan or urinal: A little  Taking care of personal grooming such as brushing teeth: None  Eating Meals: None  Daily Activity - Total Score: 21     and OT Adult Other Outcome Measures  Other Outcome Measures: Modified Barthel Index- total score 65 indicating moderate dependence at home ( is disabled, limited assistance available. PLOF- pt independent with IADLs.)  Education Documentation  Precautions, taught by Rama Pettit OT at 5/21/2025  1:01 PM.  Learner: Patient  Readiness: Acceptance  Method: Explanation, Demonstration  Response: Verbalizes Understanding, Needs Reinforcement  Comment: AE education and training for LB dressingReview of LTKA precautions/WB status    ADL Training, taught by Rama Pettit OT at 5/21/2025  1:01 PM.  Learner: Patient  Readiness: Acceptance  Method: Explanation, Demonstration  Response: Verbalizes Understanding, Needs Reinforcement  Comment: AE education and training for LB dressingReview of LTKA precautions/WB status    Education Comments  No comments found.      Goals:   Encounter Problems       Encounter Problems (Active)       ADLs       Patient with complete lower body dressing with stand by assist level of assistance donning and doffing all LE clothes  with PRN adaptive equipment while supported sitting       Start:  05/21/25    Expected End:  06/04/25            Patient will complete toileting including hygiene clothing management/hygiene with modified independent level of assistance bedside commode        Start:  05/21/25    Expected End:  06/04/25       (pt preference BSC over toilet d/t comfort)            BALANCE       Pt will maintain dynamic  standing balance during ADL task with stand by assist level of assistance in order to demonstrate decreased risk of falling and improved postural control.       Start:  05/21/25    Expected End:  06/04/25       Dynamic balance during ADLs- grooming at sink, toileting with pericare/clothing management            COGNITION/SAFETY       Patient will recall and adhere to weight bearing and /or ROM restrictions with all ADL and functional mobility in order to promote healing and safety with functional tasks       Start:  05/21/25    Expected End:  06/04/25               MOBILITY       Patient will perform Functional mobility mod  Household distances/Community Distances with stand by assist level of assistance and least restrictive device in order to improve safety and functional mobility.       Start:  05/21/25    Expected End:  06/04/25               TRANSFERS       Patient will complete sit to stand transfer with modified independent level of assistance and least restrictive device in order to improve safety and prepare for out of bed mobility.       Start:  05/21/25    Expected End:  06/04/25

## 2025-05-21 NOTE — CONSULTS
Patients name: Nataliia Pandya  Room # 208  Do you have any home inhalers? Yes, Albuterol and Dulera      Do you get relief when using it? Pt states that she does      Spacer education and have them teach back. Pt has spacer at home and was just instructed on how to use it at last pulmonology visit.  If using home inhaler do you rinse your mouth? Yes pt states she rinses her mouth after her Dulera  Any barriers? no  When were you diagnosed with COPD? Pt states she was diagnosed with COPD a couple years ago.  Any previous PFTs? Yes   If so, when? 4/10/2024   explain the importance of a PFT. Importance of PFT explained  Do you have a pulmonary Dr.? Yes   Name: Waqas Leija  Phone number: [phone]  Date of last appt: 2/20/2025, pt states she has another appt. In June.  Pulmonary cards given. Pulmonary cards given, pt already has a pulmonologist.  Do you currently smoke or vape or have you ever? Yes    Quit date or planning to quit? Pt does not seem serious about quitting, states she will quit when she is ready.  How long have you smoked for? Pt has been smoking for over 40 years   PPD? 1ppd    Smoking education given and class information given. Smoking education and class information given.   Get orders for nicotine supplement. Pt has nicotine supplement orders  Do you have a Primary Dr.? Yes  Name: Sandip Schmitz DO  Phone number: [phone]  Date of last appt: pt is unsure, states it was recently   Do you have any home O2 or CPAP/BiPAP? Yes, pt has home unit    Settings for CPAP/BiPAP: auto 14.8/10.8  What company?  Pt unsure, states either [employer], or [email or web address].              How much O2 at home? Pt states she used to have home O2 but they took it away.  Last time 6mwt was done? Pt states she had one a week ago at [facility]   Educate on acceptable O2 levels and the importance of monitoring O2 at home. Complete home O2 evaluation if needed. Acceptable O2 levels and the importance of monitoring O2 at home  educated. Home O2 eval will be completed upon discharge if needed.            Megha Comer, RRT

## 2025-05-21 NOTE — H&P
History Of Present Illness  Nataliia Pandya is a 59 y.o. female with past medical history of hypertension, hyperlipidemia, diverticulitis, lupus, CHRISTIAN with CPAP use, COPD who presented to the emergency department yesterday from home with complaints of severe pain and swelling in her left knee.  She had a left total knee replacement on May 19 at [facility] and was discharged home with therapy.  She states that she took an oxycodone at noon and it did not help her pain so she called EMS.  Her  is disabled and unable to help her at home.  In the ED her vital signs were stable, urinalysis was bland and there were no significant lab derangements.  X-ray of the left knee showed expected postoperative appearance.  She was medicated for pain and subsequently admitted to Medicine for further treatment and evaluation and close monitoring of hemodynamic status with likely placement in rehab facility.    VS: T37, HR 78, respirations 14, /64, 93% on room air  UA: [place]  Significant labs: Sodium 132,  Diagnostics: X-ray left knee: Expected postoperative appearance    Past Medical History  Past Medical History:   Diagnosis Date    Arthritis     Carotid artery stenosis     Chronic pain     COPD (chronic obstructive pulmonary disease) (Multi)     Diverticulitis     Dysfunctional uterine bleeding     Hyperlipidemia     Hypertension     Lung nodules     Lupus (systemic lupus erythematosus) (Multi)     Morbid obesity (Multi)     PONV (postoperative nausea and vomiting)     Scoliosis     Sleep apnea     Uses roller walker     Wears glasses      Surgical History  Past Surgical History:   Procedure Laterality Date    CARPAL TUNNEL RELEASE Right     CATARACT EXTRACTION       SECTION, LOW TRANSVERSE      CHOLECYSTECTOMY      COLONOSCOPY      CYSTOSCOPY      DILATION AND CURETTAGE OF UTERUS      FOOT Right     HYSTERECTOMY      OOPHORECTOMY Right     SALPINGECTOMY Bilateral     UPPER GASTROINTESTINAL ENDOSCOPY         Social History  She reports that she has been smoking cigarettes. She has never used smokeless tobacco. She reports that she does not drink alcohol and does not use drugs.    Family History  Family History   Problem Relation Name Age of Onset    Hypertension Mother      Hypertension Father      Hypertension Sister        Allergies  Patient has no known allergies.    Review of Systems   Constitutional:  Positive for activity change.   HENT: Negative.     Eyes: Negative.    Respiratory: Negative.     Cardiovascular: Negative.    Gastrointestinal: Negative.    Endocrine: Negative.    Genitourinary: Negative.    Musculoskeletal:  Positive for arthralgias, gait problem and joint swelling.   Skin: Negative.    Allergic/Immunologic: Negative.    Hematological: Negative.    Psychiatric/Behavioral: Negative.        Physical Exam  Constitutional:       General: She is not in acute distress.     Appearance: Normal appearance. She is not toxic-appearing.   HENT:      Head: Normocephalic and atraumatic.      Mouth/Throat:      Mouth: Mucous membranes are moist.   Eyes:      Extraocular Movements: Extraocular movements intact.      Pupils: Pupils are equal, round, and reactive to light.   Cardiovascular:      Rate and Rhythm: Normal rate and regular rhythm.      Pulses: Normal pulses.      Heart sounds: Normal heart sounds. No murmur heard.     No gallop.   Pulmonary:      Effort: Pulmonary effort is normal. No respiratory distress.      Breath sounds: Normal breath sounds. No wheezing, rhonchi or rales.   Abdominal:      General: Bowel sounds are normal. There is no distension.      Palpations: Abdomen is soft.      Tenderness: There is no abdominal tenderness. There is no guarding or rebound.   Musculoskeletal:         General: Tenderness present. No deformity or signs of injury. Normal range of motion.      Cervical back: Normal range of motion and neck supple.      Left lower leg: Edema present.      Comments: Left knee    Skin:     General: Skin is warm and dry.      Capillary Refill: Capillary refill takes less than 2 seconds.      Coloration: Skin is not jaundiced.      Findings: No bruising or rash.   Neurological:      General: No focal deficit present.      Mental Status: She is alert and oriented to person, place, and time. Mental status is at baseline.      Cranial Nerves: No cranial nerve deficit.      Sensory: No sensory deficit.      Motor: No weakness.      Gait: Gait normal.   Psychiatric:         Mood and Affect: Mood normal.         Behavior: Behavior normal.         Thought Content: Thought content normal.         Judgment: Judgment normal.        Last Recorded Vitals  Blood pressure 130/74, pulse 85, temperature 36.1 °C (97 °F), temperature source Temporal, resp. rate 16, height 1.524 m (5'), weight 98 kg (216 lb 0.8 oz), SpO2 94%.    Scheduled medications  Scheduled Medications[1]  Continuous medications  Continuous Medications[2]  PRN medications  PRN Medications[3]    Relevant Results  XR knee left 4+ views  Result Date: 5/20/2025  Expected postoperative appearance. Signed by Martni Mendes MD    XR knee left 1-2 views  Result Date: 5/19/2025  No osseous injury or hardware complication is evident.   MACRO: None   Signed by: Dalton Chilel 5/19/2025 12:07 PM Dictation workstation:   [id number]      Latest Reference Range & Units 05/20/25 16:59 05/21/25 06:04   GLUCOSE 74 - 99 mg/dL 114 (H) 119 (H)   SODIUM 136 - 145 mmol/L 132 (L) 133 (L)   POTASSIUM 3.5 - 5.3 mmol/L 3.6 3.8   CHLORIDE 98 - 107 mmol/L 103 98   Bicarbonate 21 - 32 mmol/L 23 27   Anion Gap 10 - 20 mmol/L 10 12   Blood Urea Nitrogen 6 - 23 mg/dL 16 11   Creatinine 0.50 - 1.05 mg/dL 0.56 0.52   EGFR >60 mL/min/1.73m*2 >90 >90   Calcium 8.6 - 10.3 mg/dL 7.4 (L) 8.9   WBC 4.4 - 11.3 x10*3/uL 10.3 8.8   nRBC 0.0 - 0.0 /100 WBCs 0.0 0.0   RBC 4.00 - 5.20 x10*6/uL 3.80 (L) 3.77 (L)   HEMOGLOBIN 12.0 - 16.0 g/dL 12.3 11.9 (L)   HEMATOCRIT 36.0 - 46.0 %  36.5 36.5   MCV 80 - 100 fL 96 97   MCH 26.0 - 34.0 pg 32.4 31.6   MCHC 32.0 - 36.0 g/dL 33.7 32.6   RED CELL DISTRIBUTION WIDTH 11.5 - 14.5 % 13.8 13.6   Platelets 150 - 450 x10*3/uL 118 (L) 121 (L)     Assessment & Plan  Unable to ambulate    S/P TKR (total knee replacement), left    CAD (coronary artery disease)    HTN (hypertension)    HLD (hyperlipidemia)    COPD (chronic obstructive pulmonary disease) (Multi)    Neuropathy    Nicotine abuse    GERD (gastroesophageal reflux disease)      Unable to ambulate  S/P TKR (total knee replacement), left  Neuropathy  -TKR on 5/19/25 with Dr Miramontes, could not tolerate pain at home  -Left knee xray: Expected postoperative appearance   -pain meds as needed  -Continue gabapentin  -PT/OT  -TCC for DC planning  - Safety precautions  - Ice as needed  - Continue Keflex, doxycycline    CAD (coronary artery disease)  HTN (hypertension)  HLD (hyperlipidemia)  - Continue aspirin, atenolol, atorvastatin, hydrochlorothiazide, lisinopril  - Telemetry    COPD (chronic obstructive pulmonary disease) (Multi)  Nicotine abuse  -Continue Pulmicort, Perforomist  -Nicotine patch as needed    GERD (gastroesophageal reflux disease)  - Continue PPI    GI ppx: PPI  DVT ppx: Enoxaparin  Fluids: As needed  Electrolytes: replace as needed  Nutrition: Regular diet  Adjuncts: PIV  Code Status: Full code  Pt requires inpatient stay at this time.    MARK Nichols-CNP    Attending Attestation:    I was present with the APRCHACORTA-CNP who participated in the documentation of this note. I have personally seen and re-examined the patient and performed the medical decision-making components (assessment and plan of care). I have reviewed the documentation and verified the findings in the note as written with additions or exceptions as stated in the body of this note.    59 year old female with past medical history of HTN, HLD, Diverticulitis, lupus, CHRISTIAN with CPAP, COPD who presented to the ED from home  with complaints of severe pain and swelling in her left knee. She had total knee replacement on 5/19/25 at [facility] and was discharged home with therapy. She took oxycodone for the pain and it did not help so she called EMS. Her  is disabled so no one is able to help her at the home.   Vitals were stable on arrival.  Continue home medicine, PRN pain medications.  PT/ OT evaluation and placement if needed.    Carlos Littlejohn MD  Internal Medicine.       [1] acetaminophen, 975 mg, oral, q6h  aspirin, 81 mg, oral, BID  atenolol, 25 mg, oral, Daily  atorvastatin, 20 mg, oral, Daily  budesonide, 0.25 mg, nebulization, BID  calcium carbonate-vitamin D3, 1 tablet, oral, Daily  cefadroxil, 500 mg, oral, BID  dicyclomine, 20 mg, oral, TID  doxycycline, 100 mg, oral, Daily  enoxaparin, 40 mg, subcutaneous, q24h  formoterol, 20 mcg, nebulization, BID  gabapentin, 300 mg, oral, TID  hydroCHLOROthiazide, 25 mg, oral, Daily  lisinopril, 10 mg, oral, Daily  nicotine, 1 patch, transdermal, Daily  pantoprazole, 40 mg, oral, Daily before breakfast   Or  pantoprazole, 40 mg, intravenous, Daily before breakfast  tamsulosin, 0.4 mg, oral, Daily     [2]    [3] PRN medications: melatonin, ondansetron **OR** ondansetron, oxyCODONE, oxyCODONE, oxygen, oxygen, sennosides-docusate sodium

## 2025-05-22 LAB
ANION GAP SERPL CALC-SCNC: 11 MMOL/L (ref 10–20)
BUN SERPL-MCNC: 18 MG/DL (ref 6–23)
CALCIUM SERPL-MCNC: 8.7 MG/DL (ref 8.6–10.3)
CHLORIDE SERPL-SCNC: 99 MMOL/L (ref 98–107)
CO2 SERPL-SCNC: 29 MMOL/L (ref 21–32)
CREAT SERPL-MCNC: 0.59 MG/DL (ref 0.5–1.05)
EGFRCR SERPLBLD CKD-EPI 2021: >90 ML/MIN/1.73M*2
ERYTHROCYTE [DISTWIDTH] IN BLOOD BY AUTOMATED COUNT: 13.6 % (ref 11.5–14.5)
GLUCOSE SERPL-MCNC: 108 MG/DL (ref 74–99)
HCT VFR BLD AUTO: 34 % (ref 36–46)
HGB BLD-MCNC: 11.1 G/DL (ref 12–16)
MCH RBC QN AUTO: 31.4 PG (ref 26–34)
MCHC RBC AUTO-ENTMCNC: 32.6 G/DL (ref 32–36)
MCV RBC AUTO: 96 FL (ref 80–100)
NRBC BLD-RTO: 0 /100 WBCS (ref 0–0)
PLATELET # BLD AUTO: 127 X10*3/UL (ref 150–450)
POTASSIUM SERPL-SCNC: 3.6 MMOL/L (ref 3.5–5.3)
RBC # BLD AUTO: 3.54 X10*6/UL (ref 4–5.2)
SODIUM SERPL-SCNC: 135 MMOL/L (ref 136–145)
WBC # BLD AUTO: 7.8 X10*3/UL (ref 4.4–11.3)

## 2025-05-22 PROCEDURE — 2500000004 HC RX 250 GENERAL PHARMACY W/ HCPCS (ALT 636 FOR OP/ED): Mod: JZ,SE | Performed by: NURSE PRACTITIONER

## 2025-05-22 PROCEDURE — 97110 THERAPEUTIC EXERCISES: CPT | Mod: GP,CQ

## 2025-05-22 PROCEDURE — 96372 THER/PROPH/DIAG INJ SC/IM: CPT | Performed by: NURSE PRACTITIONER

## 2025-05-22 PROCEDURE — 97530 THERAPEUTIC ACTIVITIES: CPT | Mod: GP,CQ

## 2025-05-22 PROCEDURE — 36415 COLL VENOUS BLD VENIPUNCTURE: CPT | Performed by: NURSE PRACTITIONER

## 2025-05-22 PROCEDURE — 97530 THERAPEUTIC ACTIVITIES: CPT | Mod: GO

## 2025-05-22 PROCEDURE — 2500000005 HC RX 250 GENERAL PHARMACY W/O HCPCS: Mod: SE | Performed by: NURSE PRACTITIONER

## 2025-05-22 PROCEDURE — 99233 SBSQ HOSP IP/OBS HIGH 50: CPT | Performed by: NURSE PRACTITIONER

## 2025-05-22 PROCEDURE — 85027 COMPLETE CBC AUTOMATED: CPT | Performed by: NURSE PRACTITIONER

## 2025-05-22 PROCEDURE — 80048 BASIC METABOLIC PNL TOTAL CA: CPT | Performed by: NURSE PRACTITIONER

## 2025-05-22 PROCEDURE — 2500000001 HC RX 250 WO HCPCS SELF ADMINISTERED DRUGS (ALT 637 FOR MEDICARE OP): Mod: SE | Performed by: NURSE PRACTITIONER

## 2025-05-22 PROCEDURE — 2500000002 HC RX 250 W HCPCS SELF ADMINISTERED DRUGS (ALT 637 FOR MEDICARE OP, ALT 636 FOR OP/ED): Mod: SE | Performed by: NURSE PRACTITIONER

## 2025-05-22 PROCEDURE — 94760 N-INVAS EAR/PLS OXIMETRY 1: CPT

## 2025-05-22 PROCEDURE — 2500000004 HC RX 250 GENERAL PHARMACY W/ HCPCS (ALT 636 FOR OP/ED): Mod: SE | Performed by: HOSPITALIST

## 2025-05-22 PROCEDURE — 97116 GAIT TRAINING THERAPY: CPT | Mod: GP,CQ

## 2025-05-22 PROCEDURE — G0378 HOSPITAL OBSERVATION PER HR: HCPCS

## 2025-05-22 PROCEDURE — 94640 AIRWAY INHALATION TREATMENT: CPT

## 2025-05-22 PROCEDURE — S4991 NICOTINE PATCH NONLEGEND: HCPCS | Mod: SE | Performed by: NURSE PRACTITIONER

## 2025-05-22 RX ORDER — POLYETHYLENE GLYCOL 3350 17 G/17G
17 POWDER, FOR SOLUTION ORAL DAILY
Status: DISCONTINUED | OUTPATIENT
Start: 2025-05-22 | End: 2025-05-23 | Stop reason: HOSPADM

## 2025-05-22 RX ORDER — OXYCODONE AND ACETAMINOPHEN 5; 325 MG/1; MG/1
1 TABLET ORAL EVERY 6 HOURS PRN
Qty: 5 TABLET | Refills: 0 | Status: SHIPPED | OUTPATIENT
Start: 2025-05-22

## 2025-05-22 RX ORDER — AMOXICILLIN 250 MG
1 CAPSULE ORAL NIGHTLY
Status: DISCONTINUED | OUTPATIENT
Start: 2025-05-22 | End: 2025-05-23 | Stop reason: HOSPADM

## 2025-05-22 RX ADMIN — TAMSULOSIN HYDROCHLORIDE 0.4 MG: 0.4 CAPSULE ORAL at 09:27

## 2025-05-22 RX ADMIN — DICYCLOMINE HYDROCHLORIDE 20 MG: 10 CAPSULE ORAL at 11:36

## 2025-05-22 RX ADMIN — OXYCODONE 10 MG: 5 TABLET ORAL at 11:36

## 2025-05-22 RX ADMIN — POLYETHYLENE GLYCOL 3350 17 G: 17 POWDER, FOR SOLUTION ORAL at 17:47

## 2025-05-22 RX ADMIN — POLYETHYLENE GLYCOL 3350 17 G: 17 POWDER, FOR SOLUTION ORAL at 12:21

## 2025-05-22 RX ADMIN — BUDESONIDE 0.25 MG: 0.25 INHALANT RESPIRATORY (INHALATION) at 22:43

## 2025-05-22 RX ADMIN — NICOTINE 1 PATCH: 14 PATCH, EXTENDED RELEASE TRANSDERMAL at 09:27

## 2025-05-22 RX ADMIN — ASPIRIN 81 MG CHEWABLE TABLET 81 MG: 81 TABLET CHEWABLE at 21:25

## 2025-05-22 RX ADMIN — ACETAMINOPHEN 325MG 975 MG: 325 TABLET ORAL at 06:21

## 2025-05-22 RX ADMIN — ACETAMINOPHEN 325MG 975 MG: 325 TABLET ORAL at 00:11

## 2025-05-22 RX ADMIN — ACETAMINOPHEN 325MG 975 MG: 325 TABLET ORAL at 11:36

## 2025-05-22 RX ADMIN — FORMOTEROL FUMARATE DIHYDRATE 20 MCG: 20 SOLUTION RESPIRATORY (INHALATION) at 22:43

## 2025-05-22 RX ADMIN — GABAPENTIN 300 MG: 300 CAPSULE ORAL at 14:23

## 2025-05-22 RX ADMIN — DICYCLOMINE HYDROCHLORIDE 20 MG: 10 CAPSULE ORAL at 17:47

## 2025-05-22 RX ADMIN — ENOXAPARIN SODIUM 40 MG: 100 INJECTION SUBCUTANEOUS at 17:47

## 2025-05-22 RX ADMIN — DOXYCYCLINE HYCLATE 100 MG: 100 TABLET, COATED ORAL at 09:27

## 2025-05-22 RX ADMIN — CEPHALEXIN 500 MG: 250 CAPSULE ORAL at 06:21

## 2025-05-22 RX ADMIN — GABAPENTIN 300 MG: 300 CAPSULE ORAL at 09:28

## 2025-05-22 RX ADMIN — OXYCODONE 10 MG: 5 TABLET ORAL at 21:24

## 2025-05-22 RX ADMIN — FORMOTEROL FUMARATE DIHYDRATE 20 MCG: 20 SOLUTION RESPIRATORY (INHALATION) at 09:46

## 2025-05-22 RX ADMIN — LISINOPRIL 10 MG: 10 TABLET ORAL at 09:27

## 2025-05-22 RX ADMIN — PANTOPRAZOLE SODIUM 40 MG: 40 TABLET, DELAYED RELEASE ORAL at 06:21

## 2025-05-22 RX ADMIN — HYDROCHLOROTHIAZIDE 25 MG: 25 TABLET ORAL at 09:27

## 2025-05-22 RX ADMIN — CEPHALEXIN 500 MG: 250 CAPSULE ORAL at 21:25

## 2025-05-22 RX ADMIN — BUDESONIDE 0.25 MG: 0.25 INHALANT RESPIRATORY (INHALATION) at 09:46

## 2025-05-22 RX ADMIN — OXYCODONE 10 MG: 5 TABLET ORAL at 16:14

## 2025-05-22 RX ADMIN — ATORVASTATIN CALCIUM 20 MG: 10 TABLET, FILM COATED ORAL at 21:25

## 2025-05-22 RX ADMIN — ATENOLOL 25 MG: 25 TABLET ORAL at 09:28

## 2025-05-22 RX ADMIN — DICYCLOMINE HYDROCHLORIDE 20 MG: 10 CAPSULE ORAL at 09:28

## 2025-05-22 RX ADMIN — ASPIRIN 81 MG CHEWABLE TABLET 81 MG: 81 TABLET CHEWABLE at 09:27

## 2025-05-22 RX ADMIN — SENNOSIDES AND DOCUSATE SODIUM 1 TABLET: 50; 8.6 TABLET ORAL at 21:23

## 2025-05-22 RX ADMIN — CEPHALEXIN 500 MG: 250 CAPSULE ORAL at 14:23

## 2025-05-22 RX ADMIN — GABAPENTIN 300 MG: 300 CAPSULE ORAL at 21:25

## 2025-05-22 RX ADMIN — Medication 2 L/MIN: at 22:46

## 2025-05-22 RX ADMIN — Medication 1 TABLET: at 09:28

## 2025-05-22 ASSESSMENT — COGNITIVE AND FUNCTIONAL STATUS - GENERAL
CLIMB 3 TO 5 STEPS WITH RAILING: A LOT
MOVING FROM LYING ON BACK TO SITTING ON SIDE OF FLAT BED WITH BEDRAILS: A LITTLE
DRESSING REGULAR LOWER BODY CLOTHING: A LITTLE
WALKING IN HOSPITAL ROOM: A LITTLE
MOVING TO AND FROM BED TO CHAIR: A LITTLE
TURNING FROM BACK TO SIDE WHILE IN FLAT BAD: A LITTLE
HELP NEEDED FOR BATHING: A LITTLE
TURNING FROM BACK TO SIDE WHILE IN FLAT BAD: A LITTLE
DRESSING REGULAR LOWER BODY CLOTHING: A LOT
PERSONAL GROOMING: A LITTLE
TOILETING: A LOT
WALKING IN HOSPITAL ROOM: A LITTLE
DAILY ACTIVITIY SCORE: 16
HELP NEEDED FOR BATHING: A LITTLE
DAILY ACTIVITIY SCORE: 21
DRESSING REGULAR UPPER BODY CLOTHING: A LITTLE
EATING MEALS: A LITTLE
STANDING UP FROM CHAIR USING ARMS: A LITTLE
MOBILITY SCORE: 17
STANDING UP FROM CHAIR USING ARMS: A LITTLE
CLIMB 3 TO 5 STEPS WITH RAILING: A LOT
MOVING TO AND FROM BED TO CHAIR: A LITTLE
TOILETING: A LITTLE
MOBILITY SCORE: 18

## 2025-05-22 ASSESSMENT — PAIN - FUNCTIONAL ASSESSMENT
PAIN_FUNCTIONAL_ASSESSMENT: 0-10

## 2025-05-22 ASSESSMENT — PAIN DESCRIPTION - LOCATION
LOCATION: KNEE

## 2025-05-22 ASSESSMENT — PAIN SCALES - GENERAL
PAINLEVEL_OUTOF10: 10 - WORST POSSIBLE PAIN
PAINLEVEL_OUTOF10: 5 - MODERATE PAIN
PAINLEVEL_OUTOF10: 2
PAINLEVEL_OUTOF10: 10 - WORST POSSIBLE PAIN
PAINLEVEL_OUTOF10: 0 - NO PAIN
PAINLEVEL_OUTOF10: 8
PAINLEVEL_OUTOF10: 5 - MODERATE PAIN
PAINLEVEL_OUTOF10: 5 - MODERATE PAIN

## 2025-05-22 ASSESSMENT — PAIN DESCRIPTION - ORIENTATION
ORIENTATION: LEFT

## 2025-05-22 ASSESSMENT — PAIN DESCRIPTION - DESCRIPTORS: DESCRIPTORS: SORE;SHARP

## 2025-05-22 NOTE — CARE PLAN
The clinical goals for the shift include Patient will remain free fro falls/injury this shift.    Over the shift, the patient did remain free from falls/ injury this shift. All vitals were stable. Client reported pain that was treated with PRN medication. Appetite was good. Client ambulated with PT  and OT this shift. Client in bed with call bell in reach.

## 2025-05-22 NOTE — CARE PLAN
The patient's goals for the shift include      The clinical goals for the shift include pt will rate pain at 4 or less this shift    Pt on meds per orders with pain rating < 4 overnight

## 2025-05-22 NOTE — PROGRESS NOTES
05/22/25 1048   Discharge Planning   Home or Post Acute Services Post acute facilities (Rehab/SNF/etc)   Type of Post Acute Facility Services Skilled nursing   Expected Discharge Disposition SNF  (Accepted at [facility]-precert auth. initiated.)   Does the patient need discharge transport arranged? Yes   RoundTrip coordination needed? Yes   Intensity of Service   Intensity of Service >30 min     12:10 pm  Updates sent to [facility]- precert authorization initiated by  Direct Precert Submission team  4:15 PM  Precert authorization has not been received- patient is not cleared for discharge.  TCC following.

## 2025-05-22 NOTE — CONSULTS
Nutrition Note:   Reason for Assessment: Dietitian discretion (Pt requested to talk with dietitian about ONS.)    Patient is a 59 y.o. female presenting with Unable to ambulate.     Pt reports that she recently had knee surgery and wanted to have Ensure to help with protein intake. Reviewed other ONS with protein. Pt requested the Ensure. Added it once a day.    Time Spent (min): 15 minutes

## 2025-05-22 NOTE — PROGRESS NOTES
Nataliia Pandya is a 59 y.o. female on day 0 of admission presenting with Unable to ambulate.    Subjective   She is sitting up in a chair eating breakfast, bilateral legs elevated.  Has been using ice intermittently on her left knee and taking pain meds as needed.  Left foot warm with palpable pulses.  She has no complaints other than the pain she feels in her knee when she is moving around.  All questions answered.       Objective     Physical Exam  Constitutional:       General: She is not in acute distress.     Appearance: Normal appearance. She is not toxic-appearing.   HENT:      Head: Normocephalic and atraumatic.      Mouth/Throat:      Mouth: Mucous membranes are moist.   Eyes:      Extraocular Movements: Extraocular movements intact.      Pupils: Pupils are equal, round, and reactive to light.   Cardiovascular:      Rate and Rhythm: Normal rate and regular rhythm.      Pulses: Normal pulses.      Heart sounds: Normal heart sounds. No murmur heard.     No gallop.   Pulmonary:      Effort: Pulmonary effort is normal. No respiratory distress.      Breath sounds: Normal breath sounds. No wheezing, rhonchi or rales.   Abdominal:      General: Bowel sounds are normal. There is no distension.      Palpations: Abdomen is soft.      Tenderness: There is no abdominal tenderness. There is no guarding or rebound.   Musculoskeletal:         General: Tenderness present. No swelling, deformity or signs of injury. Normal range of motion.      Cervical back: Normal range of motion and neck supple.      Left lower leg: Edema present.      Comments: Left knee   Skin:     General: Skin is warm and dry.      Capillary Refill: Capillary refill takes less than 2 seconds.      Coloration: Skin is not jaundiced.      Findings: Bruising present. No rash.   Neurological:      General: No focal deficit present.      Mental Status: She is alert and oriented to person, place, and time. Mental status is at baseline.      Cranial Nerves:  No cranial nerve deficit.      Sensory: No sensory deficit.      Motor: No weakness.      Gait: Gait normal.   Psychiatric:         Mood and Affect: Mood normal.         Behavior: Behavior normal.         Thought Content: Thought content normal.         Judgment: Judgment normal.       Last Recorded Vitals  Blood pressure 119/67, pulse 89, temperature 36.4 °C (97.5 °F), temperature source Temporal, resp. rate 17, height 1.524 m (5'), weight 98 kg (216 lb 0.8 oz), SpO2 94%.  Intake/Output last 3 Shifts:  I/O last 3 completed shifts:  In: 1040 (10.6 mL/kg) [P.O.:1040]  Out: - (0 mL/kg)   Weight: 98 kg     Relevant Results  XR knee left 4+ views  Result Date: 5/20/2025  Expected postoperative appearance. Signed by Martin Mendes MD     XR knee left 1-2 views  Result Date: 5/19/2025  No osseous injury or hardware complication is evident.   MACRO: None   Signed by: Dalton Chilel 5/19/2025 12:07 PM Dictation workstation:   [id number]     Latest Reference Range & Units 05/20/25 16:59 05/21/25 06:04 05/22/25 06:16   GLUCOSE 74 - 99 mg/dL 114 (H) 119 (H) 108 (H)   SODIUM 136 - 145 mmol/L 132 (L) 133 (L) 135 (L)   POTASSIUM 3.5 - 5.3 mmol/L 3.6 3.8 3.6   CHLORIDE 98 - 107 mmol/L 103 98 99   Bicarbonate 21 - 32 mmol/L 23 27 29   Anion Gap 10 - 20 mmol/L 10 12 11   Blood Urea Nitrogen 6 - 23 mg/dL 16 11 18   Creatinine 0.50 - 1.05 mg/dL 0.56 0.52 0.59   EGFR >60 mL/min/1.73m*2 >90 >90 >90   Calcium 8.6 - 10.3 mg/dL 7.4 (L) 8.9 8.7   WBC 4.4 - 11.3 x10*3/uL 10.3 8.8 7.8   nRBC 0.0 - 0.0 /100 WBCs 0.0 0.0 0.0   RBC 4.00 - 5.20 x10*6/uL 3.80 (L) 3.77 (L) 3.54 (L)   HEMOGLOBIN 12.0 - 16.0 g/dL 12.3 11.9 (L) 11.1 (L)   HEMATOCRIT 36.0 - 46.0 % 36.5 36.5 34.0 (L)   MCV 80 - 100 fL 96 97 96   MCH 26.0 - 34.0 pg 32.4 31.6 31.4   MCHC 32.0 - 36.0 g/dL 33.7 32.6 32.6   RED CELL DISTRIBUTION WIDTH 11.5 - 14.5 % 13.8 13.6 13.6   Platelets 150 - 450 x10*3/uL 118 (L) 121 (L) 127 (L)     Assessment & Plan  Unable to ambulate    S/P TKR  (total knee replacement), left    CAD (coronary artery disease)    HTN (hypertension)    HLD (hyperlipidemia)    COPD (chronic obstructive pulmonary disease) (Multi)    Neuropathy    Nicotine abuse    GERD (gastroesophageal reflux disease)      Unable to ambulate  S/P TKR (total knee replacement), left  Neuropathy  -TKR on 5/19/25 with Dr Miramontes, could not tolerate pain at home  -Left knee xray: Expected postoperative appearance   -pain meds as needed  -Continue gabapentin  -PT/OT  -TCC for DC planning  - Safety precautions  - Ice as needed  - Continue Keflex, doxycycline     CAD (coronary artery disease)  HTN (hypertension)  HLD (hyperlipidemia)  - Continue aspirin, atenolol, atorvastatin, hydrochlorothiazide, lisinopril  - Telemetry     COPD (chronic obstructive pulmonary disease) (Multi)  Nicotine abuse  -Continue Pulmicort, Perforomist  -Nicotine patch as needed     GERD (gastroesophageal reflux disease)  - Continue PPI     GI ppx: PPI  DVT ppx: Enoxaparin  Fluids: As needed  Electrolytes: replace as needed  Nutrition: Regular diet  Adjuncts: PIV  Code Status: Full code  Pt requires inpatient stay at this time.  Patricia Henderson APRN-CNP

## 2025-05-22 NOTE — PROGRESS NOTES
Occupational Therapy    Occupational Therapy Treatment    Name: Nataliia Pandya  MRN: [id number]  Department: [facility]  Room: [address] [place] [address]  Date: 05/22/25  Time Calculation  Start Time: 1300  Stop Time: 1328  Time Calculation (min): 28 min    Assessment:  OT Assessment: Pt is a 58 yo F referred to occupational therapy for impaired self-care and functional mobility 2/2 hospitalization for L TKA. Pt demonstrates good progress towards goals this date requiring CGA for STS from bed/toilet and CGA for LE dressing. Pt w/ 1 small LOB when standing from toilet w/ min A to correct. Pt also completed functional mobility w/ CGA and FWW. Pt would continue to benefit from OT services at the MOD intensity level to increase safety and functional independence.  Prognosis: Good  Barriers to Discharge Home: Physical needs, Caregiver assistance  Caregiver Assistance: Caregiver assistance needed per identified barriers - however, level of patient's required assistance exceeds assistance available at home  Physical Needs: Ambulating household distances limited by function/safety, 24hr mobility assistance needed, Intermittent ADL assistance needed, Weight bearing precautions unable to be safely maintained  Evaluation/Treatment Tolerance: Patient tolerated treatment well  Medical Staff Made Aware: Yes  End of Session Communication: Bedside nurse  End of Session Patient Position: Bed, 2 rail up, Alarm on  Plan:  Treatment Interventions: ADL retraining, Functional transfer training, Endurance training, Patient/family training, Equipment evaluation/education, Compensatory technique education  OT Frequency: 4 times per week  OT Discharge Recommendations: Moderate intensity level of continued care  Equipment Recommended upon Discharge: Wheeled walker  OT Recommended Transfer Status: Stand by assist, Assist of 1  OT - OK to Discharge: Yes (Based on completed evaluation and care plan recommendations, no barriers to discharge to next site of  care)    Subjective     OT Visit Info:  OT Received On: 05/22/25  General:  General  Reason for Referral: Pt is a 60 yo F referred to occupational therapy for impaired self-care and functional mobility 2/2 hospitalization for L TKA  Referred By: VALERIE Nichols  Past Medical History Relevant to Rehab: L TKA 5/19  Family/Caregiver Present: No  Prior to Session Communication: Bedside nurse  Patient Position Received: Bed, 2 rail up, Alarm on  Preferred Learning Style: auditory, verbal  General Comment: Pt in bed upon arrivial. Pt pleasant and agreeable to therapy session. Pt verbalized taht knee pain has decreased since taking pain meds. Pt cleared by RN prior to session.  Precautions:  LE Weight Bearing Status: Weight Bearing as Tolerated  Medical Precautions: Fall precautions  Post-Surgical Precautions: Left total knee precautions  Precautions Comment: silver lining dressing over incision, tele, masimo     Date/Time Vitals Session Patient Position Pulse Resp SpO2 BP MAP (mmHg)    05/22/25 1200 --  --  66  --  95 %  --  --     05/22/25 1307 --  --  79  --  91 %  --  --           Pain Assessment:  Pain Assessment  Pain Assessment: 0-10  0-10 (Numeric) Pain Score: 0 - No pain  Pain Location: Knee  Pain Orientation: Left  Pain Interventions: Repositioned, Ambulation/increased activity  Response to Interventions: Content/relaxed    Objective   Cognition:  Overall Cognitive Status: Within Functional Limits  Arousal/Alertness: Appropriate responses to stimuli  Orientation Level: Oriented X4  Activities of Daily Living:    Grooming  Grooming Level of Assistance: Contact guard  Grooming Where Assessed: Standing sinkside  Grooming Comments: Washing hands at sink w/ CGA and FWW    LE Dressing  LE Dressing: Yes  Adult Briefs Level of Assistance: Contact guard  LE Dressing Where Assessed: Toilet  LE Dressing Comments: Pt able to manage brief up/down standing at toilet. I small LOB when donning brief w/ min A to  correct    Toileting  Toileting Level of Assistance: Contact guard  Where Assessed: Toilet  Toileting Comments: CGA for perineal hygiene, 1 small LOB when completing rear perineal hygiene    Functional Standing Tolerance:  Functional Standing Tolerance  Time: Approximately 3 minutes  Activity: Standing balance activity  Functional Standing Tolerance Comments: Good balance throughout which reaching outside MICHAEL  Bed Mobility/Transfers:   Bed Mobility  Bed Mobility: Yes  Bed Mobility 1  Bed Mobility 1: Supine to sitting  Level of Assistance 1: Contact guard  Bed Mobility 2  Bed Mobility  2: Sitting to supine  Level of Assistance 2: Minimum assistance  Bed Mobility Comments 2: bring BLEs fully into bed    Transfers  Transfer: Yes  Transfer 1  Transfer From 1: Bed to  Transfer to 1: Stand  Technique 1: Sit to stand, Stand to sit  Transfer Device 1: Walker, Gait belt  Transfer Level of Assistance 1: Contact guard  Transfers 2  Transfer From 2: Toilet to  Transfer to 2: Stand  Technique 2: Sit to stand, Stand to sit  Transfer Device 2: Walker, Gait belt  Transfer Level of Assistance 2: Contact guard    Functional Mobility:  Functional Mobility  Functional Mobility Performed: Yes  Functional Mobility 1  Surface 1: Level tile  Device 1: Rolling walker  Functional Mobility Support Devices: Gait belt  Assistance 1: Contact guard  Comments 1: Short functional mobility in room and hallway w/ FWW and CGA  Sitting Balance:  Static Sitting Balance  Static Sitting-Balance Support: Feet supported  Static Sitting-Level of Assistance: Distant supervision  Dynamic Sitting Balance  Dynamic Sitting-Balance Support: Feet supported  Dynamic Sitting-Level of Assistance: Distant supervision  Dynamic Sitting-Balance: Forward lean, Reaching for objects  Standing Balance:  Static Standing Balance  Static Standing-Balance Support: Bilateral upper extremity supported  Static Standing-Level of Assistance: Close supervision  Dynamic Standing  Balance  Dynamic Standing-Balance Support: Bilateral upper extremity supported  Dynamic Standing-Level of Assistance: Contact guard  Dynamic Standing-Balance: Turning, Reaching for objects    Therapy/Activity:    Balance/Neuromuscular Re-Education  Balance/Neuromuscular Re-Education Activity Performed: Yes  Balance/Neuromuscular Re-Education Activity 1: Dynamic standing balance - reaching outside MICHAEL using R hand x15  Balance/Neuromuscular Re-Education Activity 2: Dynamic standing balance - reaching outside MICHAEL using L hand x15    Strength:  RUE: Within Functional Limits   LUE: Within Functional Limits    Outcome Measures:  [facility] Daily Activity  Putting on and taking off regular lower body clothing: A little  Bathing (including washing, rinsing, drying): A little  Putting on and taking off regular upper body clothing: None  Toileting, which includes using toilet, bedpan or urinal: A little  Taking care of personal grooming such as brushing teeth: None  Eating Meals: None  Daily Activity - Total Score: 21    Education Documentation  Precautions, taught by Sylvia Cano OT at 5/22/2025  1:42 PM.  Learner: Patient  Readiness: Acceptance  Method: Explanation  Response: Verbalizes Understanding  Comment: Pt educated on POC, DC recs, safety and body mechanics when completing transfers and ADLs    ADL Training, taught by Sylvia Cano OT at 5/22/2025  1:42 PM.  Learner: Patient  Readiness: Acceptance  Method: Explanation  Response: Verbalizes Understanding  Comment: Pt educated on POC, DC recs, safety and body mechanics when completing transfers and ADLs    Goals:  Encounter Problems       Encounter Problems (Active)       ADLs       Patient with complete lower body dressing with stand by assist level of assistance donning and doffing all LE clothes  with PRN adaptive equipment while supported sitting (Progressing)       Start:  05/21/25    Expected End:  06/04/25            Patient will complete toileting including  hygiene clothing management/hygiene with modified independent level of assistance bedside commode  (Progressing)       Start:  05/21/25    Expected End:  06/04/25       (pt preference BSC over toilet d/t comfort)            BALANCE       Pt will maintain dynamic standing balance during ADL task with stand by assist level of assistance in order to demonstrate decreased risk of falling and improved postural control. (Progressing)       Start:  05/21/25    Expected End:  06/04/25       Dynamic balance during ADLs- grooming at sink, toileting with pericare/clothing management            COGNITION/SAFETY       Patient will recall and adhere to weight bearing and /or ROM restrictions with all ADL and functional mobility in order to promote healing and safety with functional tasks (Progressing)       Start:  05/21/25    Expected End:  06/04/25               MOBILITY       Patient will perform Functional mobility mod  Household distances/Community Distances with stand by assist level of assistance and least restrictive device in order to improve safety and functional mobility. (Progressing)       Start:  05/21/25    Expected End:  06/04/25               TRANSFERS       Patient will complete sit to stand transfer with modified independent level of assistance and least restrictive device in order to improve safety and prepare for out of bed mobility. (Progressing)       Start:  05/21/25    Expected End:  06/04/25

## 2025-05-22 NOTE — PROGRESS NOTES
Physical Therapy    Physical Therapy Treatment    Patient Name: Nataliia Pandya  MRN: [id number]  Department: [facility]  Room: [address] [place] [address]  Today's Date: 5/22/2025  Time Calculation  Start Time: 0830  Stop Time: 0917  Time Calculation (min): 47 min         Assessment/Plan   PT Assessment  PT Assessment Results: Decreased strength, Impaired balance, Decreased mobility, Decreased range of motion, Decreased skin integrity, Pain, Orthopedic restrictions  Rehab Prognosis: Good  Barriers to Discharge Home: Caregiver assistance, Physical needs  Caregiver Assistance: Caregiver assistance needed per identified barriers - however, level of patient's required assistance exceeds assistance available at home  Physical Needs: Ambulating household distances limited by function/safety, 24hr mobility assistance needed  Evaluation/Treatment Tolerance: Patient tolerated treatment well  Medical Staff Made Aware: Yes  Strengths: Ability to acquire knowledge, Coping skills, Insight into problems, Rehab experience  Barriers to Participation: Comorbidities, Housing layout  End of Session Communication: Bedside nurse  Assessment Comment: Pt demonstrated improved knee flexion this date post stretching and over pressure within pain free limits to 80 degrees and knee ext to 8 degrees and improved gait mechanics to CGA with improved mechanics post cues.  Pt cont to hvae pain limiting quad control contribuitng to weakness and difficulty with transfers. Pt cont to require skilled PT to improve strenght, ROM and functional mobility to reduce risk for falls and prevent further decline here in hospital. Pt left up in chair, alarm on., call bell within reach and all needs addressed. ICEMAN applied.  End of Session Patient Position: Up in chair, Alarm on     PT Plan  Treatment/Interventions: Transfer training, Gait training, Strengthening, Endurance training, Range of motion, Therapeutic exercise, Therapeutic activity  PT Plan: Ongoing PT  PT Frequency: 5  times per week  PT Discharge Recommendations: Moderate intensity level of continued care  Equipment Recommended upon Discharge: Wheeled walker  PT Recommended Transfer Status: Assist x1  PT - OK to Discharge: Yes    PT Visit Info:  PT Received On: 05/22/25  Response to Previous Treatment: Patient with no complaints from previous session.     General Visit Information:   General  Reason for Referral: Impaired functional mobility; L TKA  Referred By: VALERIE Nichols  Past Medical History Relevant to Rehab: L TKA 5/19  Prior to Session Communication: Bedside nurse  Patient Position Received: Up in chair, Alarm on  Preferred Learning Style: auditory, verbal  General Comment: Pt in chair with legs elevated, agreeable to therapy and reports pain in L knee 5/10.  Pt cleared by nursing prior to session.    Subjective knee hurts but I need to move.   Precautions:  Precautions  LE Weight Bearing Status: Weight Bearing as Tolerated  Medical Precautions: Fall precautions  Post-Surgical Precautions: Left total knee precautions  Precautions Comment: silver lining dressing over incision     Date/Time Vitals Session Patient Position Pulse Resp SpO2 BP MAP (mmHg)    05/22/25 1000 --  --  86  --  94 %  --  --     05/22/25 1010 --  --  89  17  94 %  119/67  84                 Objective   Pain:  Pain Assessment  Pain Assessment: 0-10  0-10 (Numeric) Pain Score: 5 - Moderate pain  Pain Type: Surgical pain, Acute pain  Pain Location: Knee  Pain Orientation: Left  Patient's Stated Pain Goal: No pain  Pain Interventions: Cold applied, Elevated  Response to Interventions: Decrease in pain  Cognition:  Cognition  Overall Cognitive Status: Within Functional Limits  Orientation Level: Oriented X4  Coordination:  Movements are Fluid and Coordinated: Yes    Activity Tolerance:  Activity Tolerance  Endurance: Tolerates 10 - 20 min exercise with multiple rests  Treatments:  Therapeutic Exercise  Therapeutic Exercise Performed:  Yes  Therapeutic Exercise Activity 1: 2x20 PF/DF  Therapeutic Exercise Activity 2: 20x long sit QS  Therapeutic Exercise Activity 3: 3x10 AAROM knee flexion with ball  Therapeutic Exercise Activity 4: 2x10 LAQ  Therapeutic Exercise Activity 5: 2x10 hip flex         Ambulation/Gait Training  Ambulation/Gait Training Performed: Yes  Ambulation/Gait Training 1  Surface 1: Level tile  Device 1: Rolling walker  Gait Support Devices: Gait belt  Assistance 1: Contact guard  Quality of Gait 1: Inconsistent stride length, Decreased step length, Forward flexed posture, Shuffling gait  Comments/Distance (ft) 1: 50-60 ft gait with FWW CGA with verbal and visual cues for increased hip and knee flexion swing phase as well as increased heel striking and foot flat gait mechanics.    2 trials for this distance  Ambulation/Gait Training 2  Surface 2: Level tile  Device 2: Rolling walker  Gait Support Devices: Gait belt  Assistance 2: Contact guard  Quality of Gait 2: Inconsistent stride length, Decreased step length, Shuffling gait  Comments/Distance (ft) 2: 8 ft gait within room into bathroom with FWW CGA with cues for safer MICHAEL when making turns.  Transfers  Transfer: Yes  Transfer 1  Transfer From 1: Chair with arms to  Transfer to 1: Toilet, Stand  Technique 1: Sit to stand, Stand to sit, Stand pivot  Transfer Device 1: Walker, Gait belt  Transfer Level of Assistance 1: Contact guard, Close supervision  Trials/Comments 1: L LE kick out noted during stand to sit and cues for use of grab bar  Transfers 2  Transfer From 2: Toilet to  Transfer to 2: Stand, Chair with arms  Technique 2: Sit to stand, Stand to sit  Transfer Device 2: Walker, Gait belt  Transfer Level of Assistance 2: Contact guard         Outcome Measures:  [facility] Basic Mobility  Turning from your back to your side while in a flat bed without using bedrails: A little  Moving from lying on your back to sitting on the side of a flat bed without using bedrails: A  little  Moving to and from bed to chair (including a wheelchair): A little  Standing up from a chair using your arms (e.g. wheelchair or bedside chair): A little  To walk in hospital room: A little  Climbing 3-5 steps with railing: A lot  Basic Mobility - Total Score: 17    Education Documentation  Precautions, taught by Amber Titus PTA at 5/22/2025 11:42 AM.  Learner: Patient  Readiness: Acceptance  Method: Explanation  Response: Verbalizes Understanding  Comment: Education and cues for improved posture and gait mechanics as well as increased knee motion throughouth the day to improve kneeROM    Mobility Training, taught by Amber Titus PTA at 5/22/2025 11:42 AM.  Learner: Patient  Readiness: Acceptance  Method: Explanation  Response: Verbalizes Understanding  Comment: Education and cues for improved posture and gait mechanics as well as increased knee motion throughouth the day to improve kneeROM    Education Comments  No comments found.        Encounter Problems       Encounter Problems (Active)       Balance       STG - Maintains dynamic standing balance with 1 upper extremity support with >=good- balance  (Progressing)       Start:  05/21/25    Expected End:  06/04/25               Mobility       LTG - Patient will ambulate community distance ELDON with WW (Progressing)       Start:  05/21/25    Expected End:  06/04/25               PT Transfers       STG - Patient will perform bed mobility IND (Progressing)       Start:  05/21/25    Expected End:  06/04/25            STG - Patient will transfer sit to and from stand ELDON with WW (Progressing)       Start:  05/21/25    Expected End:  06/04/25               Pain - Adult          Safety       Pt. will be IND with HEP  (Progressing)       Start:  05/21/25    Expected End:  06/04/25

## 2025-05-23 VITALS
HEART RATE: 78 BPM | SYSTOLIC BLOOD PRESSURE: 104 MMHG | RESPIRATION RATE: 18 BRPM | OXYGEN SATURATION: 94 % | BODY MASS INDEX: 42.42 KG/M2 | WEIGHT: 216.05 LBS | HEIGHT: 60 IN | TEMPERATURE: 97.7 F | DIASTOLIC BLOOD PRESSURE: 54 MMHG

## 2025-05-23 LAB
ANION GAP SERPL CALC-SCNC: 13 MMOL/L (ref 10–20)
BUN SERPL-MCNC: 16 MG/DL (ref 6–23)
CALCIUM SERPL-MCNC: 8.6 MG/DL (ref 8.6–10.3)
CHLORIDE SERPL-SCNC: 99 MMOL/L (ref 98–107)
CO2 SERPL-SCNC: 27 MMOL/L (ref 21–32)
CREAT SERPL-MCNC: 0.57 MG/DL (ref 0.5–1.05)
EGFRCR SERPLBLD CKD-EPI 2021: >90 ML/MIN/1.73M*2
ERYTHROCYTE [DISTWIDTH] IN BLOOD BY AUTOMATED COUNT: 13.3 % (ref 11.5–14.5)
GLUCOSE SERPL-MCNC: 110 MG/DL (ref 74–99)
HCT VFR BLD AUTO: 33.6 % (ref 36–46)
HGB BLD-MCNC: 10.9 G/DL (ref 12–16)
MCH RBC QN AUTO: 32 PG (ref 26–34)
MCHC RBC AUTO-ENTMCNC: 32.4 G/DL (ref 32–36)
MCV RBC AUTO: 99 FL (ref 80–100)
NRBC BLD-RTO: 0 /100 WBCS (ref 0–0)
PLATELET # BLD AUTO: 160 X10*3/UL (ref 150–450)
POTASSIUM SERPL-SCNC: 3.5 MMOL/L (ref 3.5–5.3)
RBC # BLD AUTO: 3.41 X10*6/UL (ref 4–5.2)
SODIUM SERPL-SCNC: 135 MMOL/L (ref 136–145)
WBC # BLD AUTO: 7.4 X10*3/UL (ref 4.4–11.3)

## 2025-05-23 PROCEDURE — G0378 HOSPITAL OBSERVATION PER HR: HCPCS

## 2025-05-23 PROCEDURE — 97110 THERAPEUTIC EXERCISES: CPT | Mod: GP | Performed by: PHYSICAL THERAPIST

## 2025-05-23 PROCEDURE — 85027 COMPLETE CBC AUTOMATED: CPT | Performed by: NURSE PRACTITIONER

## 2025-05-23 PROCEDURE — 94640 AIRWAY INHALATION TREATMENT: CPT | Mod: 76

## 2025-05-23 PROCEDURE — S4991 NICOTINE PATCH NONLEGEND: HCPCS | Mod: SE | Performed by: NURSE PRACTITIONER

## 2025-05-23 PROCEDURE — 36415 COLL VENOUS BLD VENIPUNCTURE: CPT | Performed by: NURSE PRACTITIONER

## 2025-05-23 PROCEDURE — 99239 HOSP IP/OBS DSCHRG MGMT >30: CPT | Performed by: NURSE PRACTITIONER

## 2025-05-23 PROCEDURE — 94760 N-INVAS EAR/PLS OXIMETRY 1: CPT

## 2025-05-23 PROCEDURE — 82374 ASSAY BLOOD CARBON DIOXIDE: CPT | Performed by: NURSE PRACTITIONER

## 2025-05-23 PROCEDURE — 2500000002 HC RX 250 W HCPCS SELF ADMINISTERED DRUGS (ALT 637 FOR MEDICARE OP, ALT 636 FOR OP/ED): Mod: SE | Performed by: NURSE PRACTITIONER

## 2025-05-23 PROCEDURE — 2500000004 HC RX 250 GENERAL PHARMACY W/ HCPCS (ALT 636 FOR OP/ED): Mod: SE | Performed by: NURSE PRACTITIONER

## 2025-05-23 PROCEDURE — 97116 GAIT TRAINING THERAPY: CPT | Mod: GP | Performed by: PHYSICAL THERAPIST

## 2025-05-23 PROCEDURE — 2500000001 HC RX 250 WO HCPCS SELF ADMINISTERED DRUGS (ALT 637 FOR MEDICARE OP): Mod: SE | Performed by: NURSE PRACTITIONER

## 2025-05-23 RX ADMIN — CEPHALEXIN 500 MG: 250 CAPSULE ORAL at 14:50

## 2025-05-23 RX ADMIN — PANTOPRAZOLE SODIUM 40 MG: 40 TABLET, DELAYED RELEASE ORAL at 06:00

## 2025-05-23 RX ADMIN — BUDESONIDE 0.25 MG: 0.25 INHALANT RESPIRATORY (INHALATION) at 10:22

## 2025-05-23 RX ADMIN — NICOTINE 1 PATCH: 14 PATCH, EXTENDED RELEASE TRANSDERMAL at 08:18

## 2025-05-23 RX ADMIN — ASPIRIN 81 MG CHEWABLE TABLET 81 MG: 81 TABLET CHEWABLE at 08:17

## 2025-05-23 RX ADMIN — TAMSULOSIN HYDROCHLORIDE 0.4 MG: 0.4 CAPSULE ORAL at 08:17

## 2025-05-23 RX ADMIN — OXYCODONE 10 MG: 5 TABLET ORAL at 05:54

## 2025-05-23 RX ADMIN — OXYCODONE 10 MG: 5 TABLET ORAL at 10:39

## 2025-05-23 RX ADMIN — ATENOLOL 25 MG: 25 TABLET ORAL at 08:17

## 2025-05-23 RX ADMIN — FORMOTEROL FUMARATE DIHYDRATE 20 MCG: 20 SOLUTION RESPIRATORY (INHALATION) at 10:23

## 2025-05-23 RX ADMIN — Medication 1 TABLET: at 08:17

## 2025-05-23 RX ADMIN — DICYCLOMINE HYDROCHLORIDE 20 MG: 10 CAPSULE ORAL at 13:28

## 2025-05-23 RX ADMIN — GABAPENTIN 300 MG: 300 CAPSULE ORAL at 14:50

## 2025-05-23 RX ADMIN — HYDROCHLOROTHIAZIDE 25 MG: 25 TABLET ORAL at 08:17

## 2025-05-23 RX ADMIN — POLYETHYLENE GLYCOL 3350 17 G: 17 POWDER, FOR SOLUTION ORAL at 08:16

## 2025-05-23 RX ADMIN — LISINOPRIL 10 MG: 10 TABLET ORAL at 08:17

## 2025-05-23 RX ADMIN — DOXYCYCLINE HYCLATE 100 MG: 100 TABLET, COATED ORAL at 08:18

## 2025-05-23 RX ADMIN — GABAPENTIN 300 MG: 300 CAPSULE ORAL at 08:17

## 2025-05-23 RX ADMIN — CEPHALEXIN 500 MG: 250 CAPSULE ORAL at 05:54

## 2025-05-23 RX ADMIN — DICYCLOMINE HYDROCHLORIDE 20 MG: 10 CAPSULE ORAL at 08:17

## 2025-05-23 RX ADMIN — OXYCODONE 5 MG: 5 TABLET ORAL at 14:51

## 2025-05-23 ASSESSMENT — COGNITIVE AND FUNCTIONAL STATUS - GENERAL
DRESSING REGULAR UPPER BODY CLOTHING: A LITTLE
TURNING FROM BACK TO SIDE WHILE IN FLAT BAD: A LITTLE
STANDING UP FROM CHAIR USING ARMS: A LITTLE
MOVING TO AND FROM BED TO CHAIR: A LITTLE
CLIMB 3 TO 5 STEPS WITH RAILING: A LOT
PERSONAL GROOMING: A LITTLE
WALKING IN HOSPITAL ROOM: A LITTLE
WALKING IN HOSPITAL ROOM: A LITTLE
STANDING UP FROM CHAIR USING ARMS: A LITTLE
TURNING FROM BACK TO SIDE WHILE IN FLAT BAD: A LITTLE
MOBILITY SCORE: 18
MOVING FROM LYING ON BACK TO SITTING ON SIDE OF FLAT BED WITH BEDRAILS: A LITTLE
DAILY ACTIVITIY SCORE: 19
DRESSING REGULAR LOWER BODY CLOTHING: A LITTLE
MOBILITY SCORE: 18
CLIMB 3 TO 5 STEPS WITH RAILING: A LITTLE
TOILETING: A LITTLE
MOVING TO AND FROM BED TO CHAIR: A LITTLE
HELP NEEDED FOR BATHING: A LITTLE

## 2025-05-23 ASSESSMENT — PAIN SCALES - GENERAL
PAINLEVEL_OUTOF10: 0 - NO PAIN
PAINLEVEL_OUTOF10: 7
PAINLEVEL_OUTOF10: 7
PAINLEVEL_OUTOF10: 0 - NO PAIN
PAINLEVEL_OUTOF10: 5 - MODERATE PAIN
PAINLEVEL_OUTOF10: 0 - NO PAIN
PAINLEVEL_OUTOF10: 0 - NO PAIN

## 2025-05-23 ASSESSMENT — PAIN DESCRIPTION - LOCATION
LOCATION: KNEE

## 2025-05-23 ASSESSMENT — PAIN - FUNCTIONAL ASSESSMENT
PAIN_FUNCTIONAL_ASSESSMENT: 0-10
PAIN_FUNCTIONAL_ASSESSMENT: WONG-BAKER FACES

## 2025-05-23 ASSESSMENT — PAIN SCALES - WONG BAKER: WONGBAKER_NUMERICALRESPONSE: NO HURT

## 2025-05-23 ASSESSMENT — PAIN DESCRIPTION - DESCRIPTORS: DESCRIPTORS: ACHING;SORE

## 2025-05-23 ASSESSMENT — PAIN DESCRIPTION - ORIENTATION
ORIENTATION: LEFT
ORIENTATION: LEFT

## 2025-05-23 NOTE — CARE PLAN
The clinical goals for the shift include Patient will have controlled pain this shift and be up to recliner for at least one meal.    Over the shift, the patient did report controlled pain with the help of PRN medications. Vitals remained stable this shift, appetite was good. Patient was up to the bathroom with staff assist this shift and went to the spa for a shower. Report was called to Amina Garcia at [facility] at 14:30. IV removed and last set of vitals obtained. Patient discharged to [facility] at 1545, [facility]

## 2025-05-23 NOTE — PROGRESS NOTES
Physical Therapy    Physical Therapy Treatment    Patient Name: Nataliia Pandya  MRN: [id number]  Department: [facility]  Room: [address] [place] [address]  Today's Date: 5/23/2025   Time Calculation  Start Time: 0940  Stop Time: 1030  Time Calculation (min): 50 min    Assessment/Plan   PT Assessment  PT Assessment Results: Decreased strength, Decreased range of motion, Impaired balance, Decreased mobility  Rehab Prognosis: Good  Barriers to Discharge Home: Physical needs, Caregiver assistance  Caregiver Assistance: Caregiver assistance needed per identified barriers - however, level of patient's required assistance exceeds assistance available at home  Physical Needs: 24hr mobility assistance needed  Evaluation/Treatment Tolerance: Patient tolerated treatment well  Medical Staff Made Aware: Yes  Strengths: Ability to acquire knowledge  Barriers to Participation: Comorbidities  End of Session Communication: Bedside nurse  Assessment Comment: pt. is making progress towards goals and was able to tolerate steps today with CGA and VC's. ROM -8 degrees to 82 degrees  End of Session Patient Position: Bed, 2 rail up, Alarm on   IP OR SWING BED PT PLAN  Inpatient or Swing Bed: Inpatient  PT Plan  Treatment/Interventions: Transfer training, Gait training, Strengthening, Endurance training, Range of motion, Therapeutic exercise, Therapeutic activity  PT Plan: Ongoing PT  PT Frequency: 5 times per week  PT Discharge Recommendations: Moderate intensity level of continued care  Equipment Recommended upon Discharge: Wheeled walker  PT Recommended Transfer Status: Assist x1  PT - OK to Discharge: Yes      Subjective     PT Visit Info:  PT Received On: 05/23/25  General Visit Information:  General  Prior to Session Communication: Bedside nurse  Patient Position Received: Bed, 2 rail up, Alarm on  Home Living:     Prior Level of Function:     Precautions:  Precautions  Medical Precautions: Fall precautions     Date/Time Vitals Session Patient Position Pulse  Resp SpO2 BP MAP (mmHg)    05/23/25 0940 --  --  85  --  91 %  --  --              Objective   Pain:  Pain Assessment  Pain Assessment: 0-10  0-10 (Numeric) Pain Score: 0 - No pain (5/10 during session)  Pain Interventions: Repositioned, Ambulation/increased activity (RN notified (requesting pain meds))  Cognition:  Cognition  Overall Cognitive Status: Within Functional Limits    General Assessments:     Activity Tolerance  Endurance: Endurance does not limit participation in activity       Coordination  Movements are Fluid and Coordinated: Yes    Functional Assessments:  Bed Mobility 2  Bed Mobility  2: Supine to sitting, Sitting to supine  Level of Assistance 2: Close supervision    Transfer 1  Technique 1: Stand to sit, Sit to stand  Transfer Device 1: Walker  Transfer Level of Assistance 1: Contact guard, Moderate verbal cues    Ambulation/Gait Training 2  Device 2: Rolling walker  Quality of Gait 2: Inconsistent stride length, Decreased step length  Comments/Distance (ft) 2: 100' x 2 (cues to keep walker on ground with turns)    Stairs  Stairs: Yes  Stairs  Device 1: Railing  Assistance 1: Contact guard  Comment/Number of Steps 1: 5 x (5x tapping on stool)  Extremity/Trunk Assessments:   -8 degrees to 82 degrees     Treatments:  Therapeutic Exercise  Therapeutic Exercise Performed: Yes  Therapeutic Exercise Activity 1: B ankle pumps 10x2, heel slides L 10x2, quad sets L 10x2, L [id number], L LAQ 10x2, L knee flex in sitting 10x2, hip abduction 10x2, yellow TB hip abduction in sitting L 10x, R 10x, B 10x,    Ambulation/Gait Training 2  Device 2: Rolling walker  Quality of Gait 2: Inconsistent stride length, Decreased step length  Comments/Distance (ft) 2: 100' x 2 (cues to keep walker on ground with turns)  Stairs  Stairs: Yes  Stairs  Device 1: Railing  Assistance 1: Contact guard  Comment/Number of Steps 1: 5 x (5x tapping on stool)  Outcome Measures:  [facility] Basic Mobility  Turning from your back to your side  while in a flat bed without using bedrails: A little  Moving from lying on your back to sitting on the side of a flat bed without using bedrails: A little  Moving to and from bed to chair (including a wheelchair): A little  Standing up from a chair using your arms (e.g. wheelchair or bedside chair): A little  To walk in hospital room: A little  Climbing 3-5 steps with railing: A little  Basic Mobility - Total Score: 18    Other Measures  5x Sit to Stand: 24 seconds    Encounter Problems       Encounter Problems (Active)       Balance       STG - Maintains dynamic standing balance with 1 upper extremity support with >=good- balance  (Progressing)       Start:  05/21/25    Expected End:  06/04/25               Mobility       LTG - Patient will ambulate community distance ELDON with WW (Progressing)       Start:  05/21/25    Expected End:  06/04/25               PT Transfers       STG - Patient will perform bed mobility IND (Progressing)       Start:  05/21/25    Expected End:  06/04/25            STG - Patient will transfer sit to and from stand ELDON with WW (Progressing)       Start:  05/21/25    Expected End:  06/04/25               Pain - Adult          Safety       Pt. will be IND with HEP  (Progressing)       Start:  05/21/25    Expected End:  06/04/25                   Education Documentation  Home Exercise Program, taught by Mallory Soares, PT at 5/23/2025 11:02 AM.  Learner: Patient  Readiness: Acceptance  Method: Explanation  Response: Needs Reinforcement  Comment: Reviewed HEP    Education Comments  No comments found.

## 2025-05-23 NOTE — PROGRESS NOTES
05/23/25 0959   Discharge Planning   Living Arrangements Spouse/significant other   Support Systems Spouse/significant other;Children   Assistance Needed Patient alert and oriented: She lives with her  in a 2 story house with 2 dogs-they don't utilized the upstairs. Patient is s/p left knee replacement and was sent home 5/19 with [facility]. The polar ice wrap is being used on her knee. She is having trouble ambulating-DME: Rollator, walker, walk in shower, CPAP and 4L oxygen at home. She uses [employer] for transportation - her  and she doesn't drive; they use [employer] She is interested in SNF.   Type of Residence Private residence   Home or Post Acute Services Post acute facilities (Rehab/SNF/etc)   Type of Post Acute Facility Services Skilled nursing   Expected Discharge Disposition SNF  (Precert auth received-good 5/23-6/2 for [facility])   Does the patient need discharge transport arranged? Yes   RoundTrip coordination needed? Yes   Has discharge transport been arranged? Yes   What day is the transport expected? 05/23/25   Patient Choice   Provider Choice list and CMS website (https://medicare.gov/care-compare#search) for post-acute Quality and Resource Measure Data were provided and reviewed with: Family   Stroke Family Assessment   Stroke Family Assessment Needed No   Intensity of Service   Intensity of Service 0-30 min     10:45 AM  DSC setting up transportation and sending final orders for patient to discharge to [facility]-updated patient.

## 2025-05-23 NOTE — CARE PLAN
Problem: Skin  Goal: Participates in plan/prevention/treatment measures  Outcome: Progressing  Flowsheets (Taken 5/23/2025 0648)  Participates in plan/prevention/treatment measures:   Elevate heels   Discuss with provider PT/OT consult     Problem: Fall/Injury  Goal: Verbalize understanding of personal risk factors for fall in the hospital  Outcome: Progressing  Goal: Verbalize understanding of risk factor reduction measures to prevent injury from fall in the home  Outcome: Progressing     Problem: Pain  Goal: Takes deep breaths with improved pain control throughout the shift  Outcome: Progressing     Problem: Pain - Adult  Goal: Verbalizes/displays adequate comfort level or baseline comfort level  Outcome: Progressing     Problem: Skin  Goal: Prevent/manage excess moisture  Outcome: Met  Flowsheets (Taken 5/23/2025 0648)  Prevent/manage excess moisture: Moisturize dry skin     Problem: Fall/Injury  Goal: Not fall by end of shift  Outcome: Met  Goal: Be free from injury by end of the shift  Outcome: Met     Problem: Safety - Adult  Goal: Free from fall injury  Outcome: Met     Problem: Skin  Goal: Prevent/minimize sheer/friction injuries  Flowsheets (Taken 5/23/2025 0648)  Prevent/minimize sheer/friction injuries:   Use pull sheet   Turn/reposition every 2 hours/use positioning/transfer devices   The patient's goals for the shift include      The clinical goals for the shift include Pt will have pain controlled this shift.    Over the shift, the patient did  make progress toward the following goals.

## 2025-05-23 NOTE — DISCHARGE SUMMARY
Discharge Diagnosis  Unable to ambulate     Discharge Meds     Medication List      CHANGE how you take these medications     oxyCODONE-acetaminophen 5-325 mg tablet; Commonly known as: Percocet;   Take 1 tablet by mouth every 6 hours if needed for moderate pain (4 - 6)   or severe pain (7 - 10) for up to 10 doses.; What changed: when to take   this, reasons to take this     CONTINUE taking these medications     albuterol 90 mcg/actuation aerosol powdr breath activated inhaler   alendronate 70 mg tablet; Commonly known as: Fosamax   armodafinil 150 mg tablet; Commonly known as: Nuvigil   aspirin 81 mg chewable tablet; Chew 1 tablet (81 mg) 2 times a day for   28 days.   atenolol 25 mg tablet; Commonly known as: Tenormin   atorvastatin 20 mg tablet; Commonly known as: Lipitor   cefadroxil 500 mg capsule; Commonly known as: Duricef; Take 1 capsule   (500 mg) by mouth 2 times a day for 7 days.   dicyclomine 20 mg tablet; Commonly known as: Bentyl   docusate sodium 100 mg capsule; Commonly known as: Colace; Take 1   capsule (100 mg) by mouth 2 times a day for 10 days.   doxycycline 100 mg capsule; Commonly known as: Monodox   Dulera 50-5 mcg/actuation HFA aerosol inhaler inhaler; Generic drug:   mometasone-formoterol   ergocalciferol 1250 mcg (50,000 units) capsule; Commonly known as:   Vitamin D-2   estradiol 0.01 % (0.1 mg/gram) vaginal cream; Commonly known as:   Estrace; Insert pea size amount into vagina every night for 2 weeks, then   2x/week after   famotidine 20 mg tablet; Commonly known as: Pepcid   fluticasone 50 mcg/actuation nasal spray; Commonly known as: Flonase   gabapentin 300 mg capsule; Commonly known as: Neurontin   hydroCHLOROthiazide 25 mg tablet; Commonly known as: HYDRODiuril   lisinopril 10 mg tablet   omeprazole 20 mg DR capsule; Commonly known as: PriLOSEC   ondansetron ODT 4 mg disintegrating tablet; Commonly known as:   Zofran-ODT; Take 1 tablet (4 mg) by mouth every 8 hours if needed for  "  nausea or vomiting.   Oyster Shell Calcium-Vit D3 500 mg-5 mcg (200 unit) tablet; Generic   drug: calcium carbonate-vitamin D3   Plenvu 140-9-5.2 gram powder; Generic drug: [id number]   sul-NaCl-KCl-asb-C   polyethylene glycol 17 gram/dose powder; Commonly known as: Glycolax,   Miralax; Mix 17 g of powder and drink once daily.   tamsulosin 0.4 mg 24 hr capsule; Commonly known as: Flomax   triamcinolone 0.1 % cream; Commonly known as: Kenalog     ASK your doctor about these medications     ibuprofen 400 mg tablet   methenamine hippurate 1 gram tablet; Commonly known as: Hiprex; Take 1   tablet (1 g) by mouth 2 times a day.   naproxen 500 mg tablet; Commonly known as: Naprosyn; Take 1 tablet (500   mg) by mouth 2 times daily (morning and late afternoon).   phenazopyridine 200 mg tablet; Commonly known as: Pyridium       Test Results Pending At Discharge  Pending Labs       No current pending labs.            Hospital Course   HPI: \"Nataliia Pandya is a 59 y.o. female with past medical history of hypertension, hyperlipidemia, diverticulitis, lupus, CHRISTIAN with CPAP use, COPD who presented to the emergency department yesterday from home with complaints of severe pain and swelling in her left knee.  She had a left total knee replacement on May 19 at [facility] and was discharged home with therapy.  She states that she took an oxycodone at noon and it did not help her pain so she called EMS.  Her  is disabled and unable to help her at home.  In the ED her vital signs were stable, urinalysis was bland and there were no significant lab derangements.  X-ray of the left knee showed expected postoperative appearance.  She was medicated for pain and subsequently admitted to Medicine for further treatment and evaluation and close monitoring of hemodynamic status with likely placement in rehab facility\"    She was admitted to Medicine and treated for inability to ambulate and intractable pain after left total knee " replacement with Dr. Miramontes.  She was treated with pain therapies, ice and rest.   Chronic medical conditions were also addressed and monitored. PT/OT evals were done. Labs were closely monitored.  She was discharged to SNF for further rehab in stable condition with the above medication changes and/or additions. Recommendations were made to follow up with pt's PCP in 1-2 weeks. See full inpatient plan below.     Problem list:  Unable to ambulate  S/P TKR (total knee replacement), left  Neuropathy  -TKR on 5/19/25 with Dr Miramontes, could not tolerate pain at home  -Left knee xray: Expected postoperative appearance   -pain meds as needed  -Continue gabapentin  -PT/OT  -TCC for DC planning  - Safety precautions  - Ice as needed  - Continue Keflex, doxycycline  --- Discharge on medications listed above, physical and Occupational Therapy recommended at SNF     CAD (coronary artery disease)  HTN (hypertension)  HLD (hyperlipidemia)  COPD (chronic obstructive pulmonary disease) (Multi)  Nicotine abuse  GERD (gastroesophageal reflux disease)  --- Continue chronic medical therapies, follow-up as needed with PCP    Pertinent Physical Exam At Time of Discharge  Physical Exam  Constitutional:       General: She is not in acute distress.     Appearance: Normal appearance. She is not toxic-appearing.   HENT:      Head: Normocephalic and atraumatic.      Mouth/Throat:      Mouth: Mucous membranes are moist.   Eyes:      Extraocular Movements: Extraocular movements intact.      Pupils: Pupils are equal, round, and reactive to light.   Cardiovascular:      Rate and Rhythm: Normal rate and regular rhythm.      Pulses: Normal pulses.      Heart sounds: Normal heart sounds. No murmur heard.     No gallop.   Pulmonary:      Effort: Pulmonary effort is normal. No respiratory distress.      Breath sounds: Normal breath sounds. No wheezing, rhonchi or rales.   Abdominal:      General: Bowel sounds are normal. There is no distension.       Palpations: Abdomen is soft.      Tenderness: There is no abdominal tenderness. There is no guarding or rebound.   Musculoskeletal:         General: Tenderness present. No swelling, deformity or signs of injury. Normal range of motion.      Cervical back: Normal range of motion and neck supple.      Left lower leg: Edema present.      Comments: Left knee   Skin:     General: Skin is warm and dry.      Capillary Refill: Capillary refill takes less than 2 seconds.      Coloration: Skin is not jaundiced.      Findings: Bruising present. No rash.   Neurological:      General: No focal deficit present.      Mental Status: She is alert and oriented to person, place, and time. Mental status is at baseline.      Cranial Nerves: No cranial nerve deficit.      Sensory: No sensory deficit.      Motor: No weakness.      Gait: Gait normal.   Psychiatric:         Mood and Affect: Mood normal.         Behavior: Behavior normal.         Thought Content: Thought content normal.         Judgment: Judgment normal.        Stable for discharge to SNF.  Total cumulative time spent in preparation of this discharge including documentation review, coordination of care with the medical team including PT/SW/care coordinators and treating consultants, discussion with patient and pertinent family members and finalization of prescriptions, follow-up appointments, and this discharge summary was approximately 45 minutes.    Outpatient Follow-Up  Future Appointments   Date Time Provider Department Center   6/3/2025  8:15 AM MIGUEL [id number] MARK Christianson-CNP

## 2025-06-03 ENCOUNTER — APPOINTMENT (OUTPATIENT)
Dept: ORTHOPEDIC SURGERY | Facility: CLINIC | Age: 60
End: 2025-06-03
Payer: COMMERCIAL

## 2025-06-05 LAB
LABORATORY COMMENT REPORT: NORMAL
PATH REPORT.FINAL DX SPEC: NORMAL
PATH REPORT.GROSS SPEC: NORMAL
PATH REPORT.RELEVANT HX SPEC: NORMAL
PATH REPORT.TOTAL CANCER: NORMAL

## 2025-06-06 ENCOUNTER — APPOINTMENT (OUTPATIENT)
Dept: ORTHOPEDIC SURGERY | Facility: CLINIC | Age: 60
End: 2025-06-06
Payer: COMMERCIAL

## 2025-06-06 ENCOUNTER — HOSPITAL ENCOUNTER (OUTPATIENT)
Dept: RADIOLOGY | Facility: HOSPITAL | Age: 60
Discharge: HOME | End: 2025-06-06
Payer: COMMERCIAL

## 2025-06-06 DIAGNOSIS — Z96.652 S/P TOTAL KNEE ARTHROPLASTY, LEFT: ICD-10-CM

## 2025-06-06 PROCEDURE — 99024 POSTOP FOLLOW-UP VISIT: CPT

## 2025-06-06 PROCEDURE — 73564 X-RAY EXAM KNEE 4 OR MORE: CPT | Mod: LEFT SIDE | Performed by: RADIOLOGY

## 2025-06-06 PROCEDURE — 73562 X-RAY EXAM OF KNEE 3: CPT | Mod: LEFT SIDE | Performed by: RADIOLOGY

## 2025-06-06 PROCEDURE — 73562 X-RAY EXAM OF KNEE 3: CPT | Mod: RT

## 2025-06-06 PROCEDURE — 73564 X-RAY EXAM KNEE 4 OR MORE: CPT | Mod: LT

## 2025-06-06 ASSESSMENT — PAIN - FUNCTIONAL ASSESSMENT: PAIN_FUNCTIONAL_ASSESSMENT: 0-10

## 2025-06-06 ASSESSMENT — PAIN SCALES - GENERAL: PAINLEVEL_OUTOF10: 5 - MODERATE PAIN

## 2025-06-06 NOTE — PROGRESS NOTES
Doing great will be getting discharged from snf soon, told her to call us if she needs home pt or meds when she gets home  Chief Complaint   Patient presents with    Left Knee - Post-op     5/19/25 LT TKA          This is a 59 y.o. female who is 2 weeks out from left total knee.  Pain is under control with current medications.  No drainage from her incision no fevers or chills.  Progressing well with physical therapy and appropriately improving in function.  Patient is doing great with her recovery so far and she is ready to get home and out of the skilled nursing facility.  No other new issues or symptoms.    Left knee examination: Surgical incision well approximated no erythema no drainage.  Stable to varus valgus stress range of motion 0 to 110 degrees extension flexion.  Neurovascular tact distally    X-rays of the knee were reviewed independently interpreted by me today, the show stable total knee arthroplasty no fracture dislocation or loosening    Impression plan: 59 y.o. female 2 weeks out from left total knee.  We discussed continuing physical therapy and home exercise program. Pain medications to be used as needed. Assistive devices as needed per PT. We discussed DVT prophylaxis until 6 weeks. No dentist until 3 months and thereafter dental prophylaxis for life. Activity as tolerated weightbearing as tolerated. I have personally reviewed the OARRS report for the patient. This report is scanned into the electronic medical record. I have considered the risks of abuse, dependence, addiction and diversion. Follow up 4 weeks with xrays.  Patient is doing great with recovery at this point and I told her when she gets home to give us a call if she needs any refills on any medications.  I also told her I can place an order for outpatient physical therapy.

## 2025-06-07 ENCOUNTER — HOME HEALTH ADMISSION (OUTPATIENT)
Dept: HOME HEALTH SERVICES | Facility: HOME HEALTH | Age: 60
End: 2025-06-07
Payer: COMMERCIAL

## 2025-06-08 ENCOUNTER — DOCUMENTATION (OUTPATIENT)
Dept: HOME HEALTH SERVICES | Facility: HOME HEALTH | Age: 60
End: 2025-06-08
Payer: COMMERCIAL

## 2025-06-08 NOTE — HH CARE COORDINATION
Home Care received a Referral for Physical Therapy and Occupational Therapy. We have processed the referral for a Start of Care on 6.9 or 6.10.25.     If you have any questions or concerns, please feel free to contact us at [phone]. Follow the prompts, enter your five digit zip code, and you will be directed to your care team on [facility].

## 2025-06-09 ENCOUNTER — HOME CARE VISIT (OUTPATIENT)
Dept: HOME HEALTH SERVICES | Facility: HOME HEALTH | Age: 60
End: 2025-06-09
Payer: COMMERCIAL

## 2025-06-09 VITALS
BODY MASS INDEX: 39.85 KG/M2 | SYSTOLIC BLOOD PRESSURE: 125 MMHG | HEIGHT: 60 IN | DIASTOLIC BLOOD PRESSURE: 78 MMHG | WEIGHT: 203 LBS | TEMPERATURE: 97.8 F | OXYGEN SATURATION: 99 % | RESPIRATION RATE: 18 BRPM | HEART RATE: 77 BPM

## 2025-06-09 PROCEDURE — G0151 HHCP-SERV OF PT,EA 15 MIN: HCPCS

## 2025-06-09 PROCEDURE — 0023 HH SOC

## 2025-06-09 ASSESSMENT — ENCOUNTER SYMPTOMS
PERSON REPORTING PAIN: PATIENT
HYPERTENSION: 1
PAIN SEVERITY GOAL: 0/10
LOWEST PAIN SEVERITY IN PAST 24 HOURS: 5/10
PAIN: 1
PAIN LOCATION: LEFT KNEE
HIGHEST PAIN SEVERITY IN PAST 24 HOURS: 5/10
OCCASIONAL FEELINGS OF UNSTEADINESS: 1
SUBJECTIVE PAIN PROGRESSION: UNCHANGED
LOWER EXTREMITY EDEMA: 1

## 2025-06-09 ASSESSMENT — ACTIVITIES OF DAILY LIVING (ADL)
AMBULATION ASSISTANCE ON FLAT SURFACES: 1
ENTERING_EXITING_HOME: CONTACT GUARD ASSIST
OASIS_M1830: 03

## 2025-06-12 ENCOUNTER — HOME CARE VISIT (OUTPATIENT)
Dept: HOME HEALTH SERVICES | Facility: HOME HEALTH | Age: 60
End: 2025-06-12
Payer: COMMERCIAL

## 2025-06-12 VITALS
SYSTOLIC BLOOD PRESSURE: 108 MMHG | TEMPERATURE: 98.4 F | HEART RATE: 80 BPM | DIASTOLIC BLOOD PRESSURE: 80 MMHG | OXYGEN SATURATION: 97 %

## 2025-06-12 PROCEDURE — G0152 HHCP-SERV OF OT,EA 15 MIN: HCPCS

## 2025-06-12 PROCEDURE — G0157 HHC PT ASSISTANT EA 15: HCPCS | Mod: CQ

## 2025-06-12 ASSESSMENT — ACTIVITIES OF DAILY LIVING (ADL)
PREPARING MEALS: INDEPENDENT
CURRENT_FUNCTION: INDEPENDENT
PHYSICAL TRANSFERS ASSESSED: 1
TOILETING: INDEPENDENT
TOILETING: 1
GROOMING_CURRENT_FUNCTION: INDEPENDENT
DRESSING_LB_CURRENT_FUNCTION: INDEPENDENT
BATHING ASSESSED: 1
GROOMING ASSESSED: 1
DRESSING_UB_CURRENT_FUNCTION: INDEPENDENT
BATHING_CURRENT_FUNCTION: SUPERVISION

## 2025-06-12 ASSESSMENT — ENCOUNTER SYMPTOMS
PAIN LOCATION - RELIEVING FACTORS: REST, PAIN MEDS
PAIN LOCATION - PAIN SEVERITY: 5/10
PAIN: 1
HIGHEST PAIN SEVERITY IN PAST 24 HOURS: 8/10
PAIN LOCATION - PAIN FREQUENCY: CONSTANT
LOWEST PAIN SEVERITY IN PAST 24 HOURS: 5/10
LOWEST PAIN SEVERITY IN PAST 24 HOURS: 0/10
PAIN LOCATION - PAIN SEVERITY: 5/10
SUBJECTIVE PAIN PROGRESSION: GRADUALLY IMPROVING
PAIN LOCATION: LEFT KNEE
PAIN LOCATION: LEFT KNEE
PERSON REPORTING PAIN: PATIENT
PERSON REPORTING PAIN: PATIENT
PAIN SEVERITY GOAL: 0/10
PAIN LOCATION - PAIN QUALITY: ACHY
PAIN SEVERITY GOAL: 0/10
HIGHEST PAIN SEVERITY IN PAST 24 HOURS: 5/10
PAIN: 1
PAIN LOCATION - EXACERBATING FACTORS: ACTIVITY
SUBJECTIVE PAIN PROGRESSION: GRADUALLY IMPROVING

## 2025-06-12 ASSESSMENT — PAIN SCALES - PAIN ASSESSMENT IN ADVANCED DEMENTIA (PAINAD)
NEGVOCALIZATION: 0 - NONE.
TOTALSCORE: 0
NEGVOCALIZATION: 0
BREATHING: 0
FACIALEXPRESSION: 0
FACIALEXPRESSION: 0 - SMILING OR INEXPRESSIVE.
CONSOLABILITY: 0
CONSOLABILITY: 0 - NO NEED TO CONSOLE.
BODYLANGUAGE: 0
BODYLANGUAGE: 0 - RELAXED.

## 2025-06-16 ENCOUNTER — HOME CARE VISIT (OUTPATIENT)
Dept: HOME HEALTH SERVICES | Facility: HOME HEALTH | Age: 60
End: 2025-06-16
Payer: COMMERCIAL

## 2025-06-16 VITALS
OXYGEN SATURATION: 99 % | RESPIRATION RATE: 18 BRPM | TEMPERATURE: 98.2 F | SYSTOLIC BLOOD PRESSURE: 118 MMHG | DIASTOLIC BLOOD PRESSURE: 60 MMHG | HEART RATE: 78 BPM

## 2025-06-16 PROCEDURE — G0157 HHC PT ASSISTANT EA 15: HCPCS | Mod: CQ

## 2025-06-16 ASSESSMENT — ENCOUNTER SYMPTOMS
PAIN LOCATION: LEFT KNEE
PERSON REPORTING PAIN: PATIENT
PAIN SEVERITY GOAL: 0/10
PAIN LOCATION - PAIN SEVERITY: 5/10
PAIN: 1
SUBJECTIVE PAIN PROGRESSION: GRADUALLY IMPROVING
LOWEST PAIN SEVERITY IN PAST 24 HOURS: 4/10
HIGHEST PAIN SEVERITY IN PAST 24 HOURS: 8/10

## 2025-06-16 ASSESSMENT — PAIN SCALES - PAIN ASSESSMENT IN ADVANCED DEMENTIA (PAINAD)
BREATHING: 0
BODYLANGUAGE: 0 - RELAXED.
BODYLANGUAGE: 0
NEGVOCALIZATION: 0
TOTALSCORE: 0
FACIALEXPRESSION: 0 - SMILING OR INEXPRESSIVE.
CONSOLABILITY: 0
NEGVOCALIZATION: 0 - NONE.
FACIALEXPRESSION: 0
CONSOLABILITY: 0 - NO NEED TO CONSOLE.

## 2025-06-20 ENCOUNTER — HOME CARE VISIT (OUTPATIENT)
Dept: HOME HEALTH SERVICES | Facility: HOME HEALTH | Age: 60
End: 2025-06-20
Payer: COMMERCIAL

## 2025-06-20 PROCEDURE — G0157 HHC PT ASSISTANT EA 15: HCPCS | Mod: CQ

## 2025-06-25 ENCOUNTER — HOME CARE VISIT (OUTPATIENT)
Dept: HOME HEALTH SERVICES | Facility: HOME HEALTH | Age: 60
End: 2025-06-25
Payer: COMMERCIAL

## 2025-06-25 PROCEDURE — G0157 HHC PT ASSISTANT EA 15: HCPCS | Mod: CQ

## 2025-06-25 ASSESSMENT — ENCOUNTER SYMPTOMS
PERSON REPORTING PAIN: PATIENT
PERSON REPORTING PAIN: PATIENT
DENIES PAIN: 1
SUBJECTIVE PAIN PROGRESSION: GRADUALLY IMPROVING
DENIES PAIN: 1

## 2025-06-25 ASSESSMENT — PAIN SCALES - PAIN ASSESSMENT IN ADVANCED DEMENTIA (PAINAD)
BODYLANGUAGE: 0
BODYLANGUAGE: 0 - RELAXED.
BODYLANGUAGE: 0
NEGVOCALIZATION: 0
BODYLANGUAGE: 0 - RELAXED.
CONSOLABILITY: 0
TOTALSCORE: 0
FACIALEXPRESSION: 0 - SMILING OR INEXPRESSIVE.
CONSOLABILITY: 0 - NO NEED TO CONSOLE.
FACIALEXPRESSION: 0
FACIALEXPRESSION: 0 - SMILING OR INEXPRESSIVE.
FACIALEXPRESSION: 0
BREATHING: 0
NEGVOCALIZATION: 0 - NONE.
CONSOLABILITY: 0 - NO NEED TO CONSOLE.
BREATHING: 0
TOTALSCORE: 0
NEGVOCALIZATION: 0 - NONE.
NEGVOCALIZATION: 0
CONSOLABILITY: 0

## 2025-06-26 ENCOUNTER — TELEPHONE (OUTPATIENT)
Dept: ORTHOPEDIC SURGERY | Facility: CLINIC | Age: 60
End: 2025-06-26
Payer: COMMERCIAL

## 2025-06-26 DIAGNOSIS — M25.561 ACUTE PAIN OF RIGHT KNEE: ICD-10-CM

## 2025-06-26 RX ORDER — OXYCODONE AND ACETAMINOPHEN 5; 325 MG/1; MG/1
1 TABLET ORAL EVERY 6 HOURS PRN
Qty: 5 TABLET | Refills: 0 | Status: SHIPPED | OUTPATIENT
Start: 2025-06-26

## 2025-06-26 NOTE — TELEPHONE ENCOUNTER
5/19/25 LT TKA     Patient called and stated she would like to get rx oxycodone 5-325mg filled.    Pain[facility]9  [facility]

## 2025-06-28 ENCOUNTER — HOME CARE VISIT (OUTPATIENT)
Dept: HOME HEALTH SERVICES | Facility: HOME HEALTH | Age: 60
End: 2025-06-28
Payer: COMMERCIAL

## 2025-06-30 ENCOUNTER — HOME CARE VISIT (OUTPATIENT)
Dept: HOME HEALTH SERVICES | Facility: HOME HEALTH | Age: 60
End: 2025-06-30
Payer: COMMERCIAL

## 2025-06-30 VITALS
DIASTOLIC BLOOD PRESSURE: 64 MMHG | OXYGEN SATURATION: 98 % | SYSTOLIC BLOOD PRESSURE: 110 MMHG | HEART RATE: 78 BPM | RESPIRATION RATE: 18 BRPM

## 2025-06-30 PROCEDURE — G0157 HHC PT ASSISTANT EA 15: HCPCS | Mod: CQ

## 2025-06-30 ASSESSMENT — ENCOUNTER SYMPTOMS
PERSON REPORTING PAIN: PATIENT
SUBJECTIVE PAIN PROGRESSION: GRADUALLY IMPROVING
DENIES PAIN: 1
LOWEST PAIN SEVERITY IN PAST 24 HOURS: 0/10
PAIN LOCATION: LEFT KNEE
PAIN LOCATION - PAIN SEVERITY: 0/10
PAIN SEVERITY GOAL: 0/10
HIGHEST PAIN SEVERITY IN PAST 24 HOURS: 10/10

## 2025-06-30 ASSESSMENT — PAIN SCALES - PAIN ASSESSMENT IN ADVANCED DEMENTIA (PAINAD)
TOTALSCORE: 0
CONSOLABILITY: 0 - NO NEED TO CONSOLE.
BODYLANGUAGE: 0
NEGVOCALIZATION: 0 - NONE.
FACIALEXPRESSION: 0 - SMILING OR INEXPRESSIVE.
CONSOLABILITY: 0
FACIALEXPRESSION: 0
BODYLANGUAGE: 0 - RELAXED.
BREATHING: 0
NEGVOCALIZATION: 0

## 2025-07-03 ENCOUNTER — HOME CARE VISIT (OUTPATIENT)
Dept: HOME HEALTH SERVICES | Facility: HOME HEALTH | Age: 60
End: 2025-07-03
Payer: COMMERCIAL

## 2025-07-03 PROCEDURE — G0151 HHCP-SERV OF PT,EA 15 MIN: HCPCS

## 2025-07-03 ASSESSMENT — ENCOUNTER SYMPTOMS
PERSON REPORTING PAIN: PATIENT
HIGHEST PAIN SEVERITY IN PAST 24 HOURS: 5/10
PAIN: 1
LOWEST PAIN SEVERITY IN PAST 24 HOURS: 0/10
PAIN SEVERITY GOAL: 0/10
SUBJECTIVE PAIN PROGRESSION: UNCHANGED

## 2025-07-03 ASSESSMENT — ACTIVITIES OF DAILY LIVING (ADL)
OASIS_M1830: 00
AMBULATION ASSISTANCE ON FLAT SURFACES: 1
HOME_HEALTH_OASIS: 00

## 2025-07-03 NOTE — CASE COMMUNICATION
Patient discharged from PT services and home health services at this time. Pt instructed on s/s to report to physician, instructed to follow up with dr as directed and continue with HEP as directed. Pt is modified independent with ADLs, gait, and HEP at the time of discharge. pt started op services at [facility] or [facility] until after weds follow up  at this time and will continue independently with HEP until the follow up  with physician    pt reports no falls no med changes since previous visit. pt incision shows no s/s of infection noted at the time of DC. pt rom 0-95 degrees rle knee.

## 2025-07-09 ENCOUNTER — HOSPITAL ENCOUNTER (OUTPATIENT)
Dept: RADIOLOGY | Facility: CLINIC | Age: 60
Discharge: HOME | End: 2025-07-09
Payer: COMMERCIAL

## 2025-07-09 ENCOUNTER — APPOINTMENT (OUTPATIENT)
Dept: ORTHOPEDIC SURGERY | Facility: CLINIC | Age: 60
End: 2025-07-09
Payer: COMMERCIAL

## 2025-07-09 DIAGNOSIS — Z96.652 S/P TOTAL KNEE ARTHROPLASTY, LEFT: Primary | ICD-10-CM

## 2025-07-09 DIAGNOSIS — Z96.652 S/P TOTAL KNEE ARTHROPLASTY, LEFT: ICD-10-CM

## 2025-07-09 PROCEDURE — 73564 X-RAY EXAM KNEE 4 OR MORE: CPT | Mod: LT

## 2025-07-09 PROCEDURE — 99024 POSTOP FOLLOW-UP VISIT: CPT | Performed by: ORTHOPAEDIC SURGERY

## 2025-07-09 PROCEDURE — 73562 X-RAY EXAM OF KNEE 3: CPT | Mod: RT

## 2025-07-09 PROCEDURE — 73564 X-RAY EXAM KNEE 4 OR MORE: CPT | Mod: LEFT SIDE | Performed by: RADIOLOGY

## 2025-07-09 ASSESSMENT — PAIN - FUNCTIONAL ASSESSMENT: PAIN_FUNCTIONAL_ASSESSMENT: NO/DENIES PAIN

## 2025-07-09 NOTE — PROGRESS NOTES
Chief Complaint   Patient presents with    Left Knee - Follow-up     05/19/25 LT TKA            This is a 59 y.o. female who is 6 weeks out from left total knee.  No drainage from her incision no fevers or chills.  Progressing well with physical therapy and appropriately improving in function.  No other new issues or symptoms.    Left knee examination: Surgical incision well healed no erythema no drainage.  Stable to varus valgus stress range of motion 0-1 10.  Neurovascular tact distally    X-rays of the knee were reviewed independently interpreted by me today, the show stable total knee arthroplasty no fracture dislocation or loosening    Impression plan: 59 y.o. female 6 weeks out from left total knee.  We discussed continuing physical therapy and home exercise program. Pain medications to be used as needed. Assistive devices as needed per PT. We discussed DVT prophylaxis until 6 weeks. No dentist until 3 months and thereafter dental prophylaxis for life. Activity as tolerated weightbearing as tolerated. I have personally reviewed the OARRS report for the patient. This report is scanned into the electronic medical record. I have considered the risks of abuse, dependence, addiction and diversion. Follow up 3 months with xrays.

## 2025-08-18 ENCOUNTER — APPOINTMENT (OUTPATIENT)
Dept: UROLOGY | Facility: CLINIC | Age: 60
End: 2025-08-18
Payer: COMMERCIAL

## 2025-08-20 ENCOUNTER — APPOINTMENT (OUTPATIENT)
Dept: UROLOGY | Facility: CLINIC | Age: 60
End: 2025-08-20
Payer: COMMERCIAL

## 2025-08-21 ENCOUNTER — APPOINTMENT (OUTPATIENT)
Dept: UROLOGY | Facility: CLINIC | Age: 60
End: 2025-08-21
Payer: COMMERCIAL

## 2025-08-25 ENCOUNTER — APPOINTMENT (OUTPATIENT)
Dept: UROLOGY | Facility: CLINIC | Age: 60
End: 2025-08-25
Payer: COMMERCIAL

## 2025-10-08 ENCOUNTER — APPOINTMENT (OUTPATIENT)
Dept: ORTHOPEDIC SURGERY | Facility: CLINIC | Age: 60
End: 2025-10-08
Payer: COMMERCIAL

## 2025-10-20 ENCOUNTER — APPOINTMENT (OUTPATIENT)
Dept: UROLOGY | Facility: CLINIC | Age: 60
End: 2025-10-20
Payer: COMMERCIAL

## 2025-10-23 ENCOUNTER — APPOINTMENT (OUTPATIENT)
Dept: UROLOGY | Facility: CLINIC | Age: 60
End: 2025-10-23
Payer: COMMERCIAL

## (undated) DEVICE — MASK,FACE,MAXFLUIDPROTECT,SHIELD/ERLPS: Brand: MEDLINE

## (undated) DEVICE — KENDALL 450 SERIES MONITORING FOAM ELECTRODE - RECTANGULAR SHAPE ( 3/PK): Brand: KENDALL

## (undated) DEVICE — VALVE SUCTION AIR H2O HYDR H2O JET CONN STRL ORCA POD + DISP

## (undated) DEVICE — LUBRICANT SURG JELLY ST BACTER TUBE 4.25OZ

## (undated) DEVICE — HOOD, SURGICAL, FLYTE, T7 PLUS, PEEL AWAY SHIELD

## (undated) DEVICE — MEDI-VAC NON-CONDUCTIVE SUCTION TUBING: Brand: CARDINAL HEALTH

## (undated) DEVICE — BLADE, RECIPROCATOR 12.5 X 76 X 0.9MM

## (undated) DEVICE — DRAPE PACK, TOTAL KNEE, CUSTOM, GEAUGA

## (undated) DEVICE — SUTURE, CTD, VICRYL, 2-0, UND, BR, CT-2

## (undated) DEVICE — NEEDLE, SPINAL, QUINCKE, 18 G X 3.5 IN, PINK HUB

## (undated) DEVICE — SYRINGE, 30 CC, LUER LOCK

## (undated) DEVICE — DRESSING, MEPILEX BORDER, POST-OP AG, 4 X 12 IN

## (undated) DEVICE — KIT BEDSIDE REVITAL OX 500ML

## (undated) DEVICE — 6 X 9  1.75MIL 4-WALL LABGUARD: Brand: MINIGRIP COMMERCIAL LLC

## (undated) DEVICE — SUTURE, STRATAFIX, 4-0, MONOCRYL PLUS, 27IN PS-2 70CM, UNDYED

## (undated) DEVICE — KIT, MINOR, DOUBLE BASIN

## (undated) DEVICE — DRAPE, SHEET, 17 X 23 IN

## (undated) DEVICE — SPONGE GZ 4IN 4IN 4 PLY N WVN AVANT

## (undated) DEVICE — TIP, SUCTION, YANKAUER, FLEXIBLE

## (undated) DEVICE — COVER, TABLE, 44X90

## (undated) DEVICE — SUTURE, STRATAFIX, SPIRAL PDS PLUS, 1-0, 9IN, 24CM, CT-1, VIOLET

## (undated) DEVICE — SUTURE, VICRYL, 1, 24 IN, CTD, UNDYED

## (undated) DEVICE — GOWN ISOLATN REG YEL M WT MULTIPLY SIDETIE LEV 2

## (undated) DEVICE — IRRIGATION SYSTEM, WOUND, PULSAVAC PLUS

## (undated) DEVICE — APPLICATOR, CHLORAPREP, W/ORANGE TINT, 26ML

## (undated) DEVICE — CUFF, TOURNIQUET, 34 X 4, SNGL PORT/SNGL BLADDER, DISP, LF

## (undated) DEVICE — PAD, KNEE PATIENT, DEMAYO, DISP, STERILE

## (undated) DEVICE — DRESSING, GAUZE, SPONGE, KERLIX, SUPER, 6 X 6.75 IN, STERILE 10PK

## (undated) DEVICE — CATHETER TRAY, SURESTEP, 14FR, PRECONNECTED DRAIN BAG

## (undated) DEVICE — IRRIGATION SYSTEM, WOUND, SURGIPHOR, 450ML, STERILE

## (undated) DEVICE — CONTAINER SPEC COLL 960ML POLYPR TRIANG GRAD INTAKE/OUTPUT

## (undated) DEVICE — CLOSURE SYSTEM, DERMABOND, PRINEO, 22CM, STERILE

## (undated) DEVICE — BLADE, OSCILLATOR 19.5 X 86 X 1.27MM

## (undated) DEVICE — SOLUTION, IRRIGATION, USP, 0.9% SODIUM CHL, 3000ML, TITAN XL

## (undated) DEVICE — BANDAGE, ELASTIC,  6 IN X 11 YDS, STERILE, LF